# Patient Record
Sex: FEMALE | Race: WHITE | Employment: FULL TIME | URBAN - METROPOLITAN AREA
[De-identification: names, ages, dates, MRNs, and addresses within clinical notes are randomized per-mention and may not be internally consistent; named-entity substitution may affect disease eponyms.]

---

## 2017-01-04 ENCOUNTER — OFFICE VISIT (OUTPATIENT)
Dept: FAMILY MEDICINE CLINIC | Facility: CLINIC | Age: 42
End: 2017-01-04

## 2017-01-04 VITALS
SYSTOLIC BLOOD PRESSURE: 108 MMHG | DIASTOLIC BLOOD PRESSURE: 72 MMHG | TEMPERATURE: 99 F | OXYGEN SATURATION: 98 % | BODY MASS INDEX: 27.49 KG/M2 | WEIGHT: 161 LBS | HEIGHT: 64 IN | RESPIRATION RATE: 18 BRPM | HEART RATE: 96 BPM

## 2017-01-04 DIAGNOSIS — J40 BRONCHITIS: ICD-10-CM

## 2017-01-04 DIAGNOSIS — J06.9 UPPER RESPIRATORY TRACT INFECTION, UNSPECIFIED TYPE: Primary | ICD-10-CM

## 2017-01-04 PROCEDURE — 99213 OFFICE O/P EST LOW 20 MIN: CPT | Performed by: PHYSICIAN ASSISTANT

## 2017-01-04 RX ORDER — PREDNISONE 10 MG/1
TABLET ORAL
Qty: 24 TABLET | Refills: 0 | Status: SHIPPED | OUTPATIENT
Start: 2017-01-04 | End: 2017-03-02 | Stop reason: ALTCHOICE

## 2017-01-04 RX ORDER — DOXYCYCLINE HYCLATE 100 MG
100 TABLET ORAL 2 TIMES DAILY
Qty: 20 TABLET | Refills: 0 | Status: SHIPPED | OUTPATIENT
Start: 2017-01-04 | End: 2017-01-14

## 2017-01-05 NOTE — PROGRESS NOTES
CHIEF COMPLAINT:   Patient presents with:  Sinus Problem: x 4 days      HPI:   Alie Pepper is a 42year old female who presents for URI sxs for  4 days. Pt reports she was seen at the beginning of December.   Was given bactrim for sinus infection, which see Albuterol Sulfate HFA (VENTOLIN) 108 (90 BASE) MCG/ACT Inhalation Aero Soln Inhale 2 puffs into the lungs every 4 (four) hours as needed for Wheezing.  Disp: 6 Inhaler Rfl: 1   Fluticasone Furoate-Vilanterol (BREO ELLIPTA) 200-25 MCG/INH Inhalation Aerosol EARS: TM's not erythematous, no bulging, no retraction, minimal fluid, bony landmarks intact. EACs WNL BL. NOSE: Nostrils patent, clear nasal discharge, nasal mucosa inflamed. Nasal septum perforation.    THROAT: oral mucosa pink, moist. Posterior phar You have a viral upper respiratory illness (URI), which is another term for the common cold. When the infection causes a lot of irritation, the air passages can go into spasm. This causes wheezing and shortness of breath.     This illness is contagious duri Follow up with your healthcare provider, or as advised.   When to seek medical advice  Call your healthcare provider right away if any of these occur:  · Cough with lots of colored sputum (mucus)  · Severe headache; face, neck, or ear pain  · Difficulty swa

## 2017-01-05 NOTE — PATIENT INSTRUCTIONS
-Cool mist humidifier at night  -Warm tea with honey  -Sudafed  -Rescue inhaler as needed. Viral Upper Respiratory Illness with Wheezing (Adult)  You have a viral upper respiratory illness (URI), which is another term for the common cold.  When the inf · Over-the-counter cold medicines will not shorten the length of time you’re sick, but they may be helpful for the following symptoms: cough, sore throat, and nasal and sinus congestion.  (Note: Do not use decongestants if you have high blood pressure.)  Fo

## 2017-02-08 ENCOUNTER — TELEPHONE (OUTPATIENT)
Dept: FAMILY MEDICINE CLINIC | Facility: CLINIC | Age: 42
End: 2017-02-08

## 2017-02-08 NOTE — TELEPHONE ENCOUNTER
Called to pt to discuss [facility] paperwork. Per Dr Mason Galdamez pt will need appt. To go over and complete paperwork. Call reached unidentified vm. Left vmm for pt to call back and schedule. Paperwork at [employer].

## 2017-02-09 ENCOUNTER — TELEPHONE (OUTPATIENT)
Dept: FAMILY MEDICINE CLINIC | Facility: CLINIC | Age: 42
End: 2017-02-09

## 2017-02-09 DIAGNOSIS — J45.30 MILD PERSISTENT ASTHMA WITHOUT COMPLICATION: Primary | ICD-10-CM

## 2017-02-09 DIAGNOSIS — J32.0 CHRONIC MAXILLARY SINUSITIS: Primary | ICD-10-CM

## 2017-02-09 NOTE — TELEPHONE ENCOUNTER
Message received from [address] [place] [address] today: Please see message below and advise if okay to refer for 6-month follow-up visits. This is an external referral that has been authorized to this provider in the past (2/4/16 thru 1/31/17).  Provider

## 2017-02-09 NOTE — TELEPHONE ENCOUNTER
Message received from [facility] today: Please see message below and advise if okay to refer.  This is an internal referral.    Dx Code: J45.30 - Mild persistent asthma without complication     Reason for the order/referral:Yearly Follow U

## 2017-03-02 ENCOUNTER — OFFICE VISIT (OUTPATIENT)
Dept: FAMILY MEDICINE CLINIC | Facility: CLINIC | Age: 42
End: 2017-03-02

## 2017-03-02 VITALS
BODY MASS INDEX: 28.17 KG/M2 | SYSTOLIC BLOOD PRESSURE: 102 MMHG | RESPIRATION RATE: 16 BRPM | HEIGHT: 64 IN | DIASTOLIC BLOOD PRESSURE: 78 MMHG | WEIGHT: 165 LBS | TEMPERATURE: 98 F | HEART RATE: 80 BPM

## 2017-03-02 DIAGNOSIS — J01.11 ACUTE RECURRENT FRONTAL SINUSITIS: ICD-10-CM

## 2017-03-02 DIAGNOSIS — J45.40 MODERATE PERSISTENT EXTRINSIC ASTHMA WITHOUT COMPLICATION: Primary | ICD-10-CM

## 2017-03-02 PROCEDURE — 99214 OFFICE O/P EST MOD 30 MIN: CPT | Performed by: FAMILY MEDICINE

## 2017-03-02 RX ORDER — DOXYCYCLINE HYCLATE 100 MG
100 TABLET ORAL 2 TIMES DAILY
Qty: 14 TABLET | Refills: 0 | Status: SHIPPED | OUTPATIENT
Start: 2017-03-02 | End: 2017-03-09

## 2017-03-02 NOTE — PROGRESS NOTES
HPI:   Soni Whitaker is a 42year old female who presents for upper respiratory symptoms for 2 weeks.  Patient reports sore throat, congestion, yellow colored nasal discharge, sinus pain, OTC cold meds have not been helping, prior history of sinusitis, denies needed for Wheezing. Disp: 6 Inhaler Rfl: 1   Albuterol Sulfate (VENTOLIN) (2.5 MG/3ML) 0.083% Inhalation Nebu Soln Take  by nebulization every 6 (six) hours as needed for Wheezing. Disp:  Rfl:    Levonorgestrel (MIRENA IU) by Intrauterine route.  Disp:  Rf 100mg BID x 7 days. Saline Rinse. Tylenol or motrin as needed. Antihistamine prn. Fluids. Probiotics advised. Take abx with food. Side effects discussed. The patient indicates understanding of these issues and agrees to the plan.   The patient is as

## 2017-03-06 ENCOUNTER — TELEPHONE (OUTPATIENT)
Dept: FAMILY MEDICINE CLINIC | Facility: CLINIC | Age: 42
End: 2017-03-06

## 2017-03-06 RX ORDER — CLARITHROMYCIN 500 MG/1
500 TABLET, COATED ORAL 2 TIMES DAILY
Qty: 20 TABLET | Refills: 0 | Status: SHIPPED | OUTPATIENT
Start: 2017-03-06 | End: 2017-03-16

## 2017-03-06 NOTE — TELEPHONE ENCOUNTER
Pt called. She has been on Doxycycline 100 mg bid since Thursday. On Saturday morning she woke up with feeling nauseated, a headache and white spots on her gums. She stopped the medication Sunday morning thinking it was all a reaction from the doxycycline.

## 2017-03-06 NOTE — TELEPHONE ENCOUNTER
Lm for pt to CB. Please check your pharmacy if you are unavailable to resah us before we close. There is a new prescription waiting there for you.  ( see note below.)

## 2017-03-07 ENCOUNTER — TELEPHONE (OUTPATIENT)
Dept: INTERNAL MEDICINE CLINIC | Facility: CLINIC | Age: 42
End: 2017-03-07

## 2017-03-15 RX ORDER — PHENTERMINE HYDROCHLORIDE 37.5 MG/1
TABLET ORAL
Qty: 30 TABLET | Refills: 0 | Status: SHIPPED | OUTPATIENT
Start: 2017-03-15 | End: 2017-04-27 | Stop reason: DRUGHIGH

## 2017-03-15 RX ORDER — LEVOTHYROXINE SODIUM 137 UG/1
TABLET ORAL
Qty: 30 TABLET | Refills: 0 | Status: SHIPPED | OUTPATIENT
Start: 2017-03-15 | End: 2017-06-01

## 2017-04-21 RX ORDER — BUPROPION HYDROCHLORIDE 100 MG/1
TABLET ORAL
Qty: 120 TABLET | Refills: 0 | OUTPATIENT
Start: 2017-04-21

## 2017-04-21 RX ORDER — LEVOTHYROXINE SODIUM 137 UG/1
TABLET ORAL
Qty: 30 TABLET | Refills: 0 | Status: SHIPPED | OUTPATIENT
Start: 2017-04-21 | End: 2017-08-10 | Stop reason: DRUGHIGH

## 2017-04-21 RX ORDER — PHENTERMINE HYDROCHLORIDE 37.5 MG/1
TABLET ORAL
Qty: 30 TABLET | Refills: 0 | OUTPATIENT
Start: 2017-04-21

## 2017-04-21 NOTE — TELEPHONE ENCOUNTER
Requesting Phentermine and Bupropion  LOV: 12/13/16  RTC: 3 months  Last Labs: n/a  Filled: 12/13/16 #30 with 2 refills  Phentermine  Filled: 12/13/16 #120 with 5 refills Bupropion    No future appointments.     Bupropion was refilled until June and receipt

## 2017-04-27 ENCOUNTER — OFFICE VISIT (OUTPATIENT)
Dept: FAMILY MEDICINE CLINIC | Facility: CLINIC | Age: 42
End: 2017-04-27

## 2017-04-27 VITALS
SYSTOLIC BLOOD PRESSURE: 110 MMHG | RESPIRATION RATE: 16 BRPM | DIASTOLIC BLOOD PRESSURE: 78 MMHG | WEIGHT: 158 LBS | TEMPERATURE: 98 F | BODY MASS INDEX: 27 KG/M2 | OXYGEN SATURATION: 98 % | HEART RATE: 88 BPM

## 2017-04-27 DIAGNOSIS — J02.9 SORE THROAT: ICD-10-CM

## 2017-04-27 DIAGNOSIS — J01.00 ACUTE MAXILLARY SINUSITIS, RECURRENCE NOT SPECIFIED: Primary | ICD-10-CM

## 2017-04-27 PROCEDURE — 87880 STREP A ASSAY W/OPTIC: CPT | Performed by: PHYSICIAN ASSISTANT

## 2017-04-27 PROCEDURE — 99213 OFFICE O/P EST LOW 20 MIN: CPT | Performed by: PHYSICIAN ASSISTANT

## 2017-04-27 RX ORDER — SULFAMETHOXAZOLE AND TRIMETHOPRIM 800; 160 MG/1; MG/1
1 TABLET ORAL 2 TIMES DAILY
Qty: 20 TABLET | Refills: 0 | Status: SHIPPED | OUTPATIENT
Start: 2017-04-27 | End: 2017-05-07

## 2017-04-27 NOTE — PROGRESS NOTES
CHIEF COMPLAINT:   Patient presents with:  Nasal Congestion      HPI:   Vasile Matthews is a 42year old female who presents for URI sxs for  1.5 weeks.   Patient reports chills, nasal congestion with yellow drainage, sore throat, left ear pain, HAs, wheezing ye Albuterol Sulfate HFA (VENTOLIN) 108 (90 BASE) MCG/ACT Inhalation Aero Soln Inhale 2 puffs into the lungs every 4 (four) hours as needed for Wheezing.  Disp: 6 Inhaler Rfl: 1   Albuterol Sulfate (VENTOLIN) (2.5 MG/3ML) 0.083% Inhalation Nebu Soln Take  by n LUNGS: clear to auscultation bilaterally, no wheezes or rhonchi. Breathing is non labored. CARDIO: RRR without murmur  GI: active BS's x4,no masses, hepatosplenomegaly, or tenderness on direct palpation.     EXTREMITIES: no cyanosis, clubbing or edema  LYM ABRS most often follows an upper respiratory infection caused by a virus. Bacteria then infect the lining of your nasal cavity and sinuses.  But you can also get ABRS if you have:  · Nasal allergies  · Long-term nasal swelling and congestion not caused by a These problems may need to be treated in a hospital with intravenous (IV) antibiotic medicine or surgery.   When to call the health care provider  Call your health care provider if you have any of the following:  · Symptoms that don’t get better, or get wor

## 2017-04-27 NOTE — PATIENT INSTRUCTIONS
-Cool mist humidifier at night  -Warm tea with honey  -Motrin for pain or fever  -Follow up with PCP with any worsening symptoms            Acute Bacterial Rhinosinusitis (ABRS)  Acute bacterial rhinosinusitis (ABRS) is an infection of your nasal cavity an · Nasal corticosteroid medicine. Drops or spray used in the nose can lessen swelling and congestion. · Over-the-counter pain medicine. This is to lessen sinus pain and pressure. · Nasal decongestant medicine. Spray or drops may help to lessen congestion.

## 2017-06-01 ENCOUNTER — OFFICE VISIT (OUTPATIENT)
Dept: INTERNAL MEDICINE CLINIC | Facility: CLINIC | Age: 42
End: 2017-06-01

## 2017-06-01 VITALS
BODY MASS INDEX: 28 KG/M2 | WEIGHT: 164 LBS | SYSTOLIC BLOOD PRESSURE: 122 MMHG | HEIGHT: 64 IN | HEART RATE: 74 BPM | RESPIRATION RATE: 16 BRPM | DIASTOLIC BLOOD PRESSURE: 78 MMHG

## 2017-06-01 DIAGNOSIS — E53.8 LOW VITAMIN B12 LEVEL: ICD-10-CM

## 2017-06-01 DIAGNOSIS — Z51.81 ENCOUNTER FOR THERAPEUTIC DRUG MONITORING: Primary | ICD-10-CM

## 2017-06-01 DIAGNOSIS — E66.3 OVERWEIGHT (BMI 25.0-29.9): ICD-10-CM

## 2017-06-01 PROCEDURE — 96372 THER/PROPH/DIAG INJ SC/IM: CPT | Performed by: INTERNAL MEDICINE

## 2017-06-01 PROCEDURE — 99213 OFFICE O/P EST LOW 20 MIN: CPT | Performed by: INTERNAL MEDICINE

## 2017-06-01 RX ORDER — CYANOCOBALAMIN 1000 UG/ML
1000 INJECTION INTRAMUSCULAR; SUBCUTANEOUS ONCE
Status: COMPLETED | OUTPATIENT
Start: 2017-06-01 | End: 2017-06-01

## 2017-06-01 RX ORDER — BUPROPION HYDROCHLORIDE 100 MG/1
200 TABLET ORAL 2 TIMES DAILY
Qty: 120 TABLET | Refills: 5 | Status: SHIPPED | OUTPATIENT
Start: 2017-06-01 | End: 2017-06-30

## 2017-06-01 RX ORDER — PHENTERMINE HYDROCHLORIDE 37.5 MG/1
37.5 TABLET ORAL
Qty: 30 TABLET | Refills: 0 | Status: SHIPPED | OUTPATIENT
Start: 2017-06-01 | End: 2017-06-30

## 2017-06-01 RX ORDER — TOPIRAMATE 50 MG/1
TABLET, FILM COATED ORAL
Qty: 60 TABLET | Refills: 5 | Status: SHIPPED | OUTPATIENT
Start: 2017-06-01 | End: 2017-06-30

## 2017-06-01 RX ADMIN — CYANOCOBALAMIN 1000 MCG: 1000 INJECTION INTRAMUSCULAR; SUBCUTANEOUS at 14:16:00

## 2017-06-01 NOTE — PROGRESS NOTES
CC: Patient presents with:  Weight Check: up 3 lb       HPI:   Overweight, run out of meds, was on phentermine, topamax and wellbutrin, worsening hunger. No chest pain.        Current Outpatient Prescriptions:  topiramate 50 MG Oral Tab TAKE 1 TABLET( Nontoxic uninodular goiter     Allergic rhinitis, cause unspecified     Herpes simplex without mention of complication     Abnormal weight gain     Unspecified hypothyroidism     Unspecified asthma(493.90)     Sinusitis     Overweight     Acquired hypothyr issues and agrees to the plan. Return in about 4 weeks (around 6/29/2017).

## 2017-06-02 RX ORDER — LEVOTHYROXINE SODIUM 137 UG/1
TABLET ORAL
Qty: 30 TABLET | Refills: 0 | Status: SHIPPED | OUTPATIENT
Start: 2017-06-02 | End: 2017-06-22

## 2017-06-22 ENCOUNTER — OFFICE VISIT (OUTPATIENT)
Dept: FAMILY MEDICINE CLINIC | Facility: CLINIC | Age: 42
End: 2017-06-22

## 2017-06-22 VITALS
HEIGHT: 64 IN | TEMPERATURE: 99 F | RESPIRATION RATE: 16 BRPM | DIASTOLIC BLOOD PRESSURE: 64 MMHG | BODY MASS INDEX: 26.29 KG/M2 | SYSTOLIC BLOOD PRESSURE: 92 MMHG | WEIGHT: 154 LBS | HEART RATE: 94 BPM | OXYGEN SATURATION: 98 %

## 2017-06-22 DIAGNOSIS — S01.511A TEAR OF FRENULUM OF UPPER LIP, INITIAL ENCOUNTER: ICD-10-CM

## 2017-06-22 DIAGNOSIS — Z23 NEED FOR VACCINATION: ICD-10-CM

## 2017-06-22 DIAGNOSIS — K05.00 ACUTE GINGIVAL INFLAMMATION: Primary | ICD-10-CM

## 2017-06-22 DIAGNOSIS — K05.00 GINGIVITIS, ACUTE: ICD-10-CM

## 2017-06-22 PROCEDURE — 90471 IMMUNIZATION ADMIN: CPT | Performed by: FAMILY MEDICINE

## 2017-06-22 PROCEDURE — 99214 OFFICE O/P EST MOD 30 MIN: CPT | Performed by: FAMILY MEDICINE

## 2017-06-22 PROCEDURE — 90715 TDAP VACCINE 7 YRS/> IM: CPT | Performed by: FAMILY MEDICINE

## 2017-06-22 RX ORDER — PREDNISONE 20 MG/1
TABLET ORAL
Refills: 2 | COMMUNITY
Start: 2017-05-31 | End: 2017-06-30

## 2017-06-22 RX ORDER — DOXYCYCLINE 100 MG/1
100 CAPSULE ORAL EVERY 12 HOURS
Qty: 14 CAPSULE | Refills: 0 | Status: SHIPPED | OUTPATIENT
Start: 2017-06-22 | End: 2017-06-29

## 2017-06-22 RX ORDER — CYANOCOBALAMIN 1000 UG/ML
INJECTION INTRAMUSCULAR; SUBCUTANEOUS
COMMUNITY
End: 2018-04-10

## 2017-06-22 NOTE — PATIENT INSTRUCTIONS
Stages of Periodontal Disease  Periodontal disease is an infection of the gums and tissues supporting the teeth. If not treated, it often gets worse. Bone damage and tooth loss can occur.  Regular self-care and dental visits can help prevent or control pe

## 2017-06-22 NOTE — PROGRESS NOTES
[facility] Family Medicine Office Note  Chief Complaint:   Patient presents with:  Dental Problem (dental): infection gums upper left side, x 4 days, painful, swelling       HPI:   This is a 42year old female coming in for infection on upper tyler Injection Solution Inject into the muscle every 30 (thirty) days. Disp:  Rfl:    Doxycycline Monohydrate 100 MG Oral Cap Take 1 capsule (100 mg total) by mouth every 12 (twelve) hours.  Disp: 14 capsule Rfl: 0   topiramate 50 MG Oral Tab TAKE 1 TABLET(50 MG dyspnea on exertion or at rest.  RESPIRATORY:  Denies shortness of breath, wheezing, cough or sputum. GASTROINTESTINAL:  Denies abdominal pain, nausea, vomiting, constipation, diarrhea, or blood in stool.   MUSCULOSKELETAL:  Denies weakness, muscle aches, sounds normal in all 4 quadrants, no masses, no hepatosplenomegaly. BACK: No tenderness, no spasm. EXTREMITIES:  No edema, no cyanosis, no clubbing, FROM, 2+ dorsalis pedis pulses bilaterally.   NEURO:  No deficit, normal gait, strength and tone, sensory

## 2017-06-30 ENCOUNTER — OFFICE VISIT (OUTPATIENT)
Dept: INTERNAL MEDICINE CLINIC | Facility: CLINIC | Age: 42
End: 2017-06-30

## 2017-06-30 VITALS
HEART RATE: 74 BPM | BODY MASS INDEX: 27.49 KG/M2 | DIASTOLIC BLOOD PRESSURE: 78 MMHG | HEIGHT: 64 IN | SYSTOLIC BLOOD PRESSURE: 122 MMHG | RESPIRATION RATE: 16 BRPM | WEIGHT: 161 LBS

## 2017-06-30 DIAGNOSIS — E53.8 LOW VITAMIN B12 LEVEL: ICD-10-CM

## 2017-06-30 DIAGNOSIS — Z51.81 ENCOUNTER FOR THERAPEUTIC DRUG MONITORING: Primary | ICD-10-CM

## 2017-06-30 DIAGNOSIS — E66.3 OVERWEIGHT (BMI 25.0-29.9): ICD-10-CM

## 2017-06-30 PROCEDURE — 96372 THER/PROPH/DIAG INJ SC/IM: CPT | Performed by: INTERNAL MEDICINE

## 2017-06-30 PROCEDURE — 99213 OFFICE O/P EST LOW 20 MIN: CPT | Performed by: INTERNAL MEDICINE

## 2017-06-30 RX ORDER — CYANOCOBALAMIN 1000 UG/ML
1000 INJECTION INTRAMUSCULAR; SUBCUTANEOUS ONCE
Status: COMPLETED | OUTPATIENT
Start: 2017-06-30 | End: 2017-06-30

## 2017-06-30 RX ORDER — BUPROPION HYDROCHLORIDE 100 MG/1
200 TABLET ORAL 2 TIMES DAILY
Qty: 120 TABLET | Refills: 5 | Status: SHIPPED | OUTPATIENT
Start: 2017-06-30 | End: 2018-04-10

## 2017-06-30 RX ORDER — TOPIRAMATE 50 MG/1
TABLET, FILM COATED ORAL
Qty: 60 TABLET | Refills: 5 | Status: SHIPPED | OUTPATIENT
Start: 2017-06-30 | End: 2018-04-10

## 2017-06-30 RX ORDER — PHENTERMINE HYDROCHLORIDE 37.5 MG/1
37.5 TABLET ORAL
Qty: 30 TABLET | Refills: 0 | Status: SHIPPED | OUTPATIENT
Start: 2017-06-30 | End: 2017-08-03

## 2017-06-30 RX ORDER — METFORMIN HYDROCHLORIDE 750 MG/1
750 TABLET, EXTENDED RELEASE ORAL DAILY
Qty: 30 TABLET | Refills: 0 | Status: SHIPPED | OUTPATIENT
Start: 2017-06-30 | End: 2017-08-03

## 2017-06-30 RX ADMIN — CYANOCOBALAMIN 1000 MCG: 1000 INJECTION INTRAMUSCULAR; SUBCUTANEOUS at 16:22:00

## 2017-06-30 NOTE — PROGRESS NOTES
CC: Patient presents with:  Weight Check: down 3 lb       HPI:   Overweight, doing well on phentermine, topamax and wellbutrin, worsening hunger, mostly at beginning of day.        Current Outpatient Prescriptions:  BuPROPion HCl (WELLBUTRIN) 100 MG O Nontoxic uninodular goiter     Allergic rhinitis, cause unspecified     Herpes simplex without mention of complication     Abnormal weight gain     Unspecified hypothyroidism     Unspecified asthma(493.90)     Sinusitis     Overweight     Acquired hypoth cont monthly B12 shots     The patient indicates understanding of these issues and agrees to the plan. Return in about 4 weeks (around 7/28/2017).

## 2017-07-10 RX ORDER — LEVOTHYROXINE SODIUM 137 UG/1
137 TABLET ORAL
Qty: 30 TABLET | Refills: 0 | Status: SHIPPED | OUTPATIENT
Start: 2017-07-10 | End: 2017-08-03

## 2017-07-10 NOTE — TELEPHONE ENCOUNTER
Thyroid Supplements Protocol Failed    LEVOTHYROXINE 0.137MG (137MCG) TAB    Her labs TSH was done on 10/26/16 with a level of 0.060     Rx is pending for your approval.    Please sign,    Thank you

## 2017-07-14 ENCOUNTER — TELEPHONE (OUTPATIENT)
Dept: INTERNAL MEDICINE CLINIC | Facility: CLINIC | Age: 42
End: 2017-07-14

## 2017-08-01 ENCOUNTER — PATIENT MESSAGE (OUTPATIENT)
Dept: FAMILY MEDICINE CLINIC | Facility: CLINIC | Age: 42
End: 2017-08-01

## 2017-08-02 NOTE — TELEPHONE ENCOUNTER
YOHAN  I was unable to reach patient by phone. Information given to [facility].    Please advise, thank you

## 2017-08-03 ENCOUNTER — OFFICE VISIT (OUTPATIENT)
Dept: INTERNAL MEDICINE CLINIC | Facility: CLINIC | Age: 42
End: 2017-08-03

## 2017-08-03 VITALS
WEIGHT: 160 LBS | RESPIRATION RATE: 16 BRPM | DIASTOLIC BLOOD PRESSURE: 78 MMHG | BODY MASS INDEX: 27.31 KG/M2 | HEART RATE: 78 BPM | SYSTOLIC BLOOD PRESSURE: 112 MMHG | HEIGHT: 64 IN

## 2017-08-03 DIAGNOSIS — Z51.81 ENCOUNTER FOR THERAPEUTIC DRUG MONITORING: Primary | ICD-10-CM

## 2017-08-03 DIAGNOSIS — E66.3 OVERWEIGHT (BMI 25.0-29.9): ICD-10-CM

## 2017-08-03 DIAGNOSIS — E53.8 LOW VITAMIN B12 LEVEL: ICD-10-CM

## 2017-08-03 PROCEDURE — 96372 THER/PROPH/DIAG INJ SC/IM: CPT | Performed by: INTERNAL MEDICINE

## 2017-08-03 PROCEDURE — 99213 OFFICE O/P EST LOW 20 MIN: CPT | Performed by: INTERNAL MEDICINE

## 2017-08-03 RX ORDER — PHENTERMINE HYDROCHLORIDE 37.5 MG/1
37.5 TABLET ORAL
Qty: 30 TABLET | Refills: 0 | Status: SHIPPED | OUTPATIENT
Start: 2017-08-03 | End: 2017-09-15 | Stop reason: ALTCHOICE

## 2017-08-03 RX ORDER — METFORMIN HYDROCHLORIDE 750 MG/1
1500 TABLET, EXTENDED RELEASE ORAL DAILY
Qty: 60 TABLET | Refills: 5 | Status: SHIPPED | OUTPATIENT
Start: 2017-08-03 | End: 2018-04-10

## 2017-08-03 RX ORDER — CYANOCOBALAMIN 1000 UG/ML
1000 INJECTION INTRAMUSCULAR; SUBCUTANEOUS ONCE
Status: COMPLETED | OUTPATIENT
Start: 2017-08-03 | End: 2017-08-03

## 2017-08-03 RX ADMIN — CYANOCOBALAMIN 1000 MCG: 1000 INJECTION INTRAMUSCULAR; SUBCUTANEOUS at 12:24:00

## 2017-08-03 NOTE — PROGRESS NOTES
CC: Patient presents with:  Weight Check: down 1 lb       HPI:   Overweight, doing well on phentermine, topamax, metformin and wellbutrin, worsening hunger still.         Current Outpatient Prescriptions:  Phentermine HCl 37.5 MG Oral Tab Take 1 tablet Nontoxic uninodular goiter     Allergic rhinitis, cause unspecified     Herpes simplex without mention of complication     Abnormal weight gain     Unspecified hypothyroidism     Unspecified asthma(493.90)     Sinusitis     Overweight     Acquired hypothyr

## 2017-08-09 ENCOUNTER — TELEPHONE (OUTPATIENT)
Dept: FAMILY MEDICINE CLINIC | Facility: CLINIC | Age: 42
End: 2017-08-09

## 2017-08-09 ENCOUNTER — APPOINTMENT (OUTPATIENT)
Dept: LAB | Age: 42
End: 2017-08-09
Attending: FAMILY MEDICINE
Payer: COMMERCIAL

## 2017-08-09 DIAGNOSIS — E03.9 ACQUIRED HYPOTHYROIDISM: ICD-10-CM

## 2017-08-09 DIAGNOSIS — E03.9 ACQUIRED HYPOTHYROIDISM: Primary | ICD-10-CM

## 2017-08-09 LAB
FREE T4: 1.1 NG/DL (ref 0.9–1.8)
TSI SER-ACNC: 0.33 MIU/ML (ref 0.35–5.5)

## 2017-08-09 PROCEDURE — 36415 COLL VENOUS BLD VENIPUNCTURE: CPT

## 2017-08-09 PROCEDURE — 84439 ASSAY OF FREE THYROXINE: CPT

## 2017-08-09 PROCEDURE — 84443 ASSAY THYROID STIM HORMONE: CPT

## 2017-08-09 NOTE — TELEPHONE ENCOUNTER
Pt transferred to nurse. States she is at the lab for labs for what she thought were supposed to be for thyroid and there are not any labs in. She states she called her before going and was told she has labs in place.  I don't see any notes regarding this

## 2017-08-10 ENCOUNTER — TELEPHONE (OUTPATIENT)
Dept: FAMILY MEDICINE CLINIC | Facility: CLINIC | Age: 42
End: 2017-08-10

## 2017-08-10 DIAGNOSIS — E03.9 HYPOTHYROIDISM, UNSPECIFIED TYPE: Primary | ICD-10-CM

## 2017-08-10 RX ORDER — LEVOTHYROXINE SODIUM 0.12 MG/1
125 TABLET ORAL
Qty: 30 TABLET | Refills: 1 | Status: SHIPPED | OUTPATIENT
Start: 2017-08-10 | End: 2017-12-12

## 2017-08-10 RX ORDER — MONTELUKAST SODIUM 10 MG/1
TABLET ORAL
Qty: 90 TABLET | Refills: 0 | Status: SHIPPED | OUTPATIENT
Start: 2017-08-10 | End: 2018-01-05

## 2017-09-05 ENCOUNTER — OFFICE VISIT (OUTPATIENT)
Dept: INTERNAL MEDICINE CLINIC | Facility: CLINIC | Age: 42
End: 2017-09-05

## 2017-09-05 VITALS
SYSTOLIC BLOOD PRESSURE: 118 MMHG | HEIGHT: 64 IN | RESPIRATION RATE: 16 BRPM | DIASTOLIC BLOOD PRESSURE: 76 MMHG | BODY MASS INDEX: 28.34 KG/M2 | HEART RATE: 82 BPM | WEIGHT: 166 LBS

## 2017-09-05 DIAGNOSIS — Z51.81 ENCOUNTER FOR THERAPEUTIC DRUG MONITORING: Primary | ICD-10-CM

## 2017-09-05 DIAGNOSIS — E66.3 OVERWEIGHT (BMI 25.0-29.9): ICD-10-CM

## 2017-09-05 DIAGNOSIS — E53.8 LOW VITAMIN B12 LEVEL: ICD-10-CM

## 2017-09-05 PROCEDURE — 96372 THER/PROPH/DIAG INJ SC/IM: CPT | Performed by: INTERNAL MEDICINE

## 2017-09-05 PROCEDURE — 99213 OFFICE O/P EST LOW 20 MIN: CPT | Performed by: INTERNAL MEDICINE

## 2017-09-05 RX ORDER — PHENTERMINE HYDROCHLORIDE 37.5 MG/1
37.5 TABLET ORAL
Qty: 30 TABLET | Refills: 0 | Status: CANCELLED | OUTPATIENT
Start: 2017-09-05

## 2017-09-05 RX ORDER — PHENDIMETRAZINE TARTRATE 105 MG/1
1 CAPSULE, EXTENDED RELEASE ORAL DAILY
Qty: 30 CAPSULE | Refills: 0 | Status: SHIPPED | OUTPATIENT
Start: 2017-09-05 | End: 2017-10-05

## 2017-09-05 RX ORDER — CYANOCOBALAMIN 1000 UG/ML
1000 INJECTION INTRAMUSCULAR; SUBCUTANEOUS ONCE
Status: COMPLETED | OUTPATIENT
Start: 2017-09-05 | End: 2017-09-05

## 2017-09-05 RX ADMIN — CYANOCOBALAMIN 1000 MCG: 1000 INJECTION INTRAMUSCULAR; SUBCUTANEOUS at 16:03:00

## 2017-09-05 NOTE — PROGRESS NOTES
CC: Patient presents with:  Weight Check: up 6 lbs       HPI:   Overweight, doing well on phentermine, topamax, metformin and wellbutrin, worsening hunger still. She is not sure if phentermine helping much.        Current Outpatient Prescriptions:  Phe • Chronic rhinitis    • Extrinsic asthma, unspecified    • Hypothyroid    • Hypothyroidism    • Nasal polyps       Patient Active Problem List:     Nontoxic multinodular goiter     Nontoxic uninodular goiter     Allergic rhinitis, cause unspecified     H

## 2017-09-07 RX ORDER — ERGOCALCIFEROL 1.25 MG/1
CAPSULE ORAL
Qty: 4 CAPSULE | Refills: 0 | OUTPATIENT
Start: 2017-09-07

## 2017-09-15 ENCOUNTER — OFFICE VISIT (OUTPATIENT)
Dept: FAMILY MEDICINE CLINIC | Facility: CLINIC | Age: 42
End: 2017-09-15

## 2017-09-15 VITALS
SYSTOLIC BLOOD PRESSURE: 110 MMHG | OXYGEN SATURATION: 98 % | BODY MASS INDEX: 28 KG/M2 | TEMPERATURE: 98 F | DIASTOLIC BLOOD PRESSURE: 76 MMHG | HEART RATE: 90 BPM | RESPIRATION RATE: 16 BRPM | WEIGHT: 163 LBS

## 2017-09-15 DIAGNOSIS — G96.01 CSF LEAK FROM NOSE: ICD-10-CM

## 2017-09-15 DIAGNOSIS — J01.11 ACUTE RECURRENT FRONTAL SINUSITIS: Primary | ICD-10-CM

## 2017-09-15 PROCEDURE — 99214 OFFICE O/P EST MOD 30 MIN: CPT | Performed by: FAMILY MEDICINE

## 2017-09-15 RX ORDER — DOXYCYCLINE HYCLATE 100 MG
100 TABLET ORAL 2 TIMES DAILY
Qty: 14 TABLET | Refills: 0 | Status: SHIPPED | OUTPATIENT
Start: 2017-09-15 | End: 2017-09-22

## 2017-09-18 ENCOUNTER — APPOINTMENT (OUTPATIENT)
Dept: LAB | Age: 42
End: 2017-09-18
Attending: FAMILY MEDICINE
Payer: COMMERCIAL

## 2017-09-18 DIAGNOSIS — G96.01 CSF LEAK FROM NOSE: ICD-10-CM

## 2017-09-18 PROCEDURE — 86335 IMMUNFIX E-PHORSIS/URINE/CSF: CPT

## 2017-09-20 LAB — BETA-2 TRANSFERRIN: NOT DETECTED

## 2017-10-27 ENCOUNTER — PATIENT MESSAGE (OUTPATIENT)
Dept: FAMILY MEDICINE CLINIC | Facility: CLINIC | Age: 42
End: 2017-10-27

## 2017-10-27 NOTE — TELEPHONE ENCOUNTER
Contact made with patient, information and recommendations given, understanding verbalized states she will go to immediate care today.

## 2017-10-27 NOTE — TELEPHONE ENCOUNTER
I do not typically give antibiotics site unseen. She either needs to come in tomorrow for an acute visit with the provider in the office or she can go to immediate care.

## 2017-10-27 NOTE — TELEPHONE ENCOUNTER
From: Tsering Agustin  To: Nelida Lewis DO  Sent: 10/27/2017 8:05 AM CDT  Subject: Prescription Question    Hi,    I have a question regarding a refill on an antibiotic.  I have frequent sinus infections and once again I have one that I have been holding

## 2017-10-30 ENCOUNTER — OFFICE VISIT (OUTPATIENT)
Dept: FAMILY MEDICINE CLINIC | Facility: CLINIC | Age: 42
End: 2017-10-30

## 2017-10-30 VITALS
WEIGHT: 169 LBS | TEMPERATURE: 98 F | SYSTOLIC BLOOD PRESSURE: 110 MMHG | HEART RATE: 96 BPM | OXYGEN SATURATION: 98 % | RESPIRATION RATE: 16 BRPM | DIASTOLIC BLOOD PRESSURE: 74 MMHG | HEIGHT: 64 IN | BODY MASS INDEX: 28.85 KG/M2

## 2017-10-30 DIAGNOSIS — J01.11 ACUTE RECURRENT FRONTAL SINUSITIS: Primary | ICD-10-CM

## 2017-10-30 DIAGNOSIS — J02.9 SORE THROAT: ICD-10-CM

## 2017-10-30 PROCEDURE — 99214 OFFICE O/P EST MOD 30 MIN: CPT | Performed by: FAMILY MEDICINE

## 2017-10-30 PROCEDURE — 87880 STREP A ASSAY W/OPTIC: CPT | Performed by: FAMILY MEDICINE

## 2017-10-30 RX ORDER — METHYLPREDNISOLONE 4 MG/1
TABLET ORAL
Qty: 1 KIT | Refills: 0 | Status: SHIPPED | OUTPATIENT
Start: 2017-10-30 | End: 2017-12-11 | Stop reason: ALTCHOICE

## 2017-10-30 RX ORDER — CEFDINIR 300 MG/1
300 CAPSULE ORAL 2 TIMES DAILY
Qty: 20 CAPSULE | Refills: 0 | Status: SHIPPED | OUTPATIENT
Start: 2017-10-30 | End: 2017-11-09

## 2017-10-30 NOTE — PROGRESS NOTES
HPI:   Chai Peguero is a 42year old female who presents for upper respiratory symptoms for  7  days. Patient reports sore throat, congestion, ear pain, sinus pain, OTC cold meds have not been helping, prior history of sinusitis, denies fever.       Current O NASAL POLYP DR Lakhwinder Garrison   Family History   Problem Relation Age of Onset   • Cancer Mother      Brain   • Cancer Maternal Grandmother      stomach cancer?    • Diabetes Maternal Grandmother    • Diabetes Maternal Grandfather    • Heart Disorder Maternal [place]

## 2017-12-11 ENCOUNTER — OFFICE VISIT (OUTPATIENT)
Dept: INTERNAL MEDICINE CLINIC | Facility: CLINIC | Age: 42
End: 2017-12-11

## 2017-12-11 ENCOUNTER — OFFICE VISIT (OUTPATIENT)
Dept: FAMILY MEDICINE CLINIC | Facility: CLINIC | Age: 42
End: 2017-12-11

## 2017-12-11 VITALS
DIASTOLIC BLOOD PRESSURE: 72 MMHG | RESPIRATION RATE: 16 BRPM | TEMPERATURE: 98 F | SYSTOLIC BLOOD PRESSURE: 104 MMHG | BODY MASS INDEX: 28.51 KG/M2 | HEIGHT: 64 IN | HEART RATE: 86 BPM | OXYGEN SATURATION: 98 % | WEIGHT: 167 LBS

## 2017-12-11 VITALS
BODY MASS INDEX: 30.05 KG/M2 | RESPIRATION RATE: 16 BRPM | HEART RATE: 80 BPM | HEIGHT: 64 IN | DIASTOLIC BLOOD PRESSURE: 76 MMHG | WEIGHT: 176 LBS | SYSTOLIC BLOOD PRESSURE: 122 MMHG

## 2017-12-11 DIAGNOSIS — E66.3 OVERWEIGHT (BMI 25.0-29.9): Primary | ICD-10-CM

## 2017-12-11 DIAGNOSIS — E53.8 LOW VITAMIN B12 LEVEL: ICD-10-CM

## 2017-12-11 DIAGNOSIS — J01.11 ACUTE RECURRENT FRONTAL SINUSITIS: ICD-10-CM

## 2017-12-11 DIAGNOSIS — Z51.81 ENCOUNTER FOR THERAPEUTIC DRUG MONITORING: ICD-10-CM

## 2017-12-11 DIAGNOSIS — M25.522 LEFT ELBOW PAIN: Primary | ICD-10-CM

## 2017-12-11 PROCEDURE — 99213 OFFICE O/P EST LOW 20 MIN: CPT | Performed by: NURSE PRACTITIONER

## 2017-12-11 PROCEDURE — 96372 THER/PROPH/DIAG INJ SC/IM: CPT | Performed by: INTERNAL MEDICINE

## 2017-12-11 PROCEDURE — 99214 OFFICE O/P EST MOD 30 MIN: CPT | Performed by: INTERNAL MEDICINE

## 2017-12-11 RX ORDER — SULFAMETHOXAZOLE AND TRIMETHOPRIM 800; 160 MG/1; MG/1
1 TABLET ORAL 2 TIMES DAILY
Qty: 20 TABLET | Refills: 0 | Status: SHIPPED | OUTPATIENT
Start: 2017-12-11 | End: 2017-12-11

## 2017-12-11 RX ORDER — CYANOCOBALAMIN 1000 UG/ML
1000 INJECTION INTRAMUSCULAR; SUBCUTANEOUS ONCE
Status: COMPLETED | OUTPATIENT
Start: 2017-12-11 | End: 2017-12-11

## 2017-12-11 RX ORDER — PHENTERMINE HYDROCHLORIDE 37.5 MG/1
37.5 TABLET ORAL
Qty: 30 TABLET | Refills: 0 | Status: SHIPPED | OUTPATIENT
Start: 2017-12-11 | End: 2017-12-11 | Stop reason: CLARIF

## 2017-12-11 RX ORDER — PHENTERMINE HYDROCHLORIDE 15 MG/1
15 CAPSULE ORAL EVERY MORNING
Qty: 30 CAPSULE | Refills: 0 | Status: SHIPPED | OUTPATIENT
Start: 2017-12-11 | End: 2018-02-22 | Stop reason: ALTCHOICE

## 2017-12-11 RX ADMIN — CYANOCOBALAMIN 1000 MCG: 1000 INJECTION INTRAMUSCULAR; SUBCUTANEOUS at 16:30:00

## 2017-12-11 NOTE — PROGRESS NOTES
Fidel Garcia is a 42year old female. HPI:   Patient presents today reporting left elbow pain x1 week. She reports that last week she fell while in [place] and landed on her elbow. She reports that she has taken OTC Tylenol for discomfort.  She reports she Inject into the muscle every 30 (thirty) days.  Disp:  Rfl:    IPRATROPIUM BROMIDE 0.02 % Inhalation Solution USE 2.5 ML VIA NEBULIZER TWICE DAILY Disp: 62.5 mL Rfl: 0   tobramycin-dexamethasone 0.3-0.1 % Ophthalmic Suspension  Disp:  Rfl: 7   Albuterol [facility] no rashes,no suspicious lesions  HEENT: TM clear bilaterally. Nasal turbinates are erythematous and edematous-clear nasal congestion noted. Throat is mildly erythematous-PND evident. No exudate. No mass.  No frontal or maxillary sinus tenderness with palpat needed for discomfort. Drink plenty of fluids-stay hydrated. Pt instructed to follow up with her ENT as this is 3rd sinus infection 3.5 months. The patient indicates understanding of these issues and agrees to the plan.  The patient is asked to retur

## 2017-12-11 NOTE — PROGRESS NOTES
Joel Grullon is a 42year old female.     Chief complaint:weight loss follow up     HPI:   RHONA here for follow up on weight loss     Wt Readings from Last 6 Encounters:  12/11/17 : 176 lb  12/11/17 : 167 lb  10/30/17 : 169 lb  09/15/17 : 163 lb  09/05/17 : 1 puffs into the lungs every 4 (four) hours as needed for Wheezing. Disp: 6 Inhaler Rfl: 1   Albuterol Sulfate (VENTOLIN) (2.5 MG/3ML) 0.083% Inhalation Nebu Soln Take  by nebulization every 6 (six) hours as needed for Wheezing.  Disp:  Rfl:    Levonorgestrel apparent distress  SKIN: no rashes,no suspicious lesions  HEENT: atraumatic, normocephalic,ears and throat are clear  NECK: supple,no adenopathy  LUNGS: clear to auscultation  CARDIO: RRR without murmur  GI: good BS's,no masses, HSM or tenderness  EXTREMIT off-label    Please return to the clinic if you are having persistent or worsening symptoms   Stanton Srinivasan MD,   Diplomate of the American Board of Internal Medicine  Diplomate of the American Board of Obesity Medicine

## 2017-12-12 ENCOUNTER — HOSPITAL ENCOUNTER (OUTPATIENT)
Dept: GENERAL RADIOLOGY | Age: 42
Discharge: HOME | End: 2017-12-12
Attending: NURSE PRACTITIONER
Payer: COMMERCIAL

## 2017-12-12 DIAGNOSIS — M25.522 LEFT ELBOW PAIN: ICD-10-CM

## 2017-12-12 DIAGNOSIS — E03.9 HYPOTHYROIDISM, UNSPECIFIED TYPE: ICD-10-CM

## 2017-12-12 PROCEDURE — 73080 X-RAY EXAM OF ELBOW: CPT | Performed by: NURSE PRACTITIONER

## 2017-12-13 RX ORDER — LEVOTHYROXINE SODIUM 0.12 MG/1
TABLET ORAL
Qty: 90 TABLET | Refills: 0 | Status: SHIPPED | OUTPATIENT
Start: 2017-12-13 | End: 2018-01-30 | Stop reason: DRUGHIGH

## 2017-12-13 NOTE — TELEPHONE ENCOUNTER
Please call pt.  recommended pt to recheck thyroid 6 weeks after 8/10/17. Lab order already pending. Thanks.

## 2018-01-08 RX ORDER — MONTELUKAST SODIUM 10 MG/1
TABLET ORAL
Qty: 90 TABLET | Refills: 0 | Status: SHIPPED | OUTPATIENT
Start: 2018-01-08 | End: 2018-03-15

## 2018-01-15 ENCOUNTER — OFFICE VISIT (OUTPATIENT)
Dept: INTERNAL MEDICINE CLINIC | Facility: CLINIC | Age: 43
End: 2018-01-15

## 2018-01-15 VITALS
HEART RATE: 76 BPM | RESPIRATION RATE: 16 BRPM | BODY MASS INDEX: 30.05 KG/M2 | SYSTOLIC BLOOD PRESSURE: 120 MMHG | DIASTOLIC BLOOD PRESSURE: 76 MMHG | HEIGHT: 64 IN | WEIGHT: 176 LBS

## 2018-01-15 DIAGNOSIS — E66.9 OBESITY, UNSPECIFIED CLASSIFICATION, UNSPECIFIED OBESITY TYPE, UNSPECIFIED WHETHER SERIOUS COMORBIDITY PRESENT: ICD-10-CM

## 2018-01-15 DIAGNOSIS — Z51.81 THERAPEUTIC DRUG MONITORING: Primary | ICD-10-CM

## 2018-01-15 PROCEDURE — 99213 OFFICE O/P EST LOW 20 MIN: CPT | Performed by: INTERNAL MEDICINE

## 2018-01-15 RX ORDER — PHENTERMINE HYDROCHLORIDE 37.5 MG/1
37.5 TABLET ORAL
Qty: 30 TABLET | Refills: 0 | Status: SHIPPED | OUTPATIENT
Start: 2018-01-15 | End: 2018-02-12

## 2018-01-15 NOTE — PROGRESS NOTES
Vasile Matthews is a 36year old female.     Chief complaint:weight loss follow up     HPI:   RHONA here for follow up on weight loss   Wt Readings from Last 6 Encounters:  01/15/18 : 176 lb  12/11/17 : 176 lb  12/11/17 : 167 lb  10/30/17 : 169 lb  09/15/17 : 163 Inhale 1 puff into the lungs daily.  Disp: 3 each Rfl: 3   tobramycin-dexamethasone 0.3-0.1 % Ophthalmic Suspension  Disp:  Rfl: 7   Albuterol Sulfate HFA (VENTOLIN) 108 (90 BASE) MCG/ACT Inhalation Aero Soln Inhale 2 puffs into the lungs every 4 (four) preet negatives noted in the the HPI          PHYSICAL EXAM:   /76   Pulse 76   Resp 16   Ht 64\"   Wt 176 lb   BMI 30.21 kg/m²   GENERAL: well developed, well nourished,in no apparent distress  SKIN: no rashes,no suspicious lesions  HEENT: atraumatic, nor

## 2018-01-25 ENCOUNTER — OFFICE VISIT (OUTPATIENT)
Dept: FAMILY MEDICINE CLINIC | Facility: CLINIC | Age: 43
End: 2018-01-25

## 2018-01-25 VITALS
TEMPERATURE: 98 F | HEART RATE: 88 BPM | RESPIRATION RATE: 16 BRPM | SYSTOLIC BLOOD PRESSURE: 120 MMHG | DIASTOLIC BLOOD PRESSURE: 70 MMHG | WEIGHT: 158 LBS | BODY MASS INDEX: 26.98 KG/M2 | HEIGHT: 64 IN

## 2018-01-25 DIAGNOSIS — J45.41 MODERATE PERSISTENT ASTHMA WITH ACUTE EXACERBATION: Primary | ICD-10-CM

## 2018-01-25 PROCEDURE — 99213 OFFICE O/P EST LOW 20 MIN: CPT | Performed by: FAMILY MEDICINE

## 2018-01-25 NOTE — PROGRESS NOTES
[facility] Family Medicine Office Note  Chief Complaint:   Patient presents with: Worker's Comp: fill out [employer] paper work       HPI:   This is a 36year old female coming in for recheck of asthma sx's.  Lately the patient's asthma has been  under BREAKFAST Disp: 90 tablet Rfl: 0   Phentermine HCl 15 MG Oral Cap Take 1 capsule (15 mg total) by mouth every morning. Disp: 30 capsule Rfl: 0   MetFORMIN HCl  MG Oral Tablet 24 Hr Take 2 tablets (1,500 mg total) by mouth daily.  Disp: 60 tablet Rfl: dizziness, syncope  MUSCULOSKELETAL:  Denies muscle, back pain, joint pain or stiffness.   ALLERGIES:  + asthma and rhinitis    EXAM:   /70   Pulse 88   Temp 97.9 °F (36.6 °C) (Oral)   Resp 16   Ht 64\"   Wt 158 lb   LMP 01/13/2018 (Approximate)   BMI Patient is notified to call with any questions, complications, allergies, or worsening or changing symptoms. Patient is to call with any side effects or complications from the treatments as a result of today.      Problem List:  Patient Active Problem List

## 2018-01-27 ENCOUNTER — LAB ENCOUNTER (OUTPATIENT)
Dept: LAB | Facility: HOSPITAL | Age: 43
End: 2018-01-27
Attending: INTERNAL MEDICINE
Payer: COMMERCIAL

## 2018-01-27 DIAGNOSIS — E66.3 OVERWEIGHT (BMI 25.0-29.9): ICD-10-CM

## 2018-01-27 DIAGNOSIS — E03.9 HYPOTHYROIDISM, UNSPECIFIED TYPE: ICD-10-CM

## 2018-01-27 LAB
25-HYDROXYVITAMIN D (TOTAL): 26.9 NG/ML (ref 30–100)
ALBUMIN SERPL-MCNC: 3.9 G/DL (ref 3.5–4.8)
ALP LIVER SERPL-CCNC: 69 U/L (ref 37–98)
ALT SERPL-CCNC: 15 U/L (ref 14–54)
AST SERPL-CCNC: 7 U/L (ref 15–41)
BASOPHILS # BLD AUTO: 0.08 X10(3) UL (ref 0–0.1)
BASOPHILS NFR BLD AUTO: 1.3 %
BILIRUB SERPL-MCNC: 0.6 MG/DL (ref 0.1–2)
BUN BLD-MCNC: 9 MG/DL (ref 8–20)
CALCIUM BLD-MCNC: 8.3 MG/DL (ref 8.3–10.3)
CHLORIDE: 109 MMOL/L (ref 101–111)
CHOLEST SMN-MCNC: 145 MG/DL (ref ?–200)
CO2: 26 MMOL/L (ref 22–32)
CREAT BLD-MCNC: 0.95 MG/DL (ref 0.55–1.02)
EOSINOPHIL # BLD AUTO: 0.65 X10(3) UL (ref 0–0.3)
EOSINOPHIL NFR BLD AUTO: 10.2 %
ERYTHROCYTE [DISTWIDTH] IN BLOOD BY AUTOMATED COUNT: 12.9 % (ref 11.5–16)
EST. AVERAGE GLUCOSE BLD GHB EST-MCNC: 85 MG/DL (ref 68–126)
FREE T4: 1 NG/DL (ref 0.9–1.8)
GLUCOSE BLD-MCNC: 92 MG/DL (ref 70–99)
HAV AB SERPL IA-ACNC: 472 PG/ML (ref 193–986)
HBA1C MFR BLD HPLC: 4.6 % (ref ?–5.7)
HCT VFR BLD AUTO: 42 % (ref 34–50)
HDLC SERPL-MCNC: 53 MG/DL (ref 45–?)
HDLC SERPL: 2.74 {RATIO} (ref ?–4.44)
HGB BLD-MCNC: 14.2 G/DL (ref 12–16)
IMMATURE GRANULOCYTE COUNT: 0.03 X10(3) UL (ref 0–1)
IMMATURE GRANULOCYTE RATIO %: 0.5 %
LDLC SERPL CALC-MCNC: 82 MG/DL (ref ?–130)
LYMPHOCYTES # BLD AUTO: 1.73 X10(3) UL (ref 0.9–4)
LYMPHOCYTES NFR BLD AUTO: 27.2 %
M PROTEIN MFR SERPL ELPH: 7.6 G/DL (ref 6.1–8.3)
MCH RBC QN AUTO: 30.5 PG (ref 27–33.2)
MCHC RBC AUTO-ENTMCNC: 33.8 G/DL (ref 31–37)
MCV RBC AUTO: 90.1 FL (ref 81–100)
MONOCYTES # BLD AUTO: 0.51 X10(3) UL (ref 0.1–0.6)
MONOCYTES NFR BLD AUTO: 8 %
NEUTROPHIL ABS PRELIM: 3.37 X10 (3) UL (ref 1.3–6.7)
NEUTROPHILS # BLD AUTO: 3.37 X10(3) UL (ref 1.3–6.7)
NEUTROPHILS NFR BLD AUTO: 52.8 %
NONHDLC SERPL-MCNC: 92 MG/DL (ref ?–130)
PLATELET # BLD AUTO: 214 10(3)UL (ref 150–450)
POTASSIUM SERPL-SCNC: 3.7 MMOL/L (ref 3.6–5.1)
RBC # BLD AUTO: 4.66 X10(6)UL (ref 3.8–5.1)
RED CELL DISTRIBUTION WIDTH-SD: 42.5 FL (ref 35.1–46.3)
SODIUM SERPL-SCNC: 142 MMOL/L (ref 136–144)
TRIGL SERPL-MCNC: 50 MG/DL (ref ?–150)
TSI SER-ACNC: 9.24 MIU/ML (ref 0.35–5.5)
VLDLC SERPL CALC-MCNC: 10 MG/DL (ref 5–40)
WBC # BLD AUTO: 6.4 X10(3) UL (ref 4–13)

## 2018-01-27 PROCEDURE — 82306 VITAMIN D 25 HYDROXY: CPT

## 2018-01-27 PROCEDURE — 80053 COMPREHEN METABOLIC PANEL: CPT

## 2018-01-27 PROCEDURE — 84443 ASSAY THYROID STIM HORMONE: CPT

## 2018-01-27 PROCEDURE — 80061 LIPID PANEL: CPT

## 2018-01-27 PROCEDURE — 36415 COLL VENOUS BLD VENIPUNCTURE: CPT

## 2018-01-27 PROCEDURE — 83036 HEMOGLOBIN GLYCOSYLATED A1C: CPT

## 2018-01-27 PROCEDURE — 84439 ASSAY OF FREE THYROXINE: CPT

## 2018-01-27 PROCEDURE — 82607 VITAMIN B-12: CPT

## 2018-01-27 PROCEDURE — 85025 COMPLETE CBC W/AUTO DIFF WBC: CPT

## 2018-01-29 ENCOUNTER — TELEPHONE (OUTPATIENT)
Dept: INTERNAL MEDICINE CLINIC | Facility: CLINIC | Age: 43
End: 2018-01-29

## 2018-01-29 RX ORDER — ERGOCALCIFEROL 1.25 MG/1
50000 CAPSULE ORAL WEEKLY
Qty: 12 CAPSULE | Refills: 0 | Status: SHIPPED | OUTPATIENT
Start: 2018-01-29 | End: 2018-02-10

## 2018-01-30 ENCOUNTER — TELEPHONE (OUTPATIENT)
Dept: FAMILY MEDICINE CLINIC | Facility: CLINIC | Age: 43
End: 2018-01-30

## 2018-01-30 DIAGNOSIS — E03.9 HYPOTHYROIDISM, UNSPECIFIED TYPE: Primary | ICD-10-CM

## 2018-01-30 RX ORDER — LEVOTHYROXINE SODIUM 137 UG/1
137 TABLET ORAL
Qty: 30 TABLET | Refills: 1 | Status: SHIPPED | OUTPATIENT
Start: 2018-01-30 | End: 2018-03-16

## 2018-02-01 ENCOUNTER — TELEPHONE (OUTPATIENT)
Dept: FAMILY MEDICINE CLINIC | Facility: CLINIC | Age: 43
End: 2018-02-01

## 2018-02-01 NOTE — TELEPHONE ENCOUNTER
Pt called and left a [facility] w/Medical Records requesting for her [employer] paperwork be re-faxed. Per pt, nothing was received yet to the company. Per pt, fax number for where [employer] paperwork needs to go to is [phone]. Pt @ [phone]. Thank you.

## 2018-02-12 ENCOUNTER — OFFICE VISIT (OUTPATIENT)
Dept: INTERNAL MEDICINE CLINIC | Facility: CLINIC | Age: 43
End: 2018-02-12

## 2018-02-12 VITALS
HEART RATE: 80 BPM | SYSTOLIC BLOOD PRESSURE: 100 MMHG | WEIGHT: 176 LBS | RESPIRATION RATE: 16 BRPM | HEIGHT: 64 IN | BODY MASS INDEX: 30.05 KG/M2 | DIASTOLIC BLOOD PRESSURE: 60 MMHG

## 2018-02-12 DIAGNOSIS — E03.9 HYPOTHYROIDISM, UNSPECIFIED TYPE: Primary | ICD-10-CM

## 2018-02-12 DIAGNOSIS — F43.9 STRESS: ICD-10-CM

## 2018-02-12 DIAGNOSIS — E66.9 CLASS 1 OBESITY WITHOUT SERIOUS COMORBIDITY WITH BODY MASS INDEX (BMI) OF 30.0 TO 30.9 IN ADULT, UNSPECIFIED OBESITY TYPE: ICD-10-CM

## 2018-02-12 DIAGNOSIS — Z51.81 THERAPEUTIC DRUG MONITORING: ICD-10-CM

## 2018-02-12 PROCEDURE — 99213 OFFICE O/P EST LOW 20 MIN: CPT | Performed by: INTERNAL MEDICINE

## 2018-02-12 RX ORDER — LEVOMEFOLATE CALCIUM 15 MG
15 TABLET ORAL DAILY
Qty: 30 TABLET | Refills: 0 | Status: SHIPPED | OUTPATIENT
Start: 2018-02-12 | End: 2018-02-21

## 2018-02-12 RX ORDER — BUPROPION HYDROCHLORIDE 150 MG/1
150 TABLET, EXTENDED RELEASE ORAL 2 TIMES DAILY
Qty: 60 TABLET | Refills: 0 | Status: SHIPPED | OUTPATIENT
Start: 2018-02-12 | End: 2018-04-10

## 2018-02-12 RX ORDER — PHENTERMINE HYDROCHLORIDE 37.5 MG/1
37.5 TABLET ORAL
Qty: 30 TABLET | Refills: 0 | Status: SHIPPED | OUTPATIENT
Start: 2018-02-12 | End: 2018-03-14

## 2018-02-12 NOTE — PROGRESS NOTES
Edgar Elise is a 36year old female.     Chief complaint:weight loss follow up     HPI:   RHONA here for follow up on weight loss   Wt Readings from Last 6 Encounters:  02/12/18 : 176 lb  01/25/18 : 158 lb  01/15/18 : 176 lb  12/11/17 : 176 lb  12/11/17 : 1 Albuterol Sulfate HFA (VENTOLIN) 108 (90 BASE) MCG/ACT Inhalation Aero Soln Inhale 2 puffs into the lungs every 4 (four) hours as needed for Wheezing.  Disp: 6 Inhaler Rfl: 1   Albuterol Sulfate (VENTOLIN) (2.5 MG/3ML) 0.083% Inhalation Nebu Soln Take  by SYSTEMS:   A comprehensive 10 point review of systems was completed.   Pertinent positives and negatives noted in the the HPI          PHYSICAL EXAM:   /60   Pulse 80   Resp 16   Ht 64\"   Wt 176 lb   LMP 01/13/2018 (Approximate)   BMI 30.21 kg/m²   G time  6. Treatment plan   7.  Reviewed that use of phentermine/diethylpropion/phendimetrazine for more than 90 days is considered off-label    Please return to the clinic if you are having persistent or worsening symptoms   Pilar Pena MD,   Diplomate of

## 2018-02-12 NOTE — PATIENT INSTRUCTIONS
Why We Gain Weight When Franco Spare Stressed—And How Not To  The psychology and biology of stress-related overeating and weight gain   Emotional Eating under Stress   Have you ever found yourself mindlessly eating a tub of ice cream while you brood about your la glucose. Today’s human, who sits on the couch worrying about how to pay the bill or works long hours at the computer to make the deadline, does not work off much energy at all dealing with the stressor!  Unfortunately, we are stuck with a neuroendocrine sys reported dealing with stress in this way, while 42% reported watching television for more than 2 hours a day to deal with stress.  Being a couch potato also increases the temptation to overeat and is inactive, which means that those extra calories aren’t ge survey, more than 40% of us lie awake at night as a result of stress. Research shows that worry is a major cause of insomnia. Our minds are overactive and won’t switch off.  We may also lose sleep because of pulling overnights to cram for exams or writing u going to a yoga class, getting a massage, patting your dog, or making time for friends and family can help to relieve stress without adding on the pounds.  Although you may feel that you don’t have time for leisure activities with looming deadlines, taking

## 2018-02-21 ENCOUNTER — TELEPHONE (OUTPATIENT)
Dept: FAMILY MEDICINE CLINIC | Facility: CLINIC | Age: 43
End: 2018-02-21

## 2018-02-21 NOTE — TELEPHONE ENCOUNTER
Call to pt's cell reaches identified voice mail. Left vmm req call back to triage nurse today re appt scheduled for tomorrow.   (Obtain additional symptom info and determine if appt tomorrow is clinically appropriate)

## 2018-02-21 NOTE — TELEPHONE ENCOUNTER
My chart msg received:    2/22/2018    3:30 PM  15 mins. Sue Aguirre, DO   [facility] [address]      Patient Comments:   [facility] New Problem Visit   [place] like passing out last week an symptoms started    again today

## 2018-02-22 ENCOUNTER — OFFICE VISIT (OUTPATIENT)
Dept: FAMILY MEDICINE CLINIC | Facility: CLINIC | Age: 43
End: 2018-02-22

## 2018-02-22 VITALS
RESPIRATION RATE: 18 BRPM | HEART RATE: 60 BPM | SYSTOLIC BLOOD PRESSURE: 102 MMHG | HEIGHT: 64 IN | WEIGHT: 174 LBS | DIASTOLIC BLOOD PRESSURE: 72 MMHG | TEMPERATURE: 99 F | BODY MASS INDEX: 29.71 KG/M2

## 2018-02-22 DIAGNOSIS — R42 LIGHTHEADEDNESS: Primary | ICD-10-CM

## 2018-02-22 DIAGNOSIS — R11.0 NAUSEA: ICD-10-CM

## 2018-02-22 PROCEDURE — 99213 OFFICE O/P EST LOW 20 MIN: CPT | Performed by: FAMILY MEDICINE

## 2018-02-23 NOTE — PROGRESS NOTES
[facility] Family Medicine Office Note  Chief Complaint:   Patient presents with:  Dizziness: x 1 week ago, again x 2 days ago, vertigo, nausea   Bump: x 2 days bumps back of tongue, no pain, sinus pressure, nasal congetstion       HPI:   This is Grandfather    • Heart Disorder Maternal Grandfather    • Cancer Maternal Grandfather      unknown cancer     Allergies:    Aspirin                 Tightness in Throat  Levaquin [Levofloxa*    Rash  Nsaids                    Penicillins                 Com every 6 (six) hours as needed for Wheezing. Disp:  Rfl:    Levonorgestrel (MIRENA IU) by Intrauterine route.  Disp:  Rfl:       Counseling given: Not Answered       REVIEW OF SYSTEMS:   ROS:  CONSTITUTIONAL:  Denies any unusual weight gain/loss, fever, chil 1-2 weeks  -  Patient is to follow up if symptoms return    2.  Nausea  -  Resolved  -  Could be viral vs. Too high synthroid  -  See above      Meds & Refills for this Visit:  No prescriptions requested or ordered in this encounter    Health Maintenance:

## 2018-03-15 ENCOUNTER — APPOINTMENT (OUTPATIENT)
Dept: LAB | Age: 43
End: 2018-03-15
Attending: FAMILY MEDICINE
Payer: COMMERCIAL

## 2018-03-15 ENCOUNTER — NURSE ONLY (OUTPATIENT)
Dept: FAMILY MEDICINE CLINIC | Facility: CLINIC | Age: 43
End: 2018-03-15

## 2018-03-15 VITALS
DIASTOLIC BLOOD PRESSURE: 72 MMHG | WEIGHT: 175 LBS | BODY MASS INDEX: 30 KG/M2 | OXYGEN SATURATION: 98 % | RESPIRATION RATE: 18 BRPM | HEART RATE: 101 BPM | TEMPERATURE: 99 F | SYSTOLIC BLOOD PRESSURE: 110 MMHG

## 2018-03-15 DIAGNOSIS — E03.9 HYPOTHYROIDISM, UNSPECIFIED TYPE: ICD-10-CM

## 2018-03-15 DIAGNOSIS — J01.11 ACUTE RECURRENT FRONTAL SINUSITIS: ICD-10-CM

## 2018-03-15 DIAGNOSIS — J01.01 ACUTE RECURRENT MAXILLARY SINUSITIS: Primary | ICD-10-CM

## 2018-03-15 PROCEDURE — 84439 ASSAY OF FREE THYROXINE: CPT

## 2018-03-15 PROCEDURE — 84443 ASSAY THYROID STIM HORMONE: CPT

## 2018-03-15 PROCEDURE — 36415 COLL VENOUS BLD VENIPUNCTURE: CPT

## 2018-03-15 PROCEDURE — 99213 OFFICE O/P EST LOW 20 MIN: CPT | Performed by: NURSE PRACTITIONER

## 2018-03-15 RX ORDER — CEFDINIR 300 MG/1
300 CAPSULE ORAL 2 TIMES DAILY
Qty: 20 CAPSULE | Refills: 0 | Status: SHIPPED | OUTPATIENT
Start: 2018-03-15 | End: 2018-03-15 | Stop reason: ALTCHOICE

## 2018-03-15 RX ORDER — MONTELUKAST SODIUM 10 MG/1
TABLET ORAL
Qty: 90 TABLET | Refills: 0 | Status: SHIPPED | OUTPATIENT
Start: 2018-03-15 | End: 2020-06-10

## 2018-03-15 RX ORDER — DOXYCYCLINE HYCLATE 100 MG/1
100 CAPSULE ORAL 2 TIMES DAILY
Qty: 20 CAPSULE | Refills: 0 | Status: SHIPPED | OUTPATIENT
Start: 2018-03-15 | End: 2018-03-25

## 2018-03-15 NOTE — PATIENT INSTRUCTIONS
·  PLAN: Cefdinir take as directed. Finish all the medication even if you feel better. · Probiotics or yogurt daily during antibiotic use will help decrease stomach upset and restore good bacteria to the gut. · Salt water gargles (1 tsp.  Salt in 6 oz damaris

## 2018-03-15 NOTE — PROGRESS NOTES
HPI:   Auther Phalen is a 36year old female who presents with ill symptoms for  2  days. Patient reports congestion, ear pain, sinus pain, OTC cold meds have not been helping, prior history of sinusitis, denies fever.  Has tried sinus medications and comfor (MIRENA IU) by Intrauterine route. Disp:  Rfl:      No current facility-administered medications for this visit.     Past Medical History:   Diagnosis Date   • Chronic rhinitis    • Extrinsic asthma, unspecified    • Hypothyroid    • Hypothyroidism    • Noel mouth 2 (two) times daily. Acute recurrent frontal sinusitis  -     cefdinir 300 MG Oral Cap; Take 1 capsule (300 mg total) by mouth 2 (two) times daily. Start prednisone at home as directed per ENT directions for sinus infection.  Self care discuss

## 2018-03-16 DIAGNOSIS — E03.9 HYPOTHYROIDISM, UNSPECIFIED TYPE: Primary | ICD-10-CM

## 2018-03-16 DIAGNOSIS — E03.9 HYPOTHYROIDISM, UNSPECIFIED TYPE: ICD-10-CM

## 2018-03-16 DIAGNOSIS — E04.1 NONTOXIC UNINODULAR GOITER: ICD-10-CM

## 2018-03-16 LAB
FREE T4: 1 NG/DL (ref 0.9–1.8)
TSI SER-ACNC: 1.74 MIU/ML (ref 0.35–5.5)

## 2018-03-16 RX ORDER — LEVOTHYROXINE SODIUM 137 UG/1
137 TABLET ORAL
Qty: 90 TABLET | Refills: 0 | Status: SHIPPED | OUTPATIENT
Start: 2018-03-16 | End: 2018-06-22

## 2018-04-10 ENCOUNTER — OFFICE VISIT (OUTPATIENT)
Dept: INTERNAL MEDICINE CLINIC | Facility: CLINIC | Age: 43
End: 2018-04-10

## 2018-04-10 VITALS
BODY MASS INDEX: 31.07 KG/M2 | SYSTOLIC BLOOD PRESSURE: 100 MMHG | WEIGHT: 182 LBS | DIASTOLIC BLOOD PRESSURE: 70 MMHG | RESPIRATION RATE: 16 BRPM | HEIGHT: 64 IN | HEART RATE: 80 BPM

## 2018-04-10 DIAGNOSIS — Z51.81 ENCOUNTER FOR THERAPEUTIC DRUG MONITORING: Primary | ICD-10-CM

## 2018-04-10 DIAGNOSIS — E66.3 OVERWEIGHT (BMI 25.0-29.9): ICD-10-CM

## 2018-04-10 PROCEDURE — 99213 OFFICE O/P EST LOW 20 MIN: CPT | Performed by: INTERNAL MEDICINE

## 2018-04-10 RX ORDER — PHENTERMINE HYDROCHLORIDE 37.5 MG/1
37.5 TABLET ORAL
COMMUNITY
End: 2018-04-10

## 2018-04-10 RX ORDER — BUPROPION HYDROCHLORIDE 150 MG/1
150 TABLET, EXTENDED RELEASE ORAL 2 TIMES DAILY
Qty: 60 TABLET | Refills: 0 | Status: SHIPPED | OUTPATIENT
Start: 2018-04-10 | End: 2018-06-04

## 2018-04-10 RX ORDER — PHENTERMINE HYDROCHLORIDE 37.5 MG/1
37.5 TABLET ORAL
Qty: 30 TABLET | Refills: 0 | Status: CANCELLED | OUTPATIENT
Start: 2018-04-10 | End: 2018-05-10

## 2018-04-10 RX ORDER — TOPIRAMATE 50 MG/1
TABLET, FILM COATED ORAL
Qty: 60 TABLET | Refills: 5 | Status: SHIPPED | OUTPATIENT
Start: 2018-04-10 | End: 2018-09-10

## 2018-04-10 RX ORDER — ERGOCALCIFEROL 1.25 MG/1
1 CAPSULE ORAL WEEKLY
Refills: 0 | COMMUNITY
Start: 2018-01-30 | End: 2019-02-25 | Stop reason: ALTCHOICE

## 2018-04-10 RX ORDER — DIETHYLPROPION HYDROCHLORIDE 75 MG/1
1 TABLET ORAL DAILY
Qty: 30 TABLET | Refills: 0 | Status: SHIPPED | OUTPATIENT
Start: 2018-04-10 | End: 2018-05-07

## 2018-04-10 RX ORDER — METFORMIN HYDROCHLORIDE 750 MG/1
1500 TABLET, EXTENDED RELEASE ORAL DAILY
Qty: 60 TABLET | Refills: 5 | Status: SHIPPED | OUTPATIENT
Start: 2018-04-10 | End: 2018-10-22

## 2018-04-11 NOTE — PROGRESS NOTES
HISTORY OF PRESENT ILLNESS  Patient presents with:  Weight Check: up 1894 Mario Alberto Morales is a 36year old female here for follow up in medical weight loss program.   Missed last month and gained 6 lbs  Feels that she just gave up on her weight loss  Feels Results  Component Value Date   EAG 85 01/27/2018   A1C 4.6 01/27/2018       Lab Results  Component Value Date   CHOLEST 145 01/27/2018   TRIG 50 01/27/2018   HDL 53 01/27/2018   LDL 82 01/27/2018   VLDL 10 01/27/2018   TCHDLRATIO 2.74 01/27/2018   Maria Teresa Hr; Take 1 tablet (150 mg total) by mouth 2 (two) times daily.  -     MetFORMIN HCl  MG Oral Tablet 24 Hr; Take 2 tablets (1,500 mg total) by mouth daily.   -     topiramate 50 MG Oral Tab; TAKE 1 TABLET(50 MG) BY MOUTH TWICE DAILY  -     Cancel: Phen

## 2018-05-07 ENCOUNTER — OFFICE VISIT (OUTPATIENT)
Dept: INTERNAL MEDICINE CLINIC | Facility: CLINIC | Age: 43
End: 2018-05-07

## 2018-05-07 VITALS
BODY MASS INDEX: 30.05 KG/M2 | RESPIRATION RATE: 16 BRPM | SYSTOLIC BLOOD PRESSURE: 122 MMHG | DIASTOLIC BLOOD PRESSURE: 78 MMHG | HEIGHT: 64 IN | HEART RATE: 78 BPM | WEIGHT: 176 LBS

## 2018-05-07 DIAGNOSIS — Z51.81 ENCOUNTER FOR THERAPEUTIC DRUG MONITORING: Primary | ICD-10-CM

## 2018-05-07 DIAGNOSIS — E66.9 OBESITY (BMI 30.0-34.9): ICD-10-CM

## 2018-05-07 PROCEDURE — 99213 OFFICE O/P EST LOW 20 MIN: CPT | Performed by: INTERNAL MEDICINE

## 2018-05-07 RX ORDER — DIETHYLPROPION HYDROCHLORIDE 75 MG/1
1 TABLET ORAL DAILY
Qty: 30 TABLET | Refills: 0 | Status: SHIPPED | OUTPATIENT
Start: 2018-05-07 | End: 2018-06-04

## 2018-05-07 RX ORDER — LEVOMEFOLATE CALCIUM 15 MG
15 TABLET ORAL DAILY
Qty: 90 TABLET | Refills: 0 | Status: SHIPPED | OUTPATIENT
Start: 2018-05-07 | End: 2018-06-04

## 2018-05-07 NOTE — PROGRESS NOTES
HISTORY OF PRESENT ILLNESS  Patient presents with:  Weight Check: down 6 lb      Graeme Kofi is a 36year old female here for follow up in medical weight loss program.   Missed last month and gained 6 lbs  Feels that she just gave up on her weight loss  F Results  Component Value Date   EAG 85 01/27/2018   A1C 4.6 01/27/2018       Lab Results  Component Value Date   CHOLEST 145 01/27/2018   TRIG 50 01/27/2018   HDL 53 01/27/2018   LDL 82 01/27/2018   VLDL 10 01/27/2018   TCHDLRATIO 2.74 01/27/2018   Maria Teresa for Wheezing. Disp:  Rfl:    Levonorgestrel (MIRENA IU) by Intrauterine route. Disp:  Rfl:      No current facility-administered medications on file prior to visit.      ASSESSMENT  Analyzed weight data:       Diagnoses and all orders for this visit:    Kaylene Mercado

## 2018-06-04 ENCOUNTER — OFFICE VISIT (OUTPATIENT)
Dept: INTERNAL MEDICINE CLINIC | Facility: CLINIC | Age: 43
End: 2018-06-04

## 2018-06-04 ENCOUNTER — OFFICE VISIT (OUTPATIENT)
Dept: FAMILY MEDICINE CLINIC | Facility: CLINIC | Age: 43
End: 2018-06-04

## 2018-06-04 VITALS
TEMPERATURE: 99 F | RESPIRATION RATE: 16 BRPM | WEIGHT: 173 LBS | DIASTOLIC BLOOD PRESSURE: 78 MMHG | SYSTOLIC BLOOD PRESSURE: 110 MMHG | OXYGEN SATURATION: 98 % | HEIGHT: 64 IN | BODY MASS INDEX: 29.53 KG/M2 | HEART RATE: 76 BPM

## 2018-06-04 VITALS
RESPIRATION RATE: 16 BRPM | WEIGHT: 175 LBS | HEIGHT: 64 IN | DIASTOLIC BLOOD PRESSURE: 60 MMHG | HEART RATE: 70 BPM | SYSTOLIC BLOOD PRESSURE: 122 MMHG | BODY MASS INDEX: 29.88 KG/M2

## 2018-06-04 DIAGNOSIS — Z51.81 ENCOUNTER FOR THERAPEUTIC DRUG MONITORING: ICD-10-CM

## 2018-06-04 DIAGNOSIS — J01.01 ACUTE RECURRENT MAXILLARY SINUSITIS: Primary | ICD-10-CM

## 2018-06-04 DIAGNOSIS — E66.9 OBESITY (BMI 30.0-34.9): ICD-10-CM

## 2018-06-04 PROCEDURE — 99213 OFFICE O/P EST LOW 20 MIN: CPT | Performed by: INTERNAL MEDICINE

## 2018-06-04 PROCEDURE — 99214 OFFICE O/P EST MOD 30 MIN: CPT | Performed by: FAMILY MEDICINE

## 2018-06-04 RX ORDER — CEFDINIR 300 MG/1
300 CAPSULE ORAL 2 TIMES DAILY
Qty: 20 CAPSULE | Refills: 0 | Status: SHIPPED | OUTPATIENT
Start: 2018-06-04 | End: 2018-06-14

## 2018-06-04 RX ORDER — BUPROPION HYDROCHLORIDE 150 MG/1
150 TABLET, EXTENDED RELEASE ORAL 2 TIMES DAILY
Qty: 60 TABLET | Refills: 2 | Status: SHIPPED | OUTPATIENT
Start: 2018-06-04 | End: 2018-10-22

## 2018-06-04 RX ORDER — ALBUTEROL SULFATE 90 UG/1
2 AEROSOL, METERED RESPIRATORY (INHALATION) EVERY 4 HOURS PRN
Qty: 3 INHALER | Refills: 1 | Status: SHIPPED | OUTPATIENT
Start: 2018-06-04 | End: 2020-08-06

## 2018-06-04 RX ORDER — DIETHYLPROPION HYDROCHLORIDE 75 MG/1
1 TABLET ORAL DAILY
Qty: 30 TABLET | Refills: 0 | Status: SHIPPED | OUTPATIENT
Start: 2018-06-04 | End: 2018-08-06

## 2018-06-04 RX ORDER — PREDNISONE 20 MG/1
TABLET ORAL
Qty: 20 TABLET | Refills: 0 | Status: SHIPPED | OUTPATIENT
Start: 2018-06-04 | End: 2018-08-06

## 2018-06-04 NOTE — PROGRESS NOTES
HPI:   Candice Breen is a 36year old female who presents for upper respiratory symptoms for  7  days.  Patient reports congestion, cough with yellow colored sputum, cough is keeping pt up at night, sinus pain, OTC cold meds have not been helping, denies fev Inhalation Aerosol Powder, Breath Activated Inhale 1 puff into the lungs daily.  Disp: 3 each Rfl: 3   tobramycin-dexamethasone 0.3-0.1 % Ophthalmic Suspension  Disp:  Rfl: 7   Albuterol Sulfate (VENTOLIN) (2.5 MG/3ML) 0.083% Inhalation Nebu Soln Take  by n without murmur  GI: good BS's,no masses, HSM or tenderness    ASSESSMENT AND PLAN:   Avril Lee is a 36year old female who presents with Sinusitis. PLAN: Albuterol inhaler two puffs q4hrs prn wheezing and cefdinir 300mg BID x 10 days, prednisone taper.

## 2018-06-04 NOTE — PROGRESS NOTES
Patient teaching on [facility] performed on a demo pen. Patient demonstrated back, all questions were answered and patient verbalized understanding. If covered pt will call us for a nurse visit to do the 1st injection in the office.    PRANEETH

## 2018-06-05 NOTE — PROGRESS NOTES
HISTORY OF PRESENT ILLNESS  Patient presents with:  Weight Check: down 1 lb      Simon Multani is a 36year old female here for follow up in medical weight loss program.     Down 1 lb form previous   Does feel that the diethylpropion is helping curb her willie Results  Component Value Date   EAG 85 01/27/2018   A1C 4.6 01/27/2018       Lab Results  Component Value Date   CHOLEST 145 01/27/2018   TRIG 50 01/27/2018   HDL 53 01/27/2018   LDL 82 01/27/2018   VLDL 10 01/27/2018   TCHDLRATIO 2.74 01/27/2018   Maria Teresa Diethylpropion HCl ER 75 MG Oral Tablet 24 Hr; Take 1 tablet (75 mg total) by mouth daily. Obesity (BMI 30.0-34.9)  -     Diethylpropion HCl ER 75 MG Oral Tablet 24 Hr; Take 1 tablet (75 mg total) by mouth daily.     Other orders  -     BuPROPion HCl ER,

## 2018-06-22 DIAGNOSIS — E03.9 HYPOTHYROIDISM, UNSPECIFIED TYPE: ICD-10-CM

## 2018-06-25 RX ORDER — LEVOTHYROXINE SODIUM 137 UG/1
TABLET ORAL
Qty: 90 TABLET | Refills: 0 | Status: SHIPPED | OUTPATIENT
Start: 2018-06-25 | End: 2018-07-05

## 2018-06-27 ENCOUNTER — TELEPHONE (OUTPATIENT)
Dept: INTERNAL MEDICINE CLINIC | Facility: CLINIC | Age: 43
End: 2018-06-27

## 2018-07-05 DIAGNOSIS — E03.9 HYPOTHYROIDISM, UNSPECIFIED TYPE: ICD-10-CM

## 2018-07-06 RX ORDER — LEVOTHYROXINE SODIUM 137 UG/1
TABLET ORAL
Qty: 30 TABLET | Refills: 0 | Status: SHIPPED | OUTPATIENT
Start: 2018-07-06 | End: 2018-09-10

## 2018-08-06 ENCOUNTER — OFFICE VISIT (OUTPATIENT)
Dept: INTERNAL MEDICINE CLINIC | Facility: CLINIC | Age: 43
End: 2018-08-06

## 2018-08-06 VITALS
WEIGHT: 180 LBS | RESPIRATION RATE: 16 BRPM | SYSTOLIC BLOOD PRESSURE: 118 MMHG | BODY MASS INDEX: 30.73 KG/M2 | DIASTOLIC BLOOD PRESSURE: 70 MMHG | HEIGHT: 64 IN | HEART RATE: 76 BPM

## 2018-08-06 DIAGNOSIS — Z51.81 ENCOUNTER FOR THERAPEUTIC DRUG MONITORING: Primary | ICD-10-CM

## 2018-08-06 DIAGNOSIS — E66.9 OBESITY (BMI 30.0-34.9): ICD-10-CM

## 2018-08-06 DIAGNOSIS — R63.5 WEIGHT GAIN: ICD-10-CM

## 2018-08-06 PROCEDURE — 99214 OFFICE O/P EST MOD 30 MIN: CPT | Performed by: INTERNAL MEDICINE

## 2018-08-06 RX ORDER — DIETHYLPROPION HYDROCHLORIDE 75 MG/1
1 TABLET ORAL DAILY
Qty: 30 TABLET | Refills: 0 | Status: SHIPPED | OUTPATIENT
Start: 2018-08-06 | End: 2018-09-05

## 2018-08-06 RX ORDER — TOPIRAMATE 50 MG/1
50 TABLET, FILM COATED ORAL 2 TIMES DAILY
Qty: 180 TABLET | Refills: 3 | Status: SHIPPED | OUTPATIENT
Start: 2018-08-06 | End: 2018-10-22

## 2018-08-06 RX ORDER — BUPROPION HYDROCHLORIDE 200 MG/1
200 TABLET, EXTENDED RELEASE ORAL 2 TIMES DAILY
Qty: 180 TABLET | Refills: 2 | Status: SHIPPED | OUTPATIENT
Start: 2018-08-06 | End: 2018-09-05

## 2018-08-06 NOTE — PROGRESS NOTES
HISTORY OF PRESENT ILLNESS  Patient presents with:  Weight Check: up 5 lb      Graemedel Kirby is a 36year old female here for follow up in medical weight loss program.     Gained 5 lbs from previous     Does feel that the diethylpropion is helping curb her EAG 85 01/27/2018   A1C 4.6 01/27/2018       Lab Results  Component Value Date   CHOLEST 145 01/27/2018   TRIG 50 01/27/2018   HDL 53 01/27/2018   LDL 82 01/27/2018   VLDL 10 01/27/2018   TCHDLRATIO 2.74 01/27/2018   NONHDLC 92 01/27/2018   CHOLHDLRATIO IU) by Intrauterine route. Disp:  Rfl:      No current facility-administered medications on file prior to visit.      ASSESSMENT  Analyzed weight data:       Diagnoses and all orders for this visit:    Encounter for therapeutic drug monitoring  -     Diethy below for more details. · Discussed strategies to overcome barriers to successful weight loss and weight maintenance  · FITTE: ACSM recommendations (150-300 minutes/ week in active weight loss)    There are no Patient Instructions on file for this visit.

## 2018-09-10 ENCOUNTER — OFFICE VISIT (OUTPATIENT)
Dept: FAMILY MEDICINE CLINIC | Facility: CLINIC | Age: 43
End: 2018-09-10

## 2018-09-10 ENCOUNTER — PATIENT MESSAGE (OUTPATIENT)
Dept: FAMILY MEDICINE CLINIC | Facility: CLINIC | Age: 43
End: 2018-09-10

## 2018-09-10 VITALS
OXYGEN SATURATION: 98 % | RESPIRATION RATE: 14 BRPM | DIASTOLIC BLOOD PRESSURE: 70 MMHG | SYSTOLIC BLOOD PRESSURE: 110 MMHG | HEART RATE: 86 BPM | WEIGHT: 178 LBS | TEMPERATURE: 97 F | BODY MASS INDEX: 30.39 KG/M2 | HEIGHT: 64 IN

## 2018-09-10 DIAGNOSIS — E03.9 ACQUIRED HYPOTHYROIDISM: ICD-10-CM

## 2018-09-10 DIAGNOSIS — J01.01 ACUTE RECURRENT MAXILLARY SINUSITIS: ICD-10-CM

## 2018-09-10 DIAGNOSIS — T75.3XXA MOTION SICKNESS, INITIAL ENCOUNTER: ICD-10-CM

## 2018-09-10 DIAGNOSIS — J45.41 MODERATE PERSISTENT ASTHMA WITH EXACERBATION: Primary | ICD-10-CM

## 2018-09-10 DIAGNOSIS — B35.1 ONYCHOMYCOSIS: ICD-10-CM

## 2018-09-10 PROCEDURE — 94640 AIRWAY INHALATION TREATMENT: CPT | Performed by: FAMILY MEDICINE

## 2018-09-10 PROCEDURE — 99214 OFFICE O/P EST MOD 30 MIN: CPT | Performed by: FAMILY MEDICINE

## 2018-09-10 RX ORDER — CEFDINIR 300 MG/1
300 CAPSULE ORAL 2 TIMES DAILY
Qty: 20 CAPSULE | Refills: 0 | Status: SHIPPED | OUTPATIENT
Start: 2018-09-10 | End: 2018-09-20

## 2018-09-10 RX ORDER — ALBUTEROL SULFATE 2.5 MG/3ML
2.5 SOLUTION RESPIRATORY (INHALATION) EVERY 4 HOURS PRN
Qty: 1 BOX | Refills: 1 | Status: SHIPPED | OUTPATIENT
Start: 2018-09-10 | End: 2019-11-08

## 2018-09-10 RX ORDER — PREDNISONE 20 MG/1
60 TABLET ORAL DAILY
Qty: 21 TABLET | Refills: 0 | Status: SHIPPED | OUTPATIENT
Start: 2018-09-10 | End: 2018-09-17

## 2018-09-10 RX ORDER — ALBUTEROL SULFATE 2.5 MG/3ML
2.5 SOLUTION RESPIRATORY (INHALATION) ONCE
Status: COMPLETED | OUTPATIENT
Start: 2018-09-10 | End: 2018-09-10

## 2018-09-10 RX ORDER — LEVOTHYROXINE SODIUM 137 UG/1
TABLET ORAL
Qty: 90 TABLET | Refills: 0 | Status: SHIPPED | OUTPATIENT
Start: 2018-09-10 | End: 2019-03-26

## 2018-09-10 RX ADMIN — ALBUTEROL SULFATE 2.5 MG: 2.5 SOLUTION RESPIRATORY (INHALATION) at 17:15:00

## 2018-09-10 NOTE — PROGRESS NOTES
HPI:   Candice Breen is a 36year old female who presents for upper respiratory symptoms for 7  days.  Patient reports congestion, yellow colored nasal discharge, cough is keeping pt up at night, sinus pain, wheezing, OTC cold meds have not been helping, nina Rfl: 0   IPRATROPIUM BROMIDE 0.02 % Inhalation Solution USE 2.5 ML VIA NEBULIZER TWICE DAILY Disp: 62.5 mL Rfl: 0   Fluticasone Furoate-Vilanterol (BREO ELLIPTA) 200-25 MCG/INH Inhalation Aerosol Powder, Breath Activated Inhale 1 puff into the lungs daily. Cuff Size: adult)   Pulse 86   Temp 97.4 °F (36.3 °C) (Oral)   Resp 14   Ht 64\"   Wt 178 lb   LMP 08/31/2018 (Approximate)   SpO2 98%   BMI 30.55 kg/m²   GENERAL: well developed, well nourished,in no apparent distress  SKIN: no rashes,no suspicious lesion

## 2018-09-12 RX ORDER — SCOLOPAMINE TRANSDERMAL SYSTEM 1 MG/1
1 PATCH, EXTENDED RELEASE TRANSDERMAL
Qty: 4 PATCH | Refills: 0 | Status: SHIPPED | OUTPATIENT
Start: 2018-09-12 | End: 2018-09-12

## 2018-09-12 RX ORDER — SCOLOPAMINE TRANSDERMAL SYSTEM 1 MG/1
1 PATCH, EXTENDED RELEASE TRANSDERMAL
Qty: 4 PATCH | Refills: 0 | Status: SHIPPED | OUTPATIENT
Start: 2018-09-12 | End: 2018-10-22

## 2018-09-13 ENCOUNTER — OFFICE VISIT (OUTPATIENT)
Dept: FAMILY MEDICINE CLINIC | Facility: CLINIC | Age: 43
End: 2018-09-13

## 2018-09-13 VITALS
OXYGEN SATURATION: 99 % | HEIGHT: 64 IN | DIASTOLIC BLOOD PRESSURE: 82 MMHG | RESPIRATION RATE: 16 BRPM | BODY MASS INDEX: 30.05 KG/M2 | TEMPERATURE: 98 F | HEART RATE: 90 BPM | WEIGHT: 176 LBS | SYSTOLIC BLOOD PRESSURE: 114 MMHG

## 2018-09-13 DIAGNOSIS — J45.41 MODERATE PERSISTENT ASTHMA WITH EXACERBATION: Primary | ICD-10-CM

## 2018-09-13 DIAGNOSIS — J01.01 ACUTE RECURRENT MAXILLARY SINUSITIS: ICD-10-CM

## 2018-09-13 PROCEDURE — 99213 OFFICE O/P EST LOW 20 MIN: CPT | Performed by: FAMILY MEDICINE

## 2018-09-13 NOTE — PROGRESS NOTES
HPI:   Jerilyn Chiu is a 36year old female who presents for upper respiratory symptoms for 7  days. Patient reports that she still has a mild cough but she is no longer wheezing after being on prednisone. Her last albuterol use was yesterday.   She states DAILY Disp: 62.5 mL Rfl: 0   Fluticasone Furoate-Vilanterol (BREO ELLIPTA) 200-25 MCG/INH Inhalation Aerosol Powder, Breath Activated Inhale 1 puff into the lungs daily.  Disp: 3 each Rfl: 3   tobramycin-dexamethasone 0.3-0.1 % Ophthalmic Suspension  Disp: without murmur  GI: good BS's,no masses, HSM or tenderness    ASSESSMENT AND PLAN:   Avril Lee is a 36year old female who presents with:  1. Acute Asthma Exacerbation.  PLAN: Continue albuterol inhaler two puffs q4hrs prn wheezing and finish prednisone

## 2018-10-18 ENCOUNTER — OFFICE VISIT (OUTPATIENT)
Dept: FAMILY MEDICINE CLINIC | Facility: CLINIC | Age: 43
End: 2018-10-18

## 2018-10-18 VITALS
BODY MASS INDEX: 30.05 KG/M2 | HEIGHT: 64 IN | HEART RATE: 80 BPM | SYSTOLIC BLOOD PRESSURE: 116 MMHG | DIASTOLIC BLOOD PRESSURE: 84 MMHG | WEIGHT: 176 LBS | RESPIRATION RATE: 16 BRPM | TEMPERATURE: 98 F | OXYGEN SATURATION: 98 %

## 2018-10-18 DIAGNOSIS — J32.4 CHRONIC PANSINUSITIS: Primary | ICD-10-CM

## 2018-10-18 PROCEDURE — 99213 OFFICE O/P EST LOW 20 MIN: CPT | Performed by: NURSE PRACTITIONER

## 2018-10-18 RX ORDER — PREDNISONE 20 MG/1
40 TABLET ORAL DAILY
Qty: 14 TABLET | Refills: 0 | Status: SHIPPED | OUTPATIENT
Start: 2018-10-18 | End: 2018-10-25

## 2018-10-18 RX ORDER — DOXYCYCLINE HYCLATE 100 MG
100 TABLET ORAL 2 TIMES DAILY
Qty: 20 TABLET | Refills: 0 | Status: SHIPPED | OUTPATIENT
Start: 2018-10-18 | End: 2018-10-28

## 2018-10-18 NOTE — PATIENT INSTRUCTIONS
Chronic Sinusitis  Chronic sinusitis is a long-term swelling or infection of the sinuses. If sinusitis lasts more than 12 weeks, it is called chronic. What is chronic sinusitis? Sinuses are air-filled spaces in the skull behind the face.  They are kep · Taking medicines. Your doctor may prescribe medicines to reduce the amount of mucus and swelling. These help unblock the sinuses and allow them to drain. You will need to take antibiotics if you have a bacterial infection. · Flushing your sinuses.  Your Applying heat to the area surrounding your sinuses may make you feel more comfortable. Use a hot water bottle or a hand towel dipped in hot water. Some people also find ice packs effective for relieving pain.   Medicines  Your doctor may prescribe medicatio

## 2018-10-18 NOTE — PROGRESS NOTES
CHIEF COMPLAINT:   Patient presents with:  Nasal Congestion: sinus congestion, cough    HPI:   Jake Ivan is a 36year old female who presents for sinus congestion for  2  weeks. Symptoms have been worsening since onset.  Sinus congestion/pain is describe Cyanocobalamin (VITAMIN B-12 OR) Take by mouth. Disp:  Rfl:    ergocalciferol 75761 units Oral Cap Take 1 capsule by mouth once a week.  Disp:  Rfl: 0   MONTELUKAST SODIUM 10 MG Oral Tab TAKE 1 TABLET BY MOUTH EVERY NIGHT Disp: 90 tablet Rfl: 0   Fluticason /84   Pulse 80   Temp 98.4 °F (36.9 °C) (Oral)   Resp 16   Ht 64\"   Wt 176 lb   SpO2 98%   BMI 30.21 kg/m²   GENERAL: well developed, well nourished,in no apparent distress  SKIN: no rashes,no suspicious lesions  HEAD: atraumatic, normocephalic,  no Sig: Take 1 tablet (100 mg total) by mouth 2 (two) times daily for 10 days. Risks, benefits, side effects of medication addressed and explained.     Patient Instructions       Chronic Sinusitis  Chronic sinusitis is a long-term swelling or infection Treatment involves reducing irritation and inflammation. Your plan may include:  · Taking medicines. Your doctor may prescribe medicines to reduce the amount of mucus and swelling. These help unblock the sinuses and allow them to drain.  You will need to ta Rinses help keep your sinuses and nose moist. Mix a teaspoon of salt in 8 ounces of fresh, warm water. Use a bulb syringe to gently squirt the water into your nose a few times a day. You can also buy ready-made saline nasal sprays.   Apply hot or cold packs

## 2018-10-22 ENCOUNTER — OFFICE VISIT (OUTPATIENT)
Dept: INTERNAL MEDICINE CLINIC | Facility: CLINIC | Age: 43
End: 2018-10-22

## 2018-10-22 VITALS
SYSTOLIC BLOOD PRESSURE: 108 MMHG | DIASTOLIC BLOOD PRESSURE: 70 MMHG | WEIGHT: 177 LBS | HEART RATE: 78 BPM | HEIGHT: 64 IN | RESPIRATION RATE: 16 BRPM | BODY MASS INDEX: 30.22 KG/M2

## 2018-10-22 DIAGNOSIS — E66.9 OBESITY (BMI 30.0-34.9): ICD-10-CM

## 2018-10-22 DIAGNOSIS — Z51.81 ENCOUNTER FOR THERAPEUTIC DRUG MONITORING: Primary | ICD-10-CM

## 2018-10-22 PROCEDURE — 99213 OFFICE O/P EST LOW 20 MIN: CPT | Performed by: INTERNAL MEDICINE

## 2018-10-22 RX ORDER — PHENTERMINE HYDROCHLORIDE 37.5 MG/1
37.5 TABLET ORAL
Qty: 30 TABLET | Refills: 0 | Status: SHIPPED | OUTPATIENT
Start: 2018-10-22 | End: 2019-05-16 | Stop reason: ALTCHOICE

## 2018-10-22 RX ORDER — BUPROPION HYDROCHLORIDE 150 MG/1
150 TABLET, EXTENDED RELEASE ORAL 2 TIMES DAILY
Qty: 60 TABLET | Refills: 2 | Status: SHIPPED | OUTPATIENT
Start: 2018-10-22 | End: 2019-06-19

## 2018-10-22 RX ORDER — TOPIRAMATE 50 MG/1
50 TABLET, FILM COATED ORAL 2 TIMES DAILY
Qty: 180 TABLET | Refills: 3 | Status: SHIPPED | OUTPATIENT
Start: 2018-10-22 | End: 2019-03-26

## 2018-10-22 RX ORDER — DIETHYLPROPION HYDROCHLORIDE 75 MG/1
1 TABLET ORAL DAILY
Qty: 30 TABLET | Refills: 0 | Status: CANCELLED | OUTPATIENT
Start: 2018-10-22 | End: 2018-11-21

## 2018-10-22 RX ORDER — METFORMIN HYDROCHLORIDE 750 MG/1
1500 TABLET, EXTENDED RELEASE ORAL DAILY
Qty: 60 TABLET | Refills: 5 | Status: SHIPPED | OUTPATIENT
Start: 2018-10-22 | End: 2019-06-19

## 2018-10-23 NOTE — PROGRESS NOTES
HISTORY OF PRESENT ILLNESS  Patient presents with:  Weight Check: down 3 lb      Chaya Escalera is a 36year old female here for follow up in medical weight loss program.     Down 3 lbs from previous visit.   Does not feel the diethylpropion is providing as g 01/17/2015    ALBGLOBRAT 1.7 01/17/2015     01/27/2018    K 3.7 01/27/2018     01/27/2018    CO2 26.0 01/27/2018     Lab Results   Component Value Date    EAG 85 01/27/2018    A1C 4.6 01/27/2018     Lab Results   Component Value Date    CHOLEST tobramycin-dexamethasone 0.3-0.1 % Ophthalmic Suspension  Disp:  Rfl: 7   Levonorgestrel (MIRENA IU) by Intrauterine route. Disp:  Rfl:      No current facility-administered medications on file prior to visit.      ASSESSMENT  Analyzed weight data: successful weight loss and weight maintenance  · FITTE: ACSM recommendations (150-300 minutes/ week in active weight loss)    There are no Patient Instructions on file for this visit. Return in about 4 weeks (around 11/19/2018).     Patient verbalizes un

## 2018-11-28 ENCOUNTER — TELEPHONE (OUTPATIENT)
Dept: FAMILY MEDICINE CLINIC | Facility: CLINIC | Age: 43
End: 2018-11-28

## 2018-11-28 DIAGNOSIS — J32.0 CHRONIC MAXILLARY SINUSITIS: Primary | ICD-10-CM

## 2018-11-28 NOTE — TELEPHONE ENCOUNTER
Referral to EDMUNDO Kirby (EXTERNAL)    Please see message below and advise if okay to refer for follow-up visit. This is an external referral that has been authorized to this provider in the past (2/4/16 thru 1/31/17).  Provider is through

## 2018-12-05 NOTE — TELEPHONE ENCOUNTER
Patient has chronic maxillary sinusitis. This should be well addressed by ENT with Dr. Julianna Campuzano. Referral is already in place.

## 2018-12-05 NOTE — TELEPHONE ENCOUNTER
[facility]; [facility] Referral Pool             Hello,     This referral was reviewed by the Medical Director Troy Hameed. Per Troy Hameed, patient should be directed to in network ENT.  Please discuss this with the PCP

## 2018-12-05 NOTE — TELEPHONE ENCOUNTER
Spoke with pt and advised her external referral has been denied and that she has been referred to Dr. Katty Mclean. Offered pt phone number for Dr. Katty Mclean but pt declined number.

## 2019-02-25 ENCOUNTER — OFFICE VISIT (OUTPATIENT)
Dept: FAMILY MEDICINE CLINIC | Facility: CLINIC | Age: 44
End: 2019-02-25

## 2019-02-25 VITALS
SYSTOLIC BLOOD PRESSURE: 110 MMHG | HEIGHT: 64 IN | TEMPERATURE: 98 F | WEIGHT: 178 LBS | HEART RATE: 99 BPM | RESPIRATION RATE: 16 BRPM | BODY MASS INDEX: 30.39 KG/M2 | DIASTOLIC BLOOD PRESSURE: 70 MMHG

## 2019-02-25 DIAGNOSIS — J45.40 MODERATE PERSISTENT EXTRINSIC ASTHMA WITHOUT COMPLICATION: Primary | ICD-10-CM

## 2019-02-25 PROBLEM — J45.909 EXTRINSIC ASTHMA (HCC): Status: ACTIVE | Noted: 2019-02-25

## 2019-02-25 PROBLEM — J45.909 EXTRINSIC ASTHMA: Status: ACTIVE | Noted: 2019-02-25

## 2019-02-25 PROCEDURE — 99214 OFFICE O/P EST MOD 30 MIN: CPT | Performed by: FAMILY MEDICINE

## 2019-02-25 RX ORDER — ALBUTEROL SULFATE 90 UG/1
2 AEROSOL, METERED RESPIRATORY (INHALATION) EVERY 4 HOURS PRN
Qty: 3 INHALER | Refills: 3 | Status: SHIPPED | OUTPATIENT
Start: 2019-02-25 | End: 2019-10-11

## 2019-02-25 NOTE — PROGRESS NOTES
[facility] Family Medicine Office Note  Chief Complaint:   Patient presents with: Other: fill out FMLA papers      HPI:   This is a 39year old female coming in for recheck of asthma sx's.  Lately the patient's asthma has been  under excellent co MetFORMIN HCl  MG Oral Tablet 24 Hr Take 2 tablets (1,500 mg total) by mouth daily. Disp: 60 tablet Rfl: 5   topiramate (TOPAMAX) 50 MG Oral Tab Take 1 tablet (50 mg total) by mouth 2 (two) times daily. Disp: 180 tablet Rfl: 3   Phentermine HCl 37. vomiting  NEUROLOGICAL:  Denies headache, dizziness, syncope  MUSCULOSKELETAL:  Denies muscle, back pain, joint pain or stiffness.   ALLERGIES:  + asthma and rhinitis    EXAM:   /70 (BP Location: Left arm, Patient Position: Sitting, Cuff Size: adult) learning. Medical education done. Outcome: Patient verbalizes understanding. Patient is notified to call with any questions, complications, allergies, or worsening or changing symptoms.   Patient is to call with any side effects or complications from the

## 2019-03-26 ENCOUNTER — OFFICE VISIT (OUTPATIENT)
Dept: FAMILY MEDICINE CLINIC | Facility: CLINIC | Age: 44
End: 2019-03-26

## 2019-03-26 ENCOUNTER — APPOINTMENT (OUTPATIENT)
Dept: LAB | Age: 44
End: 2019-03-26
Attending: FAMILY MEDICINE
Payer: COMMERCIAL

## 2019-03-26 VITALS
DIASTOLIC BLOOD PRESSURE: 78 MMHG | SYSTOLIC BLOOD PRESSURE: 124 MMHG | BODY MASS INDEX: 31.41 KG/M2 | HEART RATE: 90 BPM | HEIGHT: 64 IN | TEMPERATURE: 97 F | RESPIRATION RATE: 16 BRPM | WEIGHT: 184 LBS

## 2019-03-26 DIAGNOSIS — F32.1 CURRENT MODERATE EPISODE OF MAJOR DEPRESSIVE DISORDER WITHOUT PRIOR EPISODE (HCC): ICD-10-CM

## 2019-03-26 DIAGNOSIS — E03.9 ACQUIRED HYPOTHYROIDISM: ICD-10-CM

## 2019-03-26 DIAGNOSIS — B35.1 ONYCHOMYCOSIS: ICD-10-CM

## 2019-03-26 DIAGNOSIS — J01.01 ACUTE RECURRENT MAXILLARY SINUSITIS: Primary | ICD-10-CM

## 2019-03-26 LAB
ALBUMIN SERPL-MCNC: 3.9 G/DL (ref 3.4–5)
ALBUMIN/GLOB SERPL: 1.1 {RATIO} (ref 1–2)
ALP LIVER SERPL-CCNC: 97 U/L (ref 37–98)
ALT SERPL-CCNC: 29 U/L (ref 13–56)
ANION GAP SERPL CALC-SCNC: 6 MMOL/L (ref 0–18)
AST SERPL-CCNC: 13 U/L (ref 15–37)
BILIRUB SERPL-MCNC: 0.2 MG/DL (ref 0.1–2)
BUN BLD-MCNC: 9 MG/DL (ref 7–18)
BUN/CREAT SERPL: 12 (ref 10–20)
CALCIUM BLD-MCNC: 8.9 MG/DL (ref 8.5–10.1)
CHLORIDE SERPL-SCNC: 107 MMOL/L (ref 98–107)
CO2 SERPL-SCNC: 26 MMOL/L (ref 21–32)
CREAT BLD-MCNC: 0.75 MG/DL (ref 0.55–1.02)
GLOBULIN PLAS-MCNC: 3.7 G/DL (ref 2.8–4.4)
GLUCOSE BLD-MCNC: 83 MG/DL (ref 70–99)
M PROTEIN MFR SERPL ELPH: 7.6 G/DL (ref 6.4–8.2)
OSMOLALITY SERPL CALC.SUM OF ELEC: 286 MOSM/KG (ref 275–295)
POTASSIUM SERPL-SCNC: 4 MMOL/L (ref 3.5–5.1)
SODIUM SERPL-SCNC: 139 MMOL/L (ref 136–145)
T4 FREE SERPL-MCNC: 0.6 NG/DL (ref 0.8–1.7)
TSI SER-ACNC: >100 MIU/ML (ref 0.36–3.74)

## 2019-03-26 PROCEDURE — 36415 COLL VENOUS BLD VENIPUNCTURE: CPT

## 2019-03-26 PROCEDURE — 84439 ASSAY OF FREE THYROXINE: CPT

## 2019-03-26 PROCEDURE — 84443 ASSAY THYROID STIM HORMONE: CPT

## 2019-03-26 PROCEDURE — 80053 COMPREHEN METABOLIC PANEL: CPT

## 2019-03-26 PROCEDURE — 99214 OFFICE O/P EST MOD 30 MIN: CPT | Performed by: FAMILY MEDICINE

## 2019-03-26 RX ORDER — TOPIRAMATE 50 MG/1
50 TABLET, FILM COATED ORAL 2 TIMES DAILY
Qty: 180 TABLET | Refills: 1 | Status: SHIPPED | OUTPATIENT
Start: 2019-03-26 | End: 2019-06-19

## 2019-03-26 RX ORDER — VENLAFAXINE HYDROCHLORIDE 75 MG/1
CAPSULE, EXTENDED RELEASE ORAL
Qty: 45 CAPSULE | Refills: 0 | Status: SHIPPED | OUTPATIENT
Start: 2019-03-26 | End: 2019-08-15

## 2019-03-26 RX ORDER — LEVOTHYROXINE SODIUM 137 UG/1
TABLET ORAL
Qty: 90 TABLET | Refills: 1 | Status: SHIPPED | OUTPATIENT
Start: 2019-03-26 | End: 2019-05-16

## 2019-03-26 RX ORDER — PREDNISONE 20 MG/1
TABLET ORAL
Qty: 20 TABLET | Refills: 0 | Status: SHIPPED | OUTPATIENT
Start: 2019-03-26 | End: 2019-05-16 | Stop reason: ALTCHOICE

## 2019-03-26 RX ORDER — DOXYCYCLINE HYCLATE 100 MG
100 TABLET ORAL 2 TIMES DAILY
Qty: 20 TABLET | Refills: 0 | Status: SHIPPED | OUTPATIENT
Start: 2019-03-26 | End: 2019-05-16 | Stop reason: ALTCHOICE

## 2019-03-27 ENCOUNTER — TELEPHONE (OUTPATIENT)
Dept: FAMILY MEDICINE CLINIC | Facility: CLINIC | Age: 44
End: 2019-03-27

## 2019-03-27 DIAGNOSIS — E03.9 HYPOTHYROIDISM, UNSPECIFIED TYPE: Primary | ICD-10-CM

## 2019-03-27 RX ORDER — LEVOTHYROXINE SODIUM 0.15 MG/1
150 TABLET ORAL
Qty: 90 TABLET | Refills: 0 | Status: SHIPPED | OUTPATIENT
Start: 2019-03-27 | End: 2019-07-18

## 2019-03-27 NOTE — PROGRESS NOTES
HPI:   Graeme Kirby is a 39year old female who presents for upper respiratory symptoms for  3  weeks.  Patient reports congestion, dry cough, cough is keeping pt up at night, sinus pain, wheezing, OTC cold meds have not been helping, prior history of bronc Nebu Soln Take 3 mL (2.5 mg total) by nebulization every 4 (four) hours as needed for Wheezing. Disp: 1 Box Rfl: 1   Albuterol Sulfate  (90 Base) MCG/ACT Inhalation Aero Soln Inhale 2 puffs into the lungs every 4 (four) hours as needed for Wheezing. 16   Ht 64\"   Wt 184 lb   BMI 31.58 kg/m²   GENERAL: well developed, well nourished,in no apparent distress  SKIN: no rashes,no suspicious lesions  EYES:PERRL, EOMI,conjunctiva are clear  HEENT: atraumatic, normocephalic,ears and throat are clear; no maxi

## 2019-04-03 PROCEDURE — 87624 HPV HI-RISK TYP POOLED RSLT: CPT | Performed by: NURSE PRACTITIONER

## 2019-04-03 PROCEDURE — 88175 CYTOPATH C/V AUTO FLUID REDO: CPT | Performed by: NURSE PRACTITIONER

## 2019-04-03 PROCEDURE — 87625 HPV TYPES 16 & 18 ONLY: CPT | Performed by: NURSE PRACTITIONER

## 2019-04-03 PROCEDURE — 87086 URINE CULTURE/COLONY COUNT: CPT | Performed by: NURSE PRACTITIONER

## 2019-04-18 PROBLEM — R87.610 ASCUS WITH POSITIVE HIGH RISK HPV CERVICAL: Status: ACTIVE | Noted: 2019-04-18

## 2019-04-18 PROBLEM — R87.810 ASCUS WITH POSITIVE HIGH RISK HPV CERVICAL: Status: ACTIVE | Noted: 2019-04-18

## 2019-04-18 PROCEDURE — 88305 TISSUE EXAM BY PATHOLOGIST: CPT | Performed by: OBSTETRICS & GYNECOLOGY

## 2019-04-27 ENCOUNTER — APPOINTMENT (OUTPATIENT)
Dept: CT IMAGING | Facility: HOSPITAL | Age: 44
End: 2019-04-27
Attending: EMERGENCY MEDICINE
Payer: COMMERCIAL

## 2019-04-27 ENCOUNTER — HOSPITAL ENCOUNTER (EMERGENCY)
Facility: HOSPITAL | Age: 44
Discharge: HOME | End: 2019-04-27
Attending: EMERGENCY MEDICINE
Payer: COMMERCIAL

## 2019-04-27 VITALS
HEIGHT: 64 IN | HEART RATE: 79 BPM | SYSTOLIC BLOOD PRESSURE: 108 MMHG | RESPIRATION RATE: 18 BRPM | OXYGEN SATURATION: 98 % | TEMPERATURE: 99 F | BODY MASS INDEX: 29.53 KG/M2 | DIASTOLIC BLOOD PRESSURE: 76 MMHG | WEIGHT: 173 LBS

## 2019-04-27 DIAGNOSIS — N20.0 KIDNEY STONE: Primary | ICD-10-CM

## 2019-04-27 PROCEDURE — 96375 TX/PRO/DX INJ NEW DRUG ADDON: CPT

## 2019-04-27 PROCEDURE — 96374 THER/PROPH/DIAG INJ IV PUSH: CPT

## 2019-04-27 PROCEDURE — 85025 COMPLETE CBC W/AUTO DIFF WBC: CPT | Performed by: EMERGENCY MEDICINE

## 2019-04-27 PROCEDURE — 87086 URINE CULTURE/COLONY COUNT: CPT | Performed by: EMERGENCY MEDICINE

## 2019-04-27 PROCEDURE — 99285 EMERGENCY DEPT VISIT HI MDM: CPT

## 2019-04-27 PROCEDURE — 96361 HYDRATE IV INFUSION ADD-ON: CPT

## 2019-04-27 PROCEDURE — 81025 URINE PREGNANCY TEST: CPT

## 2019-04-27 PROCEDURE — 83690 ASSAY OF LIPASE: CPT | Performed by: EMERGENCY MEDICINE

## 2019-04-27 PROCEDURE — 74176 CT ABD & PELVIS W/O CONTRAST: CPT | Performed by: EMERGENCY MEDICINE

## 2019-04-27 PROCEDURE — 96376 TX/PRO/DX INJ SAME DRUG ADON: CPT

## 2019-04-27 PROCEDURE — 80053 COMPREHEN METABOLIC PANEL: CPT | Performed by: EMERGENCY MEDICINE

## 2019-04-27 PROCEDURE — 81001 URINALYSIS AUTO W/SCOPE: CPT | Performed by: EMERGENCY MEDICINE

## 2019-04-27 RX ORDER — ONDANSETRON 4 MG/1
4 TABLET, ORALLY DISINTEGRATING ORAL EVERY 4 HOURS PRN
Qty: 10 TABLET | Refills: 0 | Status: SHIPPED | OUTPATIENT
Start: 2019-04-27 | End: 2019-05-04

## 2019-04-27 RX ORDER — ONDANSETRON 2 MG/ML
4 INJECTION INTRAMUSCULAR; INTRAVENOUS ONCE
Status: COMPLETED | OUTPATIENT
Start: 2019-04-27 | End: 2019-04-27

## 2019-04-27 RX ORDER — SODIUM CHLORIDE 9 MG/ML
1000 INJECTION, SOLUTION INTRAVENOUS ONCE
Status: COMPLETED | OUTPATIENT
Start: 2019-04-27 | End: 2019-04-27

## 2019-04-27 RX ORDER — HYDROCODONE BITARTRATE AND ACETAMINOPHEN 5; 325 MG/1; MG/1
1-2 TABLET ORAL EVERY 6 HOURS PRN
Qty: 15 TABLET | Refills: 0 | Status: SHIPPED | OUTPATIENT
Start: 2019-04-27 | End: 2019-05-16 | Stop reason: ALTCHOICE

## 2019-04-27 RX ORDER — TAMSULOSIN HYDROCHLORIDE 0.4 MG/1
0.4 CAPSULE ORAL DAILY
Qty: 7 CAPSULE | Refills: 0 | Status: SHIPPED | OUTPATIENT
Start: 2019-04-27 | End: 2019-05-04

## 2019-04-27 RX ORDER — MORPHINE SULFATE 4 MG/ML
2 INJECTION, SOLUTION INTRAMUSCULAR; INTRAVENOUS ONCE
Status: COMPLETED | OUTPATIENT
Start: 2019-04-27 | End: 2019-04-27

## 2019-04-27 NOTE — ED PROVIDER NOTES
Patient Seen in: [facility] Emergency Department    History   Patient presents with:  Abdomen/Flank Pain (GI/)    Stated Complaint: abd pain    HPI    Patient is a 26-year-old female who presents emergency room with a history of an acute onset of (Temporal)   Resp 18   Ht 162.6 cm (5' 4\")   Wt 78.5 kg   SpO2 98%   BMI 29.70 kg/m²         Physical Exam  GENERAL: Well-developed, well-nourished femalesitting up breathing easily in no apparent distress. Patient is nontoxic in appearance.   HEENT: Head AST 9 (*)     All other components within normal limits   LIPASE - Normal   CBC WITH DIFFERENTIAL WITH PLATELET    Narrative: The following orders were created for panel order CBC WITH DIFFERENTIAL WITH PLATELET.   Procedure liver function test and lipase are negative. Patient's urinalysis showed evidence of blood but no evidence of any infection. The patient is not pregnant.   Patient was observed for several hours in the emergency room and did have a CT scan done which show tablet, R-0    tamsulosin HCl (FLOMAX) 0.4 MG Oral Cap  Take 1 capsule (0.4 mg total) by mouth daily for 7 days. , Print Script, Disp-7 capsule, R-0    ondansetron 4 MG Oral Tablet Dispersible  Take 1 tablet (4 mg total) by mouth every 4 (four) hours as nee

## 2019-04-27 NOTE — ED INITIAL ASSESSMENT (HPI)
Patient reports sharp LRQ abdominal pain that radiates to the right flank. Started around noon today. Reports nausea, denies any vomiting or diarrhea. Reports urinary urgency.

## 2019-05-03 ENCOUNTER — TELEPHONE (OUTPATIENT)
Dept: FAMILY MEDICINE CLINIC | Facility: CLINIC | Age: 44
End: 2019-05-03

## 2019-05-03 DIAGNOSIS — N20.0 KIDNEY STONES: Primary | ICD-10-CM

## 2019-05-03 NOTE — TELEPHONE ENCOUNTER
Referral to [facility], Urologist (INTERNAL)    Reason for the order/referral: KIDNEY STONES   PCP: Angel Luis Ag   Refer to Provider: Ry Daniels   Specialty: UROLOGY   Patient Insurance: Payor: Omero Bojorquez / Plan: [id number] HMOI / Product Type: HMO Duration/Summary of

## 2019-05-07 PROCEDURE — 87086 URINE CULTURE/COLONY COUNT: CPT | Performed by: PHYSICIAN ASSISTANT

## 2019-05-07 PROCEDURE — 81015 MICROSCOPIC EXAM OF URINE: CPT | Performed by: PHYSICIAN ASSISTANT

## 2019-05-16 ENCOUNTER — OFFICE VISIT (OUTPATIENT)
Dept: FAMILY MEDICINE CLINIC | Facility: CLINIC | Age: 44
End: 2019-05-16

## 2019-05-16 VITALS
RESPIRATION RATE: 16 BRPM | TEMPERATURE: 98 F | OXYGEN SATURATION: 97 % | HEART RATE: 86 BPM | BODY MASS INDEX: 29.53 KG/M2 | DIASTOLIC BLOOD PRESSURE: 74 MMHG | SYSTOLIC BLOOD PRESSURE: 110 MMHG | WEIGHT: 173 LBS | HEIGHT: 64 IN

## 2019-05-16 DIAGNOSIS — J30.2 SEASONAL ALLERGIES: ICD-10-CM

## 2019-05-16 DIAGNOSIS — Z00.00 ROUTINE GENERAL MEDICAL EXAMINATION AT A HEALTH CARE FACILITY: Primary | ICD-10-CM

## 2019-05-16 DIAGNOSIS — J45.40 MODERATE PERSISTENT EXTRINSIC ASTHMA WITHOUT COMPLICATION: ICD-10-CM

## 2019-05-16 DIAGNOSIS — E03.9 ACQUIRED HYPOTHYROIDISM: ICD-10-CM

## 2019-05-16 DIAGNOSIS — L20.9 ATOPIC DERMATITIS, UNSPECIFIED TYPE: ICD-10-CM

## 2019-05-16 DIAGNOSIS — J01.01 ACUTE RECURRENT MAXILLARY SINUSITIS: ICD-10-CM

## 2019-05-16 PROCEDURE — 99396 PREV VISIT EST AGE 40-64: CPT | Performed by: FAMILY MEDICINE

## 2019-05-16 PROCEDURE — 99213 OFFICE O/P EST LOW 20 MIN: CPT | Performed by: FAMILY MEDICINE

## 2019-05-16 RX ORDER — DOXYCYCLINE HYCLATE 100 MG
100 TABLET ORAL 2 TIMES DAILY
Qty: 20 TABLET | Refills: 0 | Status: SHIPPED | OUTPATIENT
Start: 2019-05-16 | End: 2019-05-26

## 2019-05-16 RX ORDER — CETIRIZINE HYDROCHLORIDE 10 MG/1
10 TABLET ORAL DAILY
Qty: 90 TABLET | Refills: 1 | Status: SHIPPED | OUTPATIENT
Start: 2019-05-16 | End: 2019-10-01

## 2019-05-16 RX ORDER — PREDNISONE 20 MG/1
60 TABLET ORAL DAILY
Qty: 21 TABLET | Refills: 0 | Status: SHIPPED | OUTPATIENT
Start: 2019-05-16 | End: 2019-05-23

## 2019-05-16 NOTE — PATIENT INSTRUCTIONS
Prevention Guidelines, Women Ages 36 to 52  Screening tests and vaccines are an important part of managing your health. A screening test is done to find possible disorders or diseases in people who don't have any symptoms.  The goal is to find a disease e Depression All women in this age group At routine exams   Gonorrhea Sexually active women at increased risk for infection At routine exams   Hepatitis C Anyone at increased risk; 1 time for those born between [facility] At routine exams   High cholester Meningococcal Women at increased risk for infection–talk with your healthcare provider 1 or more doses   Pneumococcal conjugate vaccine (PCV13) and pneumococcal polysaccharide vaccine (PPSV23) Women at increased risk for infection–talk with your healthcare

## 2019-05-17 NOTE — PROGRESS NOTES
HPI:   Bee Rosado is a 39year old female who presents for a complete physical exam. Symptoms: denies discharge, itching, burning or dysuria. Patient complains of persistent cough/wheezing, worsening allergies, sinus pressure and a rash.   Patient has a h None Seen    Squamous Epi.  Cells Moderate (A) Small /LPF    Renal Tubular Epithelial Cells None Seen Small /LPF    Transitional Cells None Seen Small /LPF    Mucous Urine 1+ (A) None Seen    Yeast Urine None Seen None Seen    Non-Squamous Epithelial None S puffs into the lungs every 4 (four) hours as needed for Wheezing. Disp: 3 Inhaler Rfl: 1   Cyanocobalamin (VITAMIN B-12 OR) Take by mouth.  Disp:  Rfl:    MONTELUKAST SODIUM 10 MG Oral Tab TAKE 1 TABLET BY MOUTH EVERY NIGHT Disp: 90 tablet Rfl: 0   [facility].   Diet: watches fats closely, watches sugar closely and watches calories closely     REVIEW OF SYSTEMS:   GENERAL: feels well otherwise  SKIN: denies any unusual skin lesions  EYES:denies blurred vision or double vision  HEENT: + nasal congestio Self breast exam explained.    Health maintenance, will check: Orders Placed This Encounter      LIPID PANEL      CBC W/DIFF      COMP METABOLIC PANEL      UA/M With Culture Reflex [E]      TSH and Free T4      Asthma - uncontrolled, change breo to advair Consults:  None    The patient is asked to return in 3 months for med check.

## 2019-06-19 ENCOUNTER — OFFICE VISIT (OUTPATIENT)
Dept: INTERNAL MEDICINE CLINIC | Facility: CLINIC | Age: 44
End: 2019-06-19

## 2019-06-19 VITALS
SYSTOLIC BLOOD PRESSURE: 100 MMHG | RESPIRATION RATE: 16 BRPM | HEIGHT: 64 IN | WEIGHT: 171 LBS | DIASTOLIC BLOOD PRESSURE: 60 MMHG | HEART RATE: 96 BPM | BODY MASS INDEX: 29.19 KG/M2

## 2019-06-19 DIAGNOSIS — E03.9 HYPOTHYROIDISM, UNSPECIFIED TYPE: ICD-10-CM

## 2019-06-19 DIAGNOSIS — F43.9 STRESS: ICD-10-CM

## 2019-06-19 DIAGNOSIS — E66.9 OBESITY (BMI 30.0-34.9): ICD-10-CM

## 2019-06-19 DIAGNOSIS — Z51.81 ENCOUNTER FOR THERAPEUTIC DRUG MONITORING: Primary | ICD-10-CM

## 2019-06-19 PROCEDURE — 99214 OFFICE O/P EST MOD 30 MIN: CPT | Performed by: INTERNAL MEDICINE

## 2019-06-19 RX ORDER — BUPROPION HYDROCHLORIDE 150 MG/1
150 TABLET, EXTENDED RELEASE ORAL 2 TIMES DAILY
Qty: 60 TABLET | Refills: 2 | Status: SHIPPED | OUTPATIENT
Start: 2019-06-19 | End: 2019-08-15

## 2019-06-19 RX ORDER — METFORMIN HYDROCHLORIDE 750 MG/1
1500 TABLET, EXTENDED RELEASE ORAL DAILY
Qty: 60 TABLET | Refills: 5 | Status: SHIPPED | OUTPATIENT
Start: 2019-06-19 | End: 2019-08-05

## 2019-06-19 RX ORDER — TOPIRAMATE 50 MG/1
50 TABLET, FILM COATED ORAL 2 TIMES DAILY
Qty: 180 TABLET | Refills: 1 | Status: SHIPPED | OUTPATIENT
Start: 2019-06-19 | End: 2019-08-05

## 2019-06-19 RX ORDER — PHENTERMINE HYDROCHLORIDE 37.5 MG/1
37.5 TABLET ORAL
Qty: 30 TABLET | Refills: 1 | Status: SHIPPED | OUTPATIENT
Start: 2019-06-19 | End: 2019-08-05

## 2019-06-20 NOTE — PROGRESS NOTES
HISTORY OF PRESENT ILLNESS  Patient presents with:  Weight Check: down 6 lbs      Stephanie Case is a 39year old female here for follow up in medical weight loss program.     Here for follow up   Down 6 lb from previous visit.    Has been under significant s GLOBULT 2.5 01/17/2015    GLOBULIN 3.6 04/27/2019    ALBGLOBRAT 1.7 01/17/2015     04/27/2019    K 3.9 04/27/2019     (H) 04/27/2019    CO2 22.0 04/27/2019     Lab Results   Component Value Date    EAG 85 01/27/2018    A1C 4.6 01/27/2018     La NEBULIZER TWICE DAILY Disp: 62.5 mL Rfl: 0   Fluticasone Furoate-Vilanterol (BREO ELLIPTA) 200-25 MCG/INH Inhalation Aerosol Powder, Breath Activated Inhale 1 puff into the lungs daily.  Disp: 3 each Rfl: 3   Levonorgestrel (MIRENA IU) by Intrauterine route · Follow up with dietitian and psychologist as recommended. · Discussed the role of sleep and stress in weight management.   · Counseled on comprehensive weight loss plan including attention to nutrition, exercise and behavior/stress management for succe

## 2019-07-19 RX ORDER — LEVOTHYROXINE SODIUM 0.15 MG/1
TABLET ORAL
Qty: 90 TABLET | Refills: 0 | Status: SHIPPED | OUTPATIENT
Start: 2019-07-19 | End: 2019-10-01

## 2019-08-05 ENCOUNTER — OFFICE VISIT (OUTPATIENT)
Dept: INTERNAL MEDICINE CLINIC | Facility: CLINIC | Age: 44
End: 2019-08-05

## 2019-08-05 VITALS
WEIGHT: 164 LBS | RESPIRATION RATE: 16 BRPM | HEART RATE: 78 BPM | HEIGHT: 64 IN | SYSTOLIC BLOOD PRESSURE: 118 MMHG | BODY MASS INDEX: 28 KG/M2 | DIASTOLIC BLOOD PRESSURE: 76 MMHG

## 2019-08-05 DIAGNOSIS — E66.9 OBESITY (BMI 30.0-34.9): ICD-10-CM

## 2019-08-05 DIAGNOSIS — E53.8 LOW VITAMIN B12 LEVEL: ICD-10-CM

## 2019-08-05 DIAGNOSIS — E03.9 HYPOTHYROIDISM, UNSPECIFIED TYPE: ICD-10-CM

## 2019-08-05 DIAGNOSIS — Z51.81 ENCOUNTER FOR THERAPEUTIC DRUG MONITORING: Primary | ICD-10-CM

## 2019-08-05 PROCEDURE — 99214 OFFICE O/P EST MOD 30 MIN: CPT | Performed by: INTERNAL MEDICINE

## 2019-08-05 RX ORDER — PHENTERMINE HYDROCHLORIDE 37.5 MG/1
37.5 TABLET ORAL
Qty: 30 TABLET | Refills: 1 | Status: SHIPPED | OUTPATIENT
Start: 2019-08-05 | End: 2019-09-24

## 2019-08-05 RX ORDER — TOPIRAMATE 50 MG/1
50 TABLET, FILM COATED ORAL 2 TIMES DAILY
Qty: 180 TABLET | Refills: 1 | Status: SHIPPED | OUTPATIENT
Start: 2019-08-05 | End: 2019-10-30

## 2019-08-05 NOTE — PROGRESS NOTES
HISTORY OF PRESENT ILLNESS  Patient presents with:  Weight Check: down 7 lb      Riley Celestin is a 39year old female here for follow up in medical weight loss program.     Here for follow up   Down 7 lb   Good appetite suppression and satiety control.    Michael Freitas 4.0 04/27/2019    GLOBULT 2.5 01/17/2015    GLOBULIN 3.6 04/27/2019    ALBGLOBRAT 1.7 01/17/2015     04/27/2019    K 3.9 04/27/2019     (H) 04/27/2019    CO2 22.0 04/27/2019     Lab Results   Component Value Date    EAG 85 01/27/2018    A1C 4.6 Inhale 2 puffs into the lungs every 4 (four) hours as needed for Wheezing. Disp: 3 Inhaler Rfl: 1   Cyanocobalamin (VITAMIN B-12 OR) Take by mouth.  Disp:  Rfl:    MONTELUKAST SODIUM 10 MG Oral Tab TAKE 1 TABLET BY MOUTH EVERY NIGHT Disp: 90 tablet Rfl: 0 eat breakfast daily/ regular protein intake  · Medication use and side effects reviewed with patient. Medication contraindications: n/a   · Follow up with dietitian and psychologist as recommended.   · Discussed the role of sleep and stress in weight manag

## 2019-08-15 ENCOUNTER — OFFICE VISIT (OUTPATIENT)
Dept: FAMILY MEDICINE CLINIC | Facility: CLINIC | Age: 44
End: 2019-08-15

## 2019-08-15 VITALS
DIASTOLIC BLOOD PRESSURE: 70 MMHG | HEIGHT: 64 IN | TEMPERATURE: 98 F | WEIGHT: 164 LBS | SYSTOLIC BLOOD PRESSURE: 114 MMHG | HEART RATE: 100 BPM | BODY MASS INDEX: 28 KG/M2 | OXYGEN SATURATION: 96 %

## 2019-08-15 DIAGNOSIS — J01.01 ACUTE RECURRENT MAXILLARY SINUSITIS: Primary | ICD-10-CM

## 2019-08-15 PROCEDURE — 99214 OFFICE O/P EST MOD 30 MIN: CPT | Performed by: FAMILY MEDICINE

## 2019-08-15 RX ORDER — PREDNISONE 20 MG/1
60 TABLET ORAL DAILY
Qty: 21 TABLET | Refills: 0 | Status: SHIPPED | OUTPATIENT
Start: 2019-08-15 | End: 2019-08-22

## 2019-08-15 RX ORDER — DOXYCYCLINE HYCLATE 100 MG
100 TABLET ORAL 2 TIMES DAILY
Qty: 28 TABLET | Refills: 0 | Status: SHIPPED | OUTPATIENT
Start: 2019-08-15 | End: 2019-08-29

## 2019-08-15 NOTE — TELEPHONE ENCOUNTER
Requesting bupropion  LOV: 8/5/19  RTC: not noted  Last Relevant Labs: n/a  Filled: 6/19/19 #60 with 2 refills    Future Appointments   Date Time Provider Adela Small   9/24/2019  2:20 PM Donovan Miranda MD [facility]     Will run out before see

## 2019-08-15 NOTE — PROGRESS NOTES
HPI:   Riley Celestin is a 39year old female who presents for upper respiratory symptoms for  3  weeks.  Patient reports congestion, dry cough, cough is keeping pt up at night, sinus pain, wheezing, OTC cold meds have not been helping, prior history of bronc (VITAMIN B-12 OR) Take by mouth.  Disp:  Rfl:    MONTELUKAST SODIUM 10 MG Oral Tab TAKE 1 TABLET BY MOUTH EVERY NIGHT Disp: 90 tablet Rfl: 0   IPRATROPIUM BROMIDE 0.02 % Inhalation Solution USE 2.5 ML VIA NEBULIZER TWICE DAILY Disp: 62.5 mL Rfl: 0   Flutica 28.15 kg/m²   GENERAL: well developed, well nourished,in no apparent distress  SKIN: no rashes,no suspicious lesions  EYES:PERRL, EOMI,conjunctiva are clear  HEENT: atraumatic, normocephalic,ears and throat are clear; no maxillary and frontal sinus tendern

## 2019-08-19 RX ORDER — BUPROPION HYDROCHLORIDE 150 MG/1
TABLET, EXTENDED RELEASE ORAL
Qty: 60 TABLET | Refills: 0 | Status: SHIPPED | OUTPATIENT
Start: 2019-08-19 | End: 2019-09-24

## 2019-09-24 ENCOUNTER — OFFICE VISIT (OUTPATIENT)
Dept: INTERNAL MEDICINE CLINIC | Facility: CLINIC | Age: 44
End: 2019-09-24

## 2019-09-24 VITALS
HEART RATE: 77 BPM | OXYGEN SATURATION: 97 % | BODY MASS INDEX: 29.53 KG/M2 | HEIGHT: 64 IN | RESPIRATION RATE: 19 BRPM | DIASTOLIC BLOOD PRESSURE: 68 MMHG | WEIGHT: 173 LBS | SYSTOLIC BLOOD PRESSURE: 106 MMHG

## 2019-09-24 DIAGNOSIS — F43.9 STRESS AT HOME: ICD-10-CM

## 2019-09-24 DIAGNOSIS — E66.9 OBESITY (BMI 30.0-34.9): ICD-10-CM

## 2019-09-24 DIAGNOSIS — R63.5 ABNORMAL WEIGHT GAIN: ICD-10-CM

## 2019-09-24 DIAGNOSIS — Z51.81 ENCOUNTER FOR THERAPEUTIC DRUG MONITORING: Primary | ICD-10-CM

## 2019-09-24 DIAGNOSIS — Z15.89 HETEROZYGOUS MTHFR MUTATION C677T: ICD-10-CM

## 2019-09-24 PROCEDURE — 99214 OFFICE O/P EST MOD 30 MIN: CPT | Performed by: INTERNAL MEDICINE

## 2019-09-24 RX ORDER — PHENDIMETRAZINE TARTRATE 105 MG/1
1 CAPSULE, EXTENDED RELEASE ORAL EVERY MORNING
Qty: 30 CAPSULE | Refills: 1 | Status: SHIPPED | OUTPATIENT
Start: 2019-09-24 | End: 2020-01-22

## 2019-09-24 RX ORDER — PHENTERMINE HYDROCHLORIDE 37.5 MG/1
37.5 TABLET ORAL
Qty: 30 TABLET | Refills: 1 | Status: CANCELLED | OUTPATIENT
Start: 2019-09-24

## 2019-09-24 RX ORDER — BUPROPION HYDROCHLORIDE 150 MG/1
TABLET, EXTENDED RELEASE ORAL
Qty: 60 TABLET | Refills: 2 | Status: SHIPPED | OUTPATIENT
Start: 2019-09-24 | End: 2019-10-30

## 2019-09-24 RX ORDER — TOPIRAMATE 100 MG/1
100 TABLET, FILM COATED ORAL 2 TIMES DAILY
Qty: 60 TABLET | Refills: 2 | Status: SHIPPED | OUTPATIENT
Start: 2019-09-24 | End: 2019-10-24

## 2019-09-24 RX ORDER — TOPIRAMATE 50 MG/1
50 TABLET, FILM COATED ORAL 2 TIMES DAILY
Qty: 180 TABLET | Refills: 1 | Status: CANCELLED | OUTPATIENT
Start: 2019-09-24 | End: 2020-09-18

## 2019-09-24 NOTE — PROGRESS NOTES
HISTORY OF PRESENT ILLNESS  Patient presents with:  Weight Check: Gain 9 lbs      Rosanaelysia Surendrajhonathan is a 39year old female here for follow up in medical weight loss program.     Here for follow up   Gain 9 lb from previus  Does feel she needs to get back her r 04/27/2019    [address] 290 04/27/2019    ALKPHO 84 04/27/2019    AST 9 (L) 04/27/2019    ALT 16 04/27/2019    BILT 0.6 04/27/2019    TP 7.6 04/27/2019    ALB 4.0 04/27/2019    GLOBULT 2.5 01/17/2015    GLOBULIN 3.6 04/27/2019    ALBGLOBRAT 1.7 01/17/2015 Inhalation Aero Soln Inhale 2 puffs into the lungs every 4 (four) hours as needed for Wheezing. Disp: 3 Inhaler Rfl: 1   Cyanocobalamin (VITAMIN B-12 OR) Take by mouth.  Disp:  Rfl:    MONTELUKAST SODIUM 10 MG Oral Tab TAKE 1 TABLET BY MOUTH EVERY NIGHT Dis · We reviewed limitations of medication management without lifestyle adjustment   · Nutrition: low carb diet/ recommended to eat breakfast daily/ regular protein intake  · Medication use and side effects reviewed with patient.   Medication contraindicatio

## 2019-10-01 DIAGNOSIS — J30.2 SEASONAL ALLERGIES: ICD-10-CM

## 2019-10-03 RX ORDER — LEVOTHYROXINE SODIUM 0.15 MG/1
TABLET ORAL
Qty: 90 TABLET | Refills: 0 | Status: SHIPPED | OUTPATIENT
Start: 2019-10-03 | End: 2020-01-22

## 2019-10-03 RX ORDER — CETIRIZINE HYDROCHLORIDE 10 MG/1
TABLET ORAL
Qty: 90 TABLET | Refills: 0 | Status: SHIPPED | OUTPATIENT
Start: 2019-10-03 | End: 2019-12-26

## 2019-10-03 NOTE — TELEPHONE ENCOUNTER
Please let patient know to do her blood work. I still have her work employment form from back in May.

## 2019-10-10 RX ORDER — LEVOTHYROXINE SODIUM 0.15 MG/1
TABLET ORAL
Qty: 90 TABLET | Refills: 0 | OUTPATIENT
Start: 2019-10-10

## 2019-10-11 DIAGNOSIS — J45.40 MODERATE PERSISTENT EXTRINSIC ASTHMA WITHOUT COMPLICATION: ICD-10-CM

## 2019-10-11 RX ORDER — ALBUTEROL SULFATE 90 UG/1
2 AEROSOL, METERED RESPIRATORY (INHALATION) EVERY 4 HOURS PRN
Qty: 3 INHALER | Refills: 3 | Status: SHIPPED | OUTPATIENT
Start: 2019-10-11 | End: 2019-10-11

## 2019-10-11 RX ORDER — ALBUTEROL SULFATE 90 UG/1
2 AEROSOL, METERED RESPIRATORY (INHALATION) EVERY 4 HOURS PRN
Qty: 18 G | Refills: 3 | Status: SHIPPED | OUTPATIENT
Start: 2019-10-11 | End: 2019-12-13

## 2019-10-15 ENCOUNTER — TELEPHONE (OUTPATIENT)
Dept: FAMILY MEDICINE CLINIC | Facility: CLINIC | Age: 44
End: 2019-10-15

## 2019-10-15 NOTE — TELEPHONE ENCOUNTER
Called to pt to remind to do blood work for form in Dr [employer] bin. LM on vm that pt needs to do labs in next few days or the form will be shredded.

## 2019-10-30 ENCOUNTER — PATIENT MESSAGE (OUTPATIENT)
Dept: INTERNAL MEDICINE CLINIC | Facility: CLINIC | Age: 44
End: 2019-10-30

## 2019-10-30 DIAGNOSIS — Z51.81 ENCOUNTER FOR THERAPEUTIC DRUG MONITORING: ICD-10-CM

## 2019-10-30 DIAGNOSIS — E66.9 OBESITY (BMI 30.0-34.9): ICD-10-CM

## 2019-10-30 RX ORDER — BUPROPION HYDROCHLORIDE 150 MG/1
TABLET, EXTENDED RELEASE ORAL
Qty: 180 TABLET | Refills: 0 | Status: SHIPPED | OUTPATIENT
Start: 2019-10-30 | End: 2020-03-31

## 2019-10-30 RX ORDER — TOPIRAMATE 50 MG/1
50 TABLET, FILM COATED ORAL 2 TIMES DAILY
Qty: 180 TABLET | Refills: 0 | Status: SHIPPED | OUTPATIENT
Start: 2019-10-30 | End: 2019-11-08

## 2019-10-30 NOTE — TELEPHONE ENCOUNTER
Requesting Bupropion  LOV: 9/24/19  RTC: 4 weeks  Last Relevant Labs: na  Filled: 9/24/19 #60 with 2 refills    No future appointments. For insurance purposes patient needs a 3 month RX for better coverage.   RX sent

## 2019-10-30 NOTE — TELEPHONE ENCOUNTER
From: Urban Opitz  To: Mary King MD  Sent: 10/30/2019 7:09 AM CDT  Subject: Prescription Question    Hi would you be able to send one more refill for bupropion 150 2 times a day and topiramate 100mg 2 times a day.  I had a total of 3 refills on these and

## 2019-11-07 ENCOUNTER — OFFICE VISIT (OUTPATIENT)
Dept: FAMILY MEDICINE CLINIC | Facility: CLINIC | Age: 44
End: 2019-11-07

## 2019-11-07 VITALS
HEIGHT: 64 IN | HEART RATE: 100 BPM | WEIGHT: 162 LBS | DIASTOLIC BLOOD PRESSURE: 68 MMHG | BODY MASS INDEX: 27.66 KG/M2 | RESPIRATION RATE: 16 BRPM | OXYGEN SATURATION: 95 % | SYSTOLIC BLOOD PRESSURE: 110 MMHG | TEMPERATURE: 99 F

## 2019-11-07 DIAGNOSIS — J01.01 ACUTE RECURRENT MAXILLARY SINUSITIS: ICD-10-CM

## 2019-11-07 DIAGNOSIS — J45.41 MODERATE PERSISTENT ASTHMA WITH ACUTE EXACERBATION: Primary | ICD-10-CM

## 2019-11-07 PROCEDURE — 99214 OFFICE O/P EST MOD 30 MIN: CPT | Performed by: FAMILY MEDICINE

## 2019-11-07 RX ORDER — CEFDINIR 300 MG/1
300 CAPSULE ORAL 2 TIMES DAILY
Qty: 20 CAPSULE | Refills: 0 | Status: SHIPPED | OUTPATIENT
Start: 2019-11-07 | End: 2019-11-17

## 2019-11-07 RX ORDER — PREDNISONE 20 MG/1
60 TABLET ORAL DAILY
Qty: 21 TABLET | Refills: 0 | Status: SHIPPED | OUTPATIENT
Start: 2019-11-07 | End: 2019-11-14

## 2019-11-08 ENCOUNTER — PATIENT MESSAGE (OUTPATIENT)
Dept: INTERNAL MEDICINE CLINIC | Facility: CLINIC | Age: 44
End: 2019-11-08

## 2019-11-08 DIAGNOSIS — Z51.81 ENCOUNTER FOR THERAPEUTIC DRUG MONITORING: ICD-10-CM

## 2019-11-08 DIAGNOSIS — J45.41 MODERATE PERSISTENT ASTHMA WITH EXACERBATION: ICD-10-CM

## 2019-11-08 DIAGNOSIS — E66.9 OBESITY (BMI 30.0-34.9): ICD-10-CM

## 2019-11-08 RX ORDER — ALBUTEROL SULFATE 2.5 MG/3ML
2.5 SOLUTION RESPIRATORY (INHALATION) EVERY 4 HOURS PRN
Qty: 1 BOX | Refills: 1 | Status: SHIPPED | OUTPATIENT
Start: 2019-11-08 | End: 2020-03-20

## 2019-11-08 RX ORDER — TOPIRAMATE 50 MG/1
100 TABLET, FILM COATED ORAL 2 TIMES DAILY
Qty: 360 TABLET | Refills: 0 | OUTPATIENT
Start: 2019-11-08 | End: 2020-01-22

## 2019-11-08 NOTE — TELEPHONE ENCOUNTER
3 month of Bupropion was sent on 10/30/19  Topamax was sent wrong - it is in note to take 100 mg bid    buPROPion HCl ER, SR, 150 MG Oral Tablet 12 Hr 180 tablet 0 10/30/2019     Sig: TAKE 1 TABLET(150 MG) BY MOUTH TWICE DAILY    Sent to pharmacy as: Nelly Hurtado

## 2019-11-08 NOTE — TELEPHONE ENCOUNTER
From: Urban Opitz  To: Mary King MD  Sent: 11/8/2019 11:28 AM CST  Subject: Prescription Question    Hi I know I asked about the 90 day prescription before on the burproin I take and the pharmacy is giving me a hard time filing it because I have a 2 cholo

## 2019-11-08 NOTE — PROGRESS NOTES
HPI:   Deirdre Edgar is a 39year old female who presents for upper respiratory symptoms for 7  days.  Patient reports congestion, dry cough, cough is keeping pt up at night, sinus pain, wheezing, OTC cold meds have not been helping, prior history of sinusit • IPRATROPIUM BROMIDE 0.02 % Inhalation Solution USE 2.5 ML VIA NEBULIZER TWICE DAILY 62.5 mL 0   • Fluticasone Furoate-Vilanterol (BREO ELLIPTA) 200-25 MCG/INH Inhalation Aerosol Powder, Breath Activated Inhale 1 puff into the lungs daily.  3 each 3 suspicious lesions  EYES:PERRL, EOMI,conjunctiva are clear  HEENT: atraumatic, normocephalic,ears and throat are clear; no maxillary and frontal sinus tenderness  NECK: supple,no adenopathy  LUNGS: clear to auscultation, no r/r/w  CARDIO: RRR without murmu

## 2019-11-25 RX ORDER — TOPIRAMATE 100 MG/1
TABLET, FILM COATED ORAL
Qty: 180 TABLET | Refills: 0 | OUTPATIENT
Start: 2019-11-25

## 2019-11-25 NOTE — TELEPHONE ENCOUNTER
Requesting Topiramate  Filled: 11/8/19 #360 with 0 refills called to [facility]    Future Appointments   Date Time Provider Adela Small   1/22/2020  2:40 PM Salvador Davis MD [facility] [address]     Denied refill request as 3 month called to pharmacy and JOY valdez

## 2019-12-03 ENCOUNTER — TELEPHONE (OUTPATIENT)
Dept: FAMILY MEDICINE CLINIC | Facility: CLINIC | Age: 44
End: 2019-12-03

## 2019-12-04 ENCOUNTER — LAB ENCOUNTER (OUTPATIENT)
Dept: LAB | Age: 44
End: 2019-12-04
Attending: FAMILY MEDICINE
Payer: COMMERCIAL

## 2019-12-04 ENCOUNTER — OFFICE VISIT (OUTPATIENT)
Dept: FAMILY MEDICINE CLINIC | Facility: CLINIC | Age: 44
End: 2019-12-04

## 2019-12-04 VITALS
SYSTOLIC BLOOD PRESSURE: 110 MMHG | WEIGHT: 163 LBS | HEIGHT: 64 IN | HEART RATE: 91 BPM | BODY MASS INDEX: 27.83 KG/M2 | TEMPERATURE: 98 F | DIASTOLIC BLOOD PRESSURE: 70 MMHG | RESPIRATION RATE: 16 BRPM | OXYGEN SATURATION: 98 %

## 2019-12-04 DIAGNOSIS — Z00.00 ROUTINE GENERAL MEDICAL EXAMINATION AT A HEALTH CARE FACILITY: ICD-10-CM

## 2019-12-04 DIAGNOSIS — E03.9 ACQUIRED HYPOTHYROIDISM: ICD-10-CM

## 2019-12-04 DIAGNOSIS — J45.41 MODERATE PERSISTENT ASTHMA WITH ACUTE EXACERBATION: Primary | ICD-10-CM

## 2019-12-04 DIAGNOSIS — E03.9 HYPOTHYROIDISM, UNSPECIFIED TYPE: ICD-10-CM

## 2019-12-04 DIAGNOSIS — J01.01 ACUTE RECURRENT MAXILLARY SINUSITIS: ICD-10-CM

## 2019-12-04 PROCEDURE — 84443 ASSAY THYROID STIM HORMONE: CPT

## 2019-12-04 PROCEDURE — 80053 COMPREHEN METABOLIC PANEL: CPT

## 2019-12-04 PROCEDURE — 84439 ASSAY OF FREE THYROXINE: CPT

## 2019-12-04 PROCEDURE — 99214 OFFICE O/P EST MOD 30 MIN: CPT | Performed by: FAMILY MEDICINE

## 2019-12-04 PROCEDURE — 36415 COLL VENOUS BLD VENIPUNCTURE: CPT

## 2019-12-04 PROCEDURE — 85025 COMPLETE CBC W/AUTO DIFF WBC: CPT

## 2019-12-04 PROCEDURE — 80061 LIPID PANEL: CPT

## 2019-12-04 RX ORDER — PREDNISONE 20 MG/1
60 TABLET ORAL DAILY
Qty: 21 TABLET | Refills: 0 | Status: SHIPPED | OUTPATIENT
Start: 2019-12-04 | End: 2019-12-11

## 2019-12-04 RX ORDER — DOXYCYCLINE HYCLATE 100 MG
100 TABLET ORAL 2 TIMES DAILY
Qty: 20 TABLET | Refills: 0 | Status: SHIPPED | OUTPATIENT
Start: 2019-12-04 | End: 2019-12-14

## 2019-12-04 NOTE — PROGRESS NOTES
HPI:   Riley Celestin is a 39year old female who presents for upper respiratory symptoms for 7  days.  Patient reports congestion, dry cough, cough is keeping pt up at night, sinus pain, wheezing, OTC cold meds have not been helping, prior history of sinusit 2. 5 MCG/ACT Inhalation Aero Soln Inhale 2.5 mcg into the lungs daily. 3 Inhaler 0   • triamcinolone acetonide 0.1 % External Cream Apply topically 2 (two) times daily as needed.  60 g 3   • Albuterol Sulfate  (90 Base) MCG/ACT Inhalation Aero Soln In kg)   SpO2 98%   BMI 27.98 kg/m²   GENERAL: well developed, well nourished,in no apparent distress  SKIN: no rashes,no suspicious lesions  EYES:PERRL, EOMI,conjunctiva are clear  HEENT: atraumatic, normocephalic,ears and throat are clear; no maxillary and

## 2019-12-05 RX ORDER — LEVOTHYROXINE SODIUM 137 UG/1
137 TABLET ORAL
Qty: 90 TABLET | Refills: 0 | Status: SHIPPED | OUTPATIENT
Start: 2019-12-05 | End: 2020-05-21

## 2019-12-10 ENCOUNTER — MED REC SCAN ONLY (OUTPATIENT)
Dept: FAMILY MEDICINE CLINIC | Facility: CLINIC | Age: 44
End: 2019-12-10

## 2019-12-13 DIAGNOSIS — J45.40 MODERATE PERSISTENT EXTRINSIC ASTHMA WITHOUT COMPLICATION: ICD-10-CM

## 2019-12-13 RX ORDER — FLUTICASONE PROPIONATE 50 MCG
2 SPRAY, SUSPENSION (ML) NASAL DAILY
Qty: 1 BOTTLE | Refills: 1 | Status: SHIPPED | OUTPATIENT
Start: 2019-12-13 | End: 2020-12-07

## 2019-12-13 RX ORDER — ALBUTEROL SULFATE 90 UG/1
2 AEROSOL, METERED RESPIRATORY (INHALATION) EVERY 4 HOURS PRN
Qty: 18 G | Refills: 3 | Status: SHIPPED | OUTPATIENT
Start: 2019-12-13 | End: 2020-03-20

## 2019-12-17 RX ORDER — FLUTICASONE PROPIONATE 50 MCG
SPRAY, SUSPENSION (ML) NASAL
Qty: 48 G | Refills: 0 | OUTPATIENT
Start: 2019-12-17

## 2019-12-26 DIAGNOSIS — J30.2 SEASONAL ALLERGIES: ICD-10-CM

## 2019-12-26 RX ORDER — CETIRIZINE HYDROCHLORIDE 10 MG/1
TABLET ORAL
Qty: 90 TABLET | Refills: 0 | Status: SHIPPED | OUTPATIENT
Start: 2019-12-26 | End: 2020-03-18

## 2019-12-30 RX ORDER — TOPIRAMATE 100 MG/1
TABLET, FILM COATED ORAL
Qty: 180 TABLET | Refills: 0 | OUTPATIENT
Start: 2019-12-30

## 2019-12-30 NOTE — TELEPHONE ENCOUNTER
Requesting Topiramate  LOV: 8/5/19  RTC: not noted  Last Relevant Labs: na  Filled: 11/8/19 #360 with 0 refills    Future Appointments   Date Time Provider Adela Small   1/22/2020  2:40 PM Victor Hugo Sanderson MD [facility] [address]     This was called to Rut

## 2020-01-22 ENCOUNTER — OFFICE VISIT (OUTPATIENT)
Dept: INTERNAL MEDICINE CLINIC | Facility: CLINIC | Age: 45
End: 2020-01-22

## 2020-01-22 VITALS
WEIGHT: 164 LBS | RESPIRATION RATE: 16 BRPM | HEIGHT: 64 IN | BODY MASS INDEX: 28 KG/M2 | SYSTOLIC BLOOD PRESSURE: 122 MMHG | HEART RATE: 84 BPM | DIASTOLIC BLOOD PRESSURE: 78 MMHG

## 2020-01-22 DIAGNOSIS — E66.9 OBESITY (BMI 30.0-34.9): ICD-10-CM

## 2020-01-22 DIAGNOSIS — Z51.81 THERAPEUTIC DRUG MONITORING: Primary | ICD-10-CM

## 2020-01-22 PROCEDURE — 99214 OFFICE O/P EST MOD 30 MIN: CPT | Performed by: INTERNAL MEDICINE

## 2020-01-22 RX ORDER — PHENTERMINE HYDROCHLORIDE 37.5 MG/1
37.5 TABLET ORAL
Qty: 30 TABLET | Refills: 1 | Status: SHIPPED | OUTPATIENT
Start: 2020-01-22 | End: 2020-03-31

## 2020-01-22 RX ORDER — TOPIRAMATE 100 MG/1
TABLET, FILM COATED ORAL
COMMUNITY
Start: 2019-12-30 | End: 2020-05-27

## 2020-01-22 NOTE — PROGRESS NOTES
HISTORY OF PRESENT ILLNESS  Patient presents with:  Weight Check: down 9 lb      Yamielx Feliz is a 38year old female here for follow up in medical weight loss program.     Here for follow up   Down 9 lb   Did feel she was losing better with the phendimetr 12/04/2019    TP 7.3 12/04/2019    ALB 3.7 12/04/2019    GLOBULT 2.5 01/17/2015    GLOBULIN 3.6 12/04/2019    ALBGLOBRAT 1.7 01/17/2015     12/04/2019    K 3.2 (L) 12/04/2019     12/04/2019    CO2 26.0 12/04/2019     Lab Results   Component Kendal MOUTH TWICE DAILY, Disp: 180 tablet, Rfl: 0  triamcinolone acetonide 0.1 % External Cream, Apply topically 2 (two) times daily as needed. , Disp: 60 g, Rfl: 3  Albuterol Sulfate  (90 Base) MCG/ACT Inhalation Aero Soln, Inhale 2 puffs into the lungs e follow up with Tyrell Andino  · Continue b complex   · We reviewed limitations of medication management without lifestyle adjustment   · Nutrition: low carb diet/ recommended to eat breakfast daily/ regular protein intake  · Medication use and side effects reviewed

## 2020-03-18 DIAGNOSIS — J30.2 SEASONAL ALLERGIES: ICD-10-CM

## 2020-03-18 RX ORDER — CETIRIZINE HYDROCHLORIDE 10 MG/1
TABLET ORAL
Qty: 90 TABLET | Refills: 0 | Status: SHIPPED | OUTPATIENT
Start: 2020-03-18 | End: 2020-05-20

## 2020-03-18 NOTE — TELEPHONE ENCOUNTER
Last OV : 12/4/2019 asthma   Upcoming appt/reason:    Future Appointments   Date Time Provider Adela Small   4/1/2020  4:00 PM Janae Stapleton MD [facility]     CETIRIZINE 10 MG Oral Tab 90 tablet 0 12/26/2019     Last labs: 12/4/2019    When pt

## 2020-03-20 ENCOUNTER — TELEPHONE (OUTPATIENT)
Dept: FAMILY MEDICINE CLINIC | Facility: CLINIC | Age: 45
End: 2020-03-20

## 2020-03-20 DIAGNOSIS — J45.41 MODERATE PERSISTENT ASTHMA WITH EXACERBATION: ICD-10-CM

## 2020-03-20 DIAGNOSIS — J01.01 ACUTE RECURRENT MAXILLARY SINUSITIS: Primary | ICD-10-CM

## 2020-03-20 PROCEDURE — 99441 PHONE E/M BY PHYS 5-10 MIN: CPT | Performed by: FAMILY MEDICINE

## 2020-03-20 RX ORDER — ALBUTEROL SULFATE 2.5 MG/3ML
2.5 SOLUTION RESPIRATORY (INHALATION) EVERY 4 HOURS PRN
Qty: 1 BOX | Refills: 1 | Status: SHIPPED | OUTPATIENT
Start: 2020-03-20 | End: 2020-10-12

## 2020-03-20 RX ORDER — PREDNISONE 20 MG/1
60 TABLET ORAL DAILY
Qty: 21 TABLET | Refills: 0 | Status: SHIPPED | OUTPATIENT
Start: 2020-03-20 | End: 2020-03-27

## 2020-03-20 RX ORDER — DOXYCYCLINE HYCLATE 100 MG
100 TABLET ORAL 2 TIMES DAILY
Qty: 20 TABLET | Refills: 0 | Status: SHIPPED | OUTPATIENT
Start: 2020-03-20 | End: 2020-03-30

## 2020-03-20 RX ORDER — ALBUTEROL SULFATE 90 UG/1
2 AEROSOL, METERED RESPIRATORY (INHALATION) EVERY 4 HOURS PRN
Qty: 3 INHALER | Refills: 0 | Status: SHIPPED | OUTPATIENT
Start: 2020-03-20 | End: 2020-08-06

## 2020-03-20 NOTE — TELEPHONE ENCOUNTER
Virtual/Telephone Check-In    Del  verbally consents to a Virtual/Telephone Check-In service on 03/20/20. Patient understands and accepts financial responsibility for any deductible, co-insurance and/or co-pays associated with this service.     Dur

## 2020-03-27 ENCOUNTER — PATIENT MESSAGE (OUTPATIENT)
Dept: INTERNAL MEDICINE CLINIC | Facility: CLINIC | Age: 45
End: 2020-03-27

## 2020-03-30 NOTE — TELEPHONE ENCOUNTER
From: Reuben Armas  To: Jacky Calero  Sent: 3/27/2020 1:45 PM CDT  Subject: weight loss clinic    The safety and protection of patients, staff, physicians and community is an absolute priority for [facility].  Therefore we are cancelling all in pers

## 2020-03-31 ENCOUNTER — TELEPHONE (OUTPATIENT)
Dept: INTERNAL MEDICINE CLINIC | Facility: CLINIC | Age: 45
End: 2020-03-31

## 2020-03-31 ENCOUNTER — VIRTUAL PHONE E/M (OUTPATIENT)
Dept: INTERNAL MEDICINE CLINIC | Facility: CLINIC | Age: 45
End: 2020-03-31

## 2020-03-31 DIAGNOSIS — Z51.81 THERAPEUTIC DRUG MONITORING: Primary | ICD-10-CM

## 2020-03-31 DIAGNOSIS — Z02.9 ADMINISTRATIVE ENCOUNTER: Primary | ICD-10-CM

## 2020-03-31 DIAGNOSIS — E66.9 OBESITY (BMI 30.0-34.9): ICD-10-CM

## 2020-03-31 PROCEDURE — 99213 OFFICE O/P EST LOW 20 MIN: CPT | Performed by: INTERNAL MEDICINE

## 2020-03-31 PROCEDURE — 99212 OFFICE O/P EST SF 10 MIN: CPT | Performed by: INTERNAL MEDICINE

## 2020-03-31 RX ORDER — PHENTERMINE HYDROCHLORIDE 37.5 MG/1
37.5 TABLET ORAL
Qty: 30 TABLET | Refills: 1 | Status: SHIPPED | OUTPATIENT
Start: 2020-03-31 | End: 2020-08-06 | Stop reason: DRUGHIGH

## 2020-03-31 RX ORDER — BUPROPION HYDROCHLORIDE 150 MG/1
TABLET, EXTENDED RELEASE ORAL
Qty: 180 TABLET | Refills: 0 | Status: SHIPPED | OUTPATIENT
Start: 2020-03-31 | End: 2020-09-24

## 2020-03-31 NOTE — TELEPHONE ENCOUNTER
[facility] check in/follow up encounter  Virtual/Telephone Check-In    Artiemonica Feliz verbally consents to a Virtual/Telephone Check-In service on 03/30/20.   Patient understands and accepts financial responsibility for any deductible,

## 2020-05-20 DIAGNOSIS — J30.2 SEASONAL ALLERGIES: ICD-10-CM

## 2020-05-20 RX ORDER — CETIRIZINE HYDROCHLORIDE 10 MG/1
TABLET ORAL
Qty: 90 TABLET | Refills: 0 | Status: SHIPPED | OUTPATIENT
Start: 2020-05-20 | End: 2020-08-07

## 2020-05-20 NOTE — TELEPHONE ENCOUNTER
Requesting Topiramate  LOV: 3/31/20  RTC: 8 weeks  Last Relevant Labs: na  Filled: 9/24/19 #60 with 2 refills    No future appointments.

## 2020-05-21 RX ORDER — LEVOTHYROXINE SODIUM 137 UG/1
137 TABLET ORAL
Qty: 90 TABLET | Refills: 0 | Status: SHIPPED | OUTPATIENT
Start: 2020-05-21 | End: 2020-09-03

## 2020-05-21 NOTE — TELEPHONE ENCOUNTER
Received request for a refill of pt's Levothyroxine 137mcg tab. Last OV and lab work 12/4/19, last refill 12/5/19 #90.

## 2020-05-27 RX ORDER — TOPIRAMATE 100 MG/1
TABLET, FILM COATED ORAL
Qty: 180 TABLET | Refills: 0 | OUTPATIENT
Start: 2020-05-27

## 2020-05-27 RX ORDER — TOPIRAMATE 100 MG/1
TABLET, FILM COATED ORAL
Qty: 60 TABLET | Refills: 0 | Status: SHIPPED | OUTPATIENT
Start: 2020-05-27 | End: 2020-06-16

## 2020-06-03 NOTE — TELEPHONE ENCOUNTER
I called patient and left message to verify her appointment since she has not read my chart with verification. I gave the day and time and asked her to confirm.     Future Appointments   Date Time Provider Adela Small   6/16/2020  4:40 PM Laura Mendez,

## 2020-06-10 ENCOUNTER — VIRTUAL PHONE E/M (OUTPATIENT)
Dept: FAMILY MEDICINE CLINIC | Facility: CLINIC | Age: 45
End: 2020-06-10

## 2020-06-10 ENCOUNTER — PATIENT MESSAGE (OUTPATIENT)
Dept: FAMILY MEDICINE CLINIC | Facility: CLINIC | Age: 45
End: 2020-06-10

## 2020-06-10 DIAGNOSIS — J01.01 ACUTE RECURRENT MAXILLARY SINUSITIS: ICD-10-CM

## 2020-06-10 DIAGNOSIS — J45.41 MODERATE PERSISTENT ASTHMA WITH EXACERBATION: Primary | ICD-10-CM

## 2020-06-10 PROCEDURE — 99214 OFFICE O/P EST MOD 30 MIN: CPT | Performed by: FAMILY MEDICINE

## 2020-06-10 RX ORDER — MONTELUKAST SODIUM 10 MG/1
10 TABLET ORAL NIGHTLY
Qty: 90 TABLET | Refills: 1 | Status: SHIPPED | OUTPATIENT
Start: 2020-06-10 | End: 2021-07-22

## 2020-06-10 RX ORDER — PREDNISONE 20 MG/1
60 TABLET ORAL DAILY
Qty: 21 TABLET | Refills: 0 | Status: SHIPPED | OUTPATIENT
Start: 2020-06-10 | End: 2020-06-17

## 2020-06-10 RX ORDER — DOXYCYCLINE HYCLATE 100 MG
100 TABLET ORAL 2 TIMES DAILY
Qty: 20 TABLET | Refills: 0 | Status: SHIPPED | OUTPATIENT
Start: 2020-06-10 | End: 2020-06-20

## 2020-06-10 NOTE — PROGRESS NOTES
Virtual/Telephone Check-In    Macho Swift verbally consents to a Virtual/Telephone Check-In service on 06/10/20. Patient understands and accepts financial responsibility for any deductible, co-insurance and/or co-pays associated with this service.  This vi tablet 0   • Albuterol Sulfate HFA (VENTOLIN HFA) 108 (90 Base) MCG/ACT Inhalation Aero Soln Inhale 2 puffs into the lungs every 4 (four) hours as needed for Wheezing.  3 Inhaler 0   • albuterol sulfate (2.5 MG/3ML) 0.083% Inhalation Nebu Soln Take 3 mL (2. breathing      Diagnosis:  1. Moderate persistent asthma with exacerbation  - predniSONE 20 MG Oral Tab; Take 3 tablets (60 mg total) by mouth daily for 7 days. Dispense: 21 tablet;  Refill: 0  -  Recurrent  -  Start prednisone 60mg daily x 7 days  -  Cont

## 2020-06-10 NOTE — TELEPHONE ENCOUNTER
From: Jake Ivan  To: KIRA DURAN DO  Sent: 6/10/2020 9:44 AM CDT  Subject: Prescription Question    I'm sorry I forgot to ask you this morning about refills for my singular.  I can't auto refill from [facility] because it doesn't show up on my lis

## 2020-06-16 ENCOUNTER — VIRTUAL PHONE E/M (OUTPATIENT)
Dept: INTERNAL MEDICINE CLINIC | Facility: CLINIC | Age: 45
End: 2020-06-16

## 2020-06-16 DIAGNOSIS — E66.9 OBESITY (BMI 30.0-34.9): ICD-10-CM

## 2020-06-16 DIAGNOSIS — Z51.81 THERAPEUTIC DRUG MONITORING: Primary | ICD-10-CM

## 2020-06-16 PROCEDURE — 99213 OFFICE O/P EST LOW 20 MIN: CPT | Performed by: PHYSICIAN ASSISTANT

## 2020-06-16 RX ORDER — TOPIRAMATE 100 MG/1
100 TABLET, FILM COATED ORAL 2 TIMES DAILY
Qty: 180 TABLET | Refills: 0 | Status: SHIPPED | OUTPATIENT
Start: 2020-06-16 | End: 2020-09-24

## 2020-06-16 RX ORDER — PHENTERMINE HYDROCHLORIDE 15 MG/1
15 CAPSULE ORAL 2 TIMES DAILY
Qty: 60 CAPSULE | Refills: 0 | Status: SHIPPED | OUTPATIENT
Start: 2020-06-16 | End: 2020-09-24

## 2020-06-16 NOTE — PATIENT INSTRUCTIONS
We are here to support you with weight loss, but please remember that you still need your primary care provider for your routine health maintenance. PLAN:  Follow-up in 1 month     Please try to work on the following dietary changes:  1.  Goals: Aim fo 2. \"7 minute workout\" to help with exercise/activity which takes 7 minutes of your day and that you can do at home! 3. \"Calm\" or \"Headspace\" which helps with mindfulness, meditation, clarity, sleep, and bethany to your daily life.    4. [employer] blog

## 2020-06-16 NOTE — PROGRESS NOTES
Virtual Telephone Check-In    Shamar Garcia verbally consents to a Virtual/Telephone Check-In visit on 6/16/2020. Patient understands and accepts financial responsibility for any deductible, co-insurance and/or co-pays associated with this service.     Du changes  Psych: denies any mood changes     Physical Exam:  Appropriate affect and mood, normal logic and thought process   Patient speaking in full sentences, no increased work of breathing    Assessment/Plan:   Diagnoses and all orders for this visit: Demetrius Shah PA-C

## 2020-07-09 ENCOUNTER — TELEPHONE (OUTPATIENT)
Dept: CASE MANAGEMENT | Age: 45
End: 2020-07-09

## 2020-07-13 NOTE — TELEPHONE ENCOUNTER
Insurance prefers Advair Diskus inhaler over Nagy American. Spoke with patient stating she believes she was on the Advair at one time and currently she is doing well on the [place]. She is NOT interested in changing inhalers at this time.

## 2020-08-06 ENCOUNTER — OFFICE VISIT (OUTPATIENT)
Dept: FAMILY MEDICINE CLINIC | Facility: CLINIC | Age: 45
End: 2020-08-06

## 2020-08-06 VITALS
DIASTOLIC BLOOD PRESSURE: 70 MMHG | TEMPERATURE: 98 F | HEIGHT: 64 IN | SYSTOLIC BLOOD PRESSURE: 114 MMHG | RESPIRATION RATE: 16 BRPM | HEART RATE: 80 BPM | BODY MASS INDEX: 29.71 KG/M2 | WEIGHT: 174 LBS

## 2020-08-06 DIAGNOSIS — J30.2 SEASONAL ALLERGIES: ICD-10-CM

## 2020-08-06 DIAGNOSIS — J01.01 ACUTE RECURRENT MAXILLARY SINUSITIS: ICD-10-CM

## 2020-08-06 DIAGNOSIS — J45.41 MODERATE PERSISTENT ASTHMA WITH EXACERBATION: Primary | ICD-10-CM

## 2020-08-06 PROCEDURE — 3074F SYST BP LT 130 MM HG: CPT | Performed by: FAMILY MEDICINE

## 2020-08-06 PROCEDURE — 3078F DIAST BP <80 MM HG: CPT | Performed by: FAMILY MEDICINE

## 2020-08-06 PROCEDURE — 99214 OFFICE O/P EST MOD 30 MIN: CPT | Performed by: FAMILY MEDICINE

## 2020-08-06 PROCEDURE — 3008F BODY MASS INDEX DOCD: CPT | Performed by: FAMILY MEDICINE

## 2020-08-06 RX ORDER — SULFAMETHOXAZOLE AND TRIMETHOPRIM 800; 160 MG/1; MG/1
1 TABLET ORAL 2 TIMES DAILY
Qty: 20 TABLET | Refills: 0 | Status: SHIPPED | OUTPATIENT
Start: 2020-08-06 | End: 2020-08-16

## 2020-08-06 RX ORDER — ALBUTEROL SULFATE 90 UG/1
2 AEROSOL, METERED RESPIRATORY (INHALATION) EVERY 4 HOURS PRN
Qty: 3 INHALER | Refills: 0 | Status: SHIPPED | OUTPATIENT
Start: 2020-08-06 | End: 2020-09-03

## 2020-08-06 RX ORDER — PREDNISONE 20 MG/1
60 TABLET ORAL DAILY
Qty: 21 TABLET | Refills: 0 | Status: SHIPPED | OUTPATIENT
Start: 2020-08-06 | End: 2020-08-13

## 2020-08-07 RX ORDER — CETIRIZINE HYDROCHLORIDE 10 MG/1
TABLET ORAL
Qty: 90 TABLET | Refills: 1 | Status: SHIPPED | OUTPATIENT
Start: 2020-08-07 | End: 2021-02-08

## 2020-08-07 NOTE — PROGRESS NOTES
[facility] Family Medicine Office Note  Chief Complaint:   Patient presents with:  Asthma      HPI:   This is a 38year old female coming in for follow up of asthma, recurrent sinusitis and allergies.     1.  Asthma - The patient presents for rech Maternal Grandmother    • Diabetes Maternal Grandfather    • Heart Disorder Maternal Grandfather    • Cancer Maternal Grandfather         unknown cancer   • Suicide History Father    • No Known Problems Sister      Allergies:    Aspirin                 Tig Propionate 50 MCG/ACT Nasal Suspension 2 sprays by Each Nare route daily.  1 Bottle 1   • Ipratropium Bromide 0.02 % Inhalation Solution USE 2.5 ML VIA NEBULIZER TWICE DAILY 62.5 mL 0   • triamcinolone acetonide 0.1 % External Cream Apply topically 2 (two) abnormalities noted.   LUNGS: clear to auscultation bilaterally, no rales/rhonchi , + wheezing  HEART:  Regular rate and rhythm, no murmurs, rubs or gallops  ABDOMEN:  Soft, nondistended, nontender, bowel sounds normal in all 4 quadrants, no hepatosplenomeg 800-160 MG Oral Tab per tablet 20 tablet 0     Sig: Take 1 tablet by mouth 2 (two) times daily for 10 days. • Tiotropium Bromide Monohydrate (SPIRIVA RESPIMAT) 2.5 MCG/ACT Inhalation Aero Soln 3 Inhaler 0     Sig: Inhale 2.5 mcg into the lungs daily.    • asthma     ASCUS with positive high risk HPV cervical ( positive 18/45 genotype 04/03/2019 )      KIRA DURAN, DO    Please note that portions of this note may have been completed with a voice recognition program. Efforts were made to edit the dicta

## 2020-08-26 DIAGNOSIS — J30.2 SEASONAL ALLERGIES: ICD-10-CM

## 2020-08-26 DIAGNOSIS — J45.41 MODERATE PERSISTENT ASTHMA WITH EXACERBATION: ICD-10-CM

## 2020-08-26 RX ORDER — CETIRIZINE HYDROCHLORIDE 10 MG/1
TABLET ORAL
Qty: 90 TABLET | Refills: 1 | Status: CANCELLED | OUTPATIENT
Start: 2020-08-26

## 2020-08-26 RX ORDER — ALBUTEROL SULFATE 90 UG/1
2 AEROSOL, METERED RESPIRATORY (INHALATION) EVERY 4 HOURS PRN
Qty: 3 INHALER | Refills: 0 | Status: CANCELLED | OUTPATIENT
Start: 2020-08-26

## 2020-08-26 RX ORDER — MONTELUKAST SODIUM 10 MG/1
10 TABLET ORAL NIGHTLY
Qty: 90 TABLET | Refills: 1 | Status: CANCELLED | OUTPATIENT
Start: 2020-08-26

## 2020-08-26 NOTE — TELEPHONE ENCOUNTER
Requesting Phentermine  LOV: 6/16/20  RTC: one month  Last Relevant Labs: na  Filled: 6/16/20 #60 with 0 refills  Last [place] 7[place]20 [place]    Future Appointments   Date Time Provider Adela Small   1/6/2021  4:00 PM Zakia Rangel MD [facility]

## 2020-08-26 NOTE — TELEPHONE ENCOUNTER
Future Appointments   Date Time Provider Adela Small   9/24/2020  3:00 PM Keisha Wright MD [facility]   1/6/2021  4:00 PM Keisha Wright MD [facility]

## 2020-08-27 RX ORDER — PHENTERMINE HYDROCHLORIDE 15 MG/1
15 CAPSULE ORAL 2 TIMES DAILY
Qty: 60 CAPSULE | Refills: 0 | OUTPATIENT
Start: 2020-08-27

## 2020-08-28 ENCOUNTER — PATIENT MESSAGE (OUTPATIENT)
Dept: FAMILY MEDICINE CLINIC | Facility: CLINIC | Age: 45
End: 2020-08-28

## 2020-08-28 NOTE — TELEPHONE ENCOUNTER
From: Carlton Cortez  To: [facility],   Sent: 8/28/2020 7:49 AM CDT  Subject: Visit Follow-up Question    Hi I sent a message the other day regarding a change on the rx that I had on my last visit for my sinus infection/asthma.  We did the sulfameth

## 2020-08-31 ENCOUNTER — LAB ENCOUNTER (OUTPATIENT)
Dept: LAB | Facility: HOSPITAL | Age: 45
End: 2020-08-31
Attending: FAMILY MEDICINE
Payer: COMMERCIAL

## 2020-08-31 DIAGNOSIS — E87.6 HYPOKALEMIA: ICD-10-CM

## 2020-08-31 DIAGNOSIS — E03.9 HYPOTHYROIDISM, UNSPECIFIED TYPE: ICD-10-CM

## 2020-08-31 DIAGNOSIS — Z51.81 THERAPEUTIC DRUG MONITORING: ICD-10-CM

## 2020-08-31 DIAGNOSIS — E66.9 OBESITY (BMI 30.0-34.9): ICD-10-CM

## 2020-08-31 LAB
ALBUMIN SERPL-MCNC: 3.5 G/DL (ref 3.4–5)
ALBUMIN/GLOB SERPL: 1 {RATIO} (ref 1–2)
ALP LIVER SERPL-CCNC: 81 U/L (ref 37–98)
ALT SERPL-CCNC: 18 U/L (ref 13–56)
ANION GAP SERPL CALC-SCNC: 5 MMOL/L (ref 0–18)
AST SERPL-CCNC: 9 U/L (ref 15–37)
BILIRUB SERPL-MCNC: 0.3 MG/DL (ref 0.1–2)
BUN BLD-MCNC: 9 MG/DL (ref 7–18)
BUN/CREAT SERPL: 12.9 (ref 10–20)
CALCIUM BLD-MCNC: 8.9 MG/DL (ref 8.5–10.1)
CHLORIDE SERPL-SCNC: 108 MMOL/L (ref 98–112)
CO2 SERPL-SCNC: 25 MMOL/L (ref 21–32)
CREAT BLD-MCNC: 0.7 MG/DL (ref 0.55–1.02)
EST. AVERAGE GLUCOSE BLD GHB EST-MCNC: 85 MG/DL (ref 68–126)
GLOBULIN PLAS-MCNC: 3.6 G/DL (ref 2.8–4.4)
GLUCOSE BLD-MCNC: 77 MG/DL (ref 70–99)
HBA1C MFR BLD HPLC: 4.6 % (ref ?–5.7)
M PROTEIN MFR SERPL ELPH: 7.1 G/DL (ref 6.4–8.2)
OSMOLALITY SERPL CALC.SUM OF ELEC: 283 MOSM/KG (ref 275–295)
PATIENT FASTING Y/N/NP: YES
POTASSIUM SERPL-SCNC: 3.6 MMOL/L (ref 3.5–5.1)
SODIUM SERPL-SCNC: 138 MMOL/L (ref 136–145)
T4 FREE SERPL-MCNC: 1 NG/DL (ref 0.8–1.7)
TSI SER-ACNC: 1.1 MIU/ML (ref 0.36–3.74)

## 2020-08-31 PROCEDURE — 36415 COLL VENOUS BLD VENIPUNCTURE: CPT

## 2020-08-31 PROCEDURE — 84443 ASSAY THYROID STIM HORMONE: CPT

## 2020-08-31 PROCEDURE — 80053 COMPREHEN METABOLIC PANEL: CPT

## 2020-08-31 PROCEDURE — 84439 ASSAY OF FREE THYROXINE: CPT

## 2020-08-31 PROCEDURE — 83036 HEMOGLOBIN GLYCOSYLATED A1C: CPT

## 2020-09-02 DIAGNOSIS — J45.41 MODERATE PERSISTENT ASTHMA WITH EXACERBATION: ICD-10-CM

## 2020-09-03 DIAGNOSIS — J45.41 MODERATE PERSISTENT ASTHMA WITH EXACERBATION: ICD-10-CM

## 2020-09-03 RX ORDER — ALBUTEROL SULFATE 90 UG/1
2 AEROSOL, METERED RESPIRATORY (INHALATION) EVERY 4 HOURS PRN
Qty: 3 INHALER | Refills: 0 | Status: SHIPPED | OUTPATIENT
Start: 2020-09-03 | End: 2020-09-04

## 2020-09-03 RX ORDER — LEVOTHYROXINE SODIUM 137 UG/1
137 TABLET ORAL
Qty: 90 TABLET | Refills: 1 | Status: SHIPPED | OUTPATIENT
Start: 2020-09-03 | End: 2021-01-25 | Stop reason: DRUGHIGH

## 2020-09-04 RX ORDER — PHENTERMINE HYDROCHLORIDE 15 MG/1
15 CAPSULE ORAL 2 TIMES DAILY
Qty: 60 CAPSULE | Refills: 0 | Status: CANCELLED | OUTPATIENT
Start: 2020-09-04

## 2020-09-04 RX ORDER — ALBUTEROL SULFATE 90 UG/1
AEROSOL, METERED RESPIRATORY (INHALATION)
Qty: 42.5 G | Refills: 1 | Status: SHIPPED | OUTPATIENT
Start: 2020-09-04 | End: 2020-10-12

## 2020-09-04 NOTE — TELEPHONE ENCOUNTER
Requesting Phentermine 15mg  LOV: 6/16/20  RTC: one month  Last Relevant Labs: na  Filled: 6/16/20 #60 with 0 refills last filled 7/7/20 [place]    Future Appointments   Date Time Provider Adela Small   9/24/2020  3:00 PM Victor Hugo Sanderson

## 2020-09-08 RX ORDER — PHENTERMINE HYDROCHLORIDE 15 MG/1
CAPSULE ORAL
Qty: 60 CAPSULE | Refills: 0 | OUTPATIENT
Start: 2020-09-08

## 2020-09-11 RX ORDER — ALBUTEROL SULFATE 90 UG/1
AEROSOL, METERED RESPIRATORY (INHALATION)
Qty: 54 G | Refills: 0 | OUTPATIENT
Start: 2020-09-11

## 2020-09-16 RX ORDER — METFORMIN HYDROCHLORIDE 750 MG/1
TABLET, EXTENDED RELEASE ORAL
Qty: 60 TABLET | Refills: 5 | OUTPATIENT
Start: 2020-09-16

## 2020-09-16 NOTE — TELEPHONE ENCOUNTER
Requesting Metformin  mg  LOV: 6/16/20  RTC: one month  Last Relevant Labs: 8/31/20  Filled: 6/19/19 #60 with 5 refills    Future Appointments   Date Time Provider Adela Small   9/24/2020  3:00 PM Rhiannon Hirsch MD [facility]   1/6/2021

## 2020-09-24 ENCOUNTER — OFFICE VISIT (OUTPATIENT)
Dept: INTERNAL MEDICINE CLINIC | Facility: CLINIC | Age: 45
End: 2020-09-24

## 2020-09-24 VITALS
RESPIRATION RATE: 16 BRPM | HEART RATE: 78 BPM | SYSTOLIC BLOOD PRESSURE: 118 MMHG | TEMPERATURE: 97 F | HEIGHT: 64 IN | WEIGHT: 175 LBS | DIASTOLIC BLOOD PRESSURE: 78 MMHG | BODY MASS INDEX: 29.88 KG/M2

## 2020-09-24 DIAGNOSIS — E63.9 INADEQUATE DIETARY INTAKE OF PROTEIN: ICD-10-CM

## 2020-09-24 DIAGNOSIS — Z15.89 HETEROZYGOUS MTHFR MUTATION C677T: ICD-10-CM

## 2020-09-24 DIAGNOSIS — E03.9 HYPOTHYROIDISM, UNSPECIFIED TYPE: ICD-10-CM

## 2020-09-24 DIAGNOSIS — Z23 NEED FOR VACCINATION: ICD-10-CM

## 2020-09-24 DIAGNOSIS — Z51.81 ENCOUNTER FOR THERAPEUTIC DRUG MONITORING: Primary | ICD-10-CM

## 2020-09-24 DIAGNOSIS — R63.5 WEIGHT GAIN: ICD-10-CM

## 2020-09-24 DIAGNOSIS — E66.3 OVERWEIGHT (BMI 25.0-29.9): ICD-10-CM

## 2020-09-24 PROCEDURE — 99214 OFFICE O/P EST MOD 30 MIN: CPT | Performed by: INTERNAL MEDICINE

## 2020-09-24 PROCEDURE — 3074F SYST BP LT 130 MM HG: CPT | Performed by: INTERNAL MEDICINE

## 2020-09-24 PROCEDURE — 90471 IMMUNIZATION ADMIN: CPT | Performed by: INTERNAL MEDICINE

## 2020-09-24 PROCEDURE — 3078F DIAST BP <80 MM HG: CPT | Performed by: INTERNAL MEDICINE

## 2020-09-24 PROCEDURE — 3008F BODY MASS INDEX DOCD: CPT | Performed by: INTERNAL MEDICINE

## 2020-09-24 PROCEDURE — 90686 IIV4 VACC NO PRSV 0.5 ML IM: CPT | Performed by: INTERNAL MEDICINE

## 2020-09-24 RX ORDER — PHENTERMINE HYDROCHLORIDE 37.5 MG/1
37.5 TABLET ORAL
Qty: 30 TABLET | Refills: 1 | Status: SHIPPED | OUTPATIENT
Start: 2020-09-24 | End: 2021-02-04

## 2020-09-24 RX ORDER — BUPROPION HYDROCHLORIDE 150 MG/1
TABLET, EXTENDED RELEASE ORAL
Qty: 180 TABLET | Refills: 0 | Status: SHIPPED | OUTPATIENT
Start: 2020-09-24 | End: 2020-11-30

## 2020-09-24 RX ORDER — TOPIRAMATE 100 MG/1
100 TABLET, FILM COATED ORAL 2 TIMES DAILY
Qty: 180 TABLET | Refills: 0 | Status: SHIPPED | OUTPATIENT
Start: 2020-09-24 | End: 2020-11-30

## 2020-09-24 RX ORDER — PHENTERMINE HYDROCHLORIDE 15 MG/1
15 CAPSULE ORAL 2 TIMES DAILY
Qty: 60 CAPSULE | Refills: 0 | Status: CANCELLED | OUTPATIENT
Start: 2020-09-24

## 2020-09-24 NOTE — PROGRESS NOTES
HISTORY OF PRESENT ILLNESS  Patient presents with:  Weight Check: up 11 lb      Riley Celestin is a 38year old female here for follow up in medical weight loss program.     Here for follow up   Up 11 lb from previous   Did not tolerate the phentermine 15 mg 08/31/2020    ALT 18 08/31/2020    BILT 0.3 08/31/2020    TP 7.1 08/31/2020    ALB 3.5 08/31/2020    GLOBULT 2.5 01/17/2015    GLOBULIN 3.6 08/31/2020    ALBGLOBRAT 1.7 01/17/2015     08/31/2020    K 3.6 08/31/2020     08/31/2020    CO2 25.0 08 MG Oral Tablet 12 Hr, TAKE 1 TABLET(150 MG) BY MOUTH TWICE DAILY, Disp: 180 tablet, Rfl: 0    •  albuterol sulfate (2.5 MG/3ML) 0.083% Inhalation Nebu Soln, Take 3 mL (2.5 mg total) by nebulization every 4 (four) hours as needed for Wheezing., Disp: 1 Box, last year and lack of response to medication.  Needs to return to basics of tracking and exercise   · No response  trial of phendimetrazine   · -advised of side effects and adverse effects of this medication  · Continue bupropion, stress is doing much abel

## 2020-09-29 ENCOUNTER — PATIENT MESSAGE (OUTPATIENT)
Dept: INTERNAL MEDICINE CLINIC | Facility: CLINIC | Age: 45
End: 2020-09-29

## 2020-09-29 NOTE — TELEPHONE ENCOUNTER
Phentermine 37;5 mg called to [facility]. I spoke with Betsy Michaud there. I then called [facility] to cancel the phentermine RX sent there on 9/24/20 and spoke with Luis Angeles who will delete order for phentermine.

## 2020-09-29 NOTE — TELEPHONE ENCOUNTER
From: Balbir [facility]  To: Ori Orantes MD  Sent: 9/29/2020 7:34 AM CDT  Subject: Prescription Question    Hi are you able to send phentermine 37.5 to [facility] instead of the [facility]? It's cheaper for me to get it from the [facility] without insurance.

## 2020-10-08 ENCOUNTER — TELEPHONE (OUTPATIENT)
Dept: FAMILY MEDICINE CLINIC | Facility: CLINIC | Age: 45
End: 2020-10-08

## 2020-10-08 NOTE — TELEPHONE ENCOUNTER
Patient made appointment through [facility] for sinus infection. Sending message to triage for further questions.     Appointment For: Neelima Guerrero ([id number])   Visit Type: MYCHART FOLLOW UP ([id number])      10/12/2020   3:45 PM  15 mins. FAM Stout

## 2020-10-12 ENCOUNTER — TELEMEDICINE (OUTPATIENT)
Dept: FAMILY MEDICINE CLINIC | Facility: CLINIC | Age: 45
End: 2020-10-12

## 2020-10-12 DIAGNOSIS — J45.41 MODERATE PERSISTENT ASTHMA WITH EXACERBATION: Primary | ICD-10-CM

## 2020-10-12 DIAGNOSIS — J01.01 ACUTE RECURRENT MAXILLARY SINUSITIS: ICD-10-CM

## 2020-10-12 PROCEDURE — 99214 OFFICE O/P EST MOD 30 MIN: CPT | Performed by: FAMILY MEDICINE

## 2020-10-12 RX ORDER — PREDNISONE 20 MG/1
60 TABLET ORAL DAILY
Qty: 21 TABLET | Refills: 0 | Status: SHIPPED | OUTPATIENT
Start: 2020-10-12 | End: 2020-10-19

## 2020-10-12 RX ORDER — DOXYCYCLINE HYCLATE 100 MG
100 TABLET ORAL 2 TIMES DAILY
Qty: 28 TABLET | Refills: 0 | Status: SHIPPED | OUTPATIENT
Start: 2020-10-12 | End: 2020-10-26

## 2020-10-12 RX ORDER — ALBUTEROL SULFATE 2.5 MG/3ML
2.5 SOLUTION RESPIRATORY (INHALATION) EVERY 4 HOURS PRN
Qty: 1 BOX | Refills: 1 | Status: SHIPPED | OUTPATIENT
Start: 2020-10-12

## 2020-10-12 RX ORDER — ALBUTEROL SULFATE 90 UG/1
2 AEROSOL, METERED RESPIRATORY (INHALATION) EVERY 4 HOURS PRN
Qty: 3 INHALER | Refills: 1 | Status: SHIPPED | OUTPATIENT
Start: 2020-10-12 | End: 2020-12-30

## 2020-10-12 NOTE — TELEPHONE ENCOUNTER
Call to pt-discussed info below. Pt sts she spoke w someone from our ofc on Friday-appt was changed to a video visit. appt info confirms this info. Apologized to pt for the duplicate call.

## 2020-10-12 NOTE — PROGRESS NOTES
Virtual/Telephone Check-In    Edgar Arik verbally consents to a Virtual/Telephone Check-In service on 06/10/20. Patient understands and accepts financial responsibility for any deductible, co-insurance and/or co-pays associated with this service.  This vi TABLET(150 MG) BY MOUTH TWICE DAILY 180 tablet 0   • Phentermine HCl 37.5 MG Oral Tab Take 1 tablet (37.5 mg total) by mouth every morning before breakfast. 30 tablet 1   • Levothyroxine Sodium 137 MCG Oral Tab Take 137 mcg by mouth before breakfast. NESSA (60 mg total) by mouth daily for 7 days. Dispense: 21 tablet; Refill: 0  -  Recurrent  -  Start prednisone 60mg daily x 7 days  -  Continue albuterol every 4 hours PRN  -  Recommend immediate care or ER evaluation if condition not improving    2.  Acute re

## 2020-10-13 ENCOUNTER — TELEPHONE (OUTPATIENT)
Dept: FAMILY MEDICINE CLINIC | Facility: CLINIC | Age: 45
End: 2020-10-13

## 2020-10-13 RX ORDER — PREDNISONE 20 MG/1
TABLET ORAL
Qty: 21 TABLET | Refills: 0 | OUTPATIENT
Start: 2020-10-13 | End: 2020-12-30

## 2020-10-13 RX ORDER — PREDNISONE 20 MG/1
TABLET ORAL
Qty: 20 TABLET | Refills: 0 | OUTPATIENT
Start: 2020-10-13 | End: 2020-10-13

## 2020-10-13 NOTE — TELEPHONE ENCOUNTER
I would assume that the first part of the sig pre-populated. Use this as correct sig:  3 tabs PO daily x 3d, 2 tabs PO daily x 3d, 1 tab PO daily x 3d, 1/2 tab PO daily x 3d. \"

## 2020-10-13 NOTE — TELEPHONE ENCOUNTER
Call from maverick/pharmacist-sts received prednisone orders from dr cortez yesterday but questioning conflicting sig info as follows:  \"Sig:   Take 3 tablets (60 mg total) by mouth daily for 7 days.  3 tabs PO daily x 3d, 2 tabs PO daily x 3d, 1 tab PO daily

## 2020-10-20 RX ORDER — FLUTICASONE PROPIONATE AND SALMETEROL 500; 50 UG/1; UG/1
1 POWDER RESPIRATORY (INHALATION) 2 TIMES DAILY
Qty: 3 EACH | Refills: 1 | Status: SHIPPED | OUTPATIENT
Start: 2020-10-20 | End: 2020-11-19

## 2020-11-16 ENCOUNTER — OFFICE VISIT (OUTPATIENT)
Dept: FAMILY MEDICINE CLINIC | Facility: CLINIC | Age: 45
End: 2020-11-16

## 2020-11-16 VITALS
HEART RATE: 88 BPM | TEMPERATURE: 99 F | RESPIRATION RATE: 18 BRPM | SYSTOLIC BLOOD PRESSURE: 102 MMHG | HEIGHT: 64 IN | DIASTOLIC BLOOD PRESSURE: 68 MMHG | BODY MASS INDEX: 30 KG/M2

## 2020-11-16 DIAGNOSIS — M77.11 RIGHT LATERAL EPICONDYLITIS: Primary | ICD-10-CM

## 2020-11-16 DIAGNOSIS — M25.571 ACUTE RIGHT ANKLE PAIN: ICD-10-CM

## 2020-11-16 PROCEDURE — 3078F DIAST BP <80 MM HG: CPT | Performed by: FAMILY MEDICINE

## 2020-11-16 PROCEDURE — 99214 OFFICE O/P EST MOD 30 MIN: CPT | Performed by: FAMILY MEDICINE

## 2020-11-16 PROCEDURE — 3074F SYST BP LT 130 MM HG: CPT | Performed by: FAMILY MEDICINE

## 2020-11-16 RX ORDER — METHYLPREDNISOLONE 4 MG/1
TABLET ORAL
Qty: 1 KIT | Refills: 0 | Status: SHIPPED | OUTPATIENT
Start: 2020-11-16 | End: 2021-03-31 | Stop reason: ALTCHOICE

## 2020-11-18 NOTE — PROGRESS NOTES
[facility] Family Medicine Office Note  Chief Complaint:   Patient presents with:  Arm Pain: x 1 week fell down stairs (6)  Ankle Pain      HPI:   This is a 38year old female coming in for right elbow and ankle pain.   Pain started about one week Throat  Levaquin [Levofloxa*    RASH  Nsaids                    Penicillins                 Comment:As a Child  Current Meds:  Current Outpatient Medications   Medication Sig Dispense Refill   • methylPREDNISolone (MEDROL) 4 MG Oral Tablet Therapy Pack As B-12 OR) Take by mouth. Counseling given: Not Answered       REVIEW OF SYSTEMS:   ROS:  CONSTITUTIONAL:  Denies any unusual weight gain/loss, fever, chills, weakness or fatigue.   CARDIOVASCULAR:  Denies chest pain, chest pressure or chest discomfort pain  -  Likely sprain  -  Start medrol dose silke  -  Consider xray if no improvement  -  Ice and elevation  -  F/u if symptoms persist  - methylPREDNISolone (MEDROL) 4 MG Oral Tablet Therapy Pack; As directed. Dispense: 1 kit;  Refill: 0      Meds & Refill

## 2020-11-19 ENCOUNTER — MED REC SCAN ONLY (OUTPATIENT)
Dept: FAMILY MEDICINE CLINIC | Facility: CLINIC | Age: 45
End: 2020-11-19

## 2020-11-30 ENCOUNTER — TELEMEDICINE (OUTPATIENT)
Dept: INTERNAL MEDICINE CLINIC | Facility: CLINIC | Age: 45
End: 2020-11-30

## 2020-11-30 DIAGNOSIS — Z51.81 ENCOUNTER FOR THERAPEUTIC DRUG MONITORING: Primary | ICD-10-CM

## 2020-11-30 DIAGNOSIS — E66.3 OVERWEIGHT (BMI 25.0-29.9): ICD-10-CM

## 2020-11-30 PROCEDURE — 99213 OFFICE O/P EST LOW 20 MIN: CPT | Performed by: INTERNAL MEDICINE

## 2020-11-30 RX ORDER — BUPROPION HYDROCHLORIDE 150 MG/1
TABLET, EXTENDED RELEASE ORAL
Qty: 180 TABLET | Refills: 0 | Status: SHIPPED | OUTPATIENT
Start: 2020-11-30 | End: 2021-02-04

## 2020-11-30 RX ORDER — TOPIRAMATE 100 MG/1
100 TABLET, FILM COATED ORAL 2 TIMES DAILY
Qty: 180 TABLET | Refills: 0 | Status: SHIPPED | OUTPATIENT
Start: 2020-11-30 | End: 2021-07-14

## 2020-11-30 RX ORDER — PHENTERMINE HYDROCHLORIDE 37.5 MG/1
37.5 TABLET ORAL
Qty: 30 TABLET | Refills: 1 | Status: SHIPPED | OUTPATIENT
Start: 2020-11-30 | End: 2021-04-01

## 2020-11-30 NOTE — PROGRESS NOTES
HISTORY OF PRESENT ILLNESS  Patient presents with:  Weight Check: video visit       Genoveva Valiente is a 38year old female here for follow up in medical weight loss program.   Did feel she was doing well until vacation and lost 8 lb while she was traveling BILT 0.3 08/31/2020    TP 7.1 08/31/2020    ALB 3.5 08/31/2020    GLOBULT 2.5 01/17/2015    GLOBULIN 3.6 08/31/2020    ALBGLOBRAT 1.7 01/17/2015     08/31/2020    K 3.6 08/31/2020     08/31/2020    CO2 25.0 08/31/2020     Lab Results   [place] DAILY, Disp: 90 tablet, Rfl: 1    •  Tiotropium Bromide Monohydrate (SPIRIVA RESPIMAT) 2.5 MCG/ACT Inhalation Aero Soln, Inhale 2.5 mcg into the lungs daily. , Disp: 3 Inhaler, Rfl: 0    •  Montelukast Sodium 10 MG Oral Tab, Take 1 tablet (10 mg total) by m adjustment   · Nutrition: low carb diet/ recommended to eat breakfast daily/ regular protein intake  · Medication use and side effects reviewed with patient. Medication contraindications: n/a   · Follow up with dietitian and psychologist as recommended.

## 2020-12-30 ENCOUNTER — TELEMEDICINE (OUTPATIENT)
Dept: FAMILY MEDICINE CLINIC | Facility: CLINIC | Age: 45
End: 2020-12-30

## 2020-12-30 DIAGNOSIS — J01.01 ACUTE RECURRENT MAXILLARY SINUSITIS: ICD-10-CM

## 2020-12-30 DIAGNOSIS — J45.41 MODERATE PERSISTENT ASTHMA WITH EXACERBATION: Primary | ICD-10-CM

## 2020-12-30 PROCEDURE — 99214 OFFICE O/P EST MOD 30 MIN: CPT | Performed by: FAMILY MEDICINE

## 2020-12-30 RX ORDER — ALBUTEROL SULFATE 90 UG/1
2 AEROSOL, METERED RESPIRATORY (INHALATION) EVERY 4 HOURS PRN
Qty: 3 INHALER | Refills: 1 | Status: SHIPPED | OUTPATIENT
Start: 2020-12-30 | End: 2021-08-17

## 2020-12-30 RX ORDER — PREDNISONE 20 MG/1
TABLET ORAL
Qty: 21 TABLET | Refills: 0 | Status: SHIPPED | OUTPATIENT
Start: 2020-12-30 | End: 2021-03-31 | Stop reason: ALTCHOICE

## 2020-12-30 RX ORDER — DOXYCYCLINE HYCLATE 100 MG
100 TABLET ORAL 2 TIMES DAILY
Qty: 28 TABLET | Refills: 0 | Status: SHIPPED | OUTPATIENT
Start: 2020-12-30 | End: 2021-01-13

## 2020-12-30 NOTE — PROGRESS NOTES
Virtual/Telephone Check-In    Ricardo Rios verbally consents to a Virtual/Telephone Check-In service on 12/30/20. Patient understands and accepts financial responsibility for any deductible, co-insurance and/or co-pays associated with this service.  This vi Oral Tab Take 1 tablet (100 mg total) by mouth 2 (two) times daily.  180 tablet 0   • buPROPion HCl ER, SR, 150 MG Oral Tablet 12 Hr TAKE 1 TABLET(150 MG) BY MOUTH TWICE DAILY 180 tablet 0   • methylPREDNISolone (MEDROL) 4 MG Oral Tablet Therapy Pack As dir work of breathing      Diagnosis:  1. Moderate persistent asthma with exacerbation  - predniSONE 20 MG Oral Tab; Take 3 tablets (60 mg total) by mouth daily for 7 days. Dispense: 21 tablet;  Refill: 0  -  Recurrent  -  Start prednisone 60mg daily x 7 days

## 2021-01-23 ENCOUNTER — LABORATORY ENCOUNTER (OUTPATIENT)
Dept: LAB | Age: 46
End: 2021-01-23
Attending: OTOLARYNGOLOGY
Payer: COMMERCIAL

## 2021-01-23 DIAGNOSIS — E03.9 HYPOTHYROIDISM, UNSPECIFIED TYPE: ICD-10-CM

## 2021-01-23 DIAGNOSIS — J32.0 CHRONIC MAXILLARY SINUSITIS: ICD-10-CM

## 2021-01-23 LAB
BASOPHILS # BLD AUTO: 0.14 X10(3) UL (ref 0–0.2)
BASOPHILS NFR BLD AUTO: 1.5 %
DEPRECATED RDW RBC AUTO: 44 FL (ref 35.1–46.3)
EOS CT CALC: 1750 MM3 (ref 0–300)
EOSINOPHIL # BLD AUTO: 1.75 X10(3) UL (ref 0–0.7)
EOSINOPHIL NFR BLD AUTO: 18.7 %
ERYTHROCYTE [DISTWIDTH] IN BLOOD BY AUTOMATED COUNT: 13 % (ref 11–15)
HCT VFR BLD AUTO: 46.2 %
HGB BLD-MCNC: 14.7 G/DL
IGA SERPL-MCNC: 235 MG/DL (ref 70–312)
IGM SERPL-MCNC: 169 MG/DL (ref 43–279)
IMM GRANULOCYTES # BLD AUTO: 0.03 X10(3) UL (ref 0–1)
IMM GRANULOCYTES NFR BLD: 0.3 %
IMMUNOGLOBULIN E: 71.5 IU/ML (ref 3.6–114)
IMMUNOGLOBULIN PNL SER-MCNC: 1020 MG/DL (ref 791–1643)
LYMPHOCYTES # BLD AUTO: 2.01 X10(3) UL (ref 1–4)
LYMPHOCYTES NFR BLD AUTO: 21.5 %
MCH RBC QN AUTO: 29.6 PG (ref 26–34)
MCHC RBC AUTO-ENTMCNC: 31.8 G/DL (ref 31–37)
MCV RBC AUTO: 93 FL
MONOCYTES # BLD AUTO: 0.72 X10(3) UL (ref 0.1–1)
MONOCYTES NFR BLD AUTO: 7.7 %
NEUTROPHILS # BLD AUTO: 4.69 X10 (3) UL (ref 1.5–7.7)
NEUTROPHILS # BLD AUTO: 4.69 X10(3) UL (ref 1.5–7.7)
NEUTROPHILS NFR BLD AUTO: 50.3 %
PLATELET # BLD AUTO: 230 10(3)UL (ref 150–450)
RBC # BLD AUTO: 4.97 X10(6)UL
T4 FREE SERPL-MCNC: 1.1 NG/DL (ref 0.8–1.7)
TSI SER-ACNC: 4.16 MIU/ML (ref 0.36–3.74)
WBC # BLD AUTO: 9.3 X10(3) UL (ref 4–11)

## 2021-01-23 PROCEDURE — 82784 ASSAY IGA/IGD/IGG/IGM EACH: CPT

## 2021-01-23 PROCEDURE — 82785 ASSAY OF IGE: CPT

## 2021-01-23 PROCEDURE — 84443 ASSAY THYROID STIM HORMONE: CPT

## 2021-01-23 PROCEDURE — 84439 ASSAY OF FREE THYROXINE: CPT

## 2021-01-23 PROCEDURE — 85025 COMPLETE CBC W/AUTO DIFF WBC: CPT

## 2021-01-23 PROCEDURE — 36415 COLL VENOUS BLD VENIPUNCTURE: CPT

## 2021-01-23 PROCEDURE — 85048 AUTOMATED LEUKOCYTE COUNT: CPT

## 2021-01-25 ENCOUNTER — TELEPHONE (OUTPATIENT)
Dept: FAMILY MEDICINE CLINIC | Facility: CLINIC | Age: 46
End: 2021-01-25

## 2021-01-25 DIAGNOSIS — E03.9 HYPOTHYROIDISM, UNSPECIFIED TYPE: Primary | ICD-10-CM

## 2021-01-25 RX ORDER — LEVOTHYROXINE SODIUM 0.15 MG/1
150 TABLET ORAL
Qty: 90 TABLET | Refills: 0 | Status: SHIPPED | OUTPATIENT
Start: 2021-01-25 | End: 2021-05-04 | Stop reason: DRUGHIGH

## 2021-01-27 ENCOUNTER — TELEPHONE (OUTPATIENT)
Dept: FAMILY MEDICINE CLINIC | Facility: CLINIC | Age: 46
End: 2021-01-27

## 2021-01-27 ENCOUNTER — TELEMEDICINE (OUTPATIENT)
Dept: FAMILY MEDICINE CLINIC | Facility: CLINIC | Age: 46
End: 2021-01-27

## 2021-01-27 DIAGNOSIS — J45.41 MODERATE PERSISTENT ASTHMA WITH EXACERBATION: Primary | ICD-10-CM

## 2021-01-27 DIAGNOSIS — J01.01 ACUTE RECURRENT MAXILLARY SINUSITIS: ICD-10-CM

## 2021-01-27 PROCEDURE — 99214 OFFICE O/P EST MOD 30 MIN: CPT | Performed by: FAMILY MEDICINE

## 2021-01-27 RX ORDER — DOXYCYCLINE HYCLATE 100 MG
100 TABLET ORAL 2 TIMES DAILY
Qty: 28 TABLET | Refills: 0 | Status: SHIPPED | OUTPATIENT
Start: 2021-01-27 | End: 2021-02-10

## 2021-01-27 RX ORDER — PREDNISONE 20 MG/1
TABLET ORAL
Qty: 20 TABLET | Refills: 0 | Status: SHIPPED | OUTPATIENT
Start: 2021-01-27 | End: 2021-03-31 | Stop reason: ALTCHOICE

## 2021-01-27 NOTE — TELEPHONE ENCOUNTER
Patient made appt through [facility]. Sent to triage for further questions.   Sinuses    Appointment For: Ravindra Tejada ([id number])   Visit Type: Debbie  79. UP (0135)      1/27/2021    2:15 PM  15 mins. Vadim Madrigal DO   [facility] [address]

## 2021-01-27 NOTE — TELEPHONE ENCOUNTER
Noted. Call to pt-advised dr cortez confirms may proceed w virtual visit today as planned. Patient voices understanding/agrees with plan/no further questions.

## 2021-01-27 NOTE — PROGRESS NOTES
Virtual/Telephone Check-In    Raghavluiselysia Agostojhonathan verbally consents to a Virtual/Telephone Check-In service on 12/30/20. Patient understands and accepts financial responsibility for any deductible, co-insurance and/or co-pays associated with this service.  This vi Inhalation Aero Soln Inhale 2 puffs into the lungs every 4 (four) hours as needed for Wheezing.  3 Inhaler 1   • Phentermine HCl 37.5 MG Oral Tab Take 1 tablet (37.5 mg total) by mouth every morning before breakfast. 30 tablet 1   • topiramate 100 MG Oral T 18/45 genotype 04/03/2019 )      Physical Exam:  GEN: A&Ox3, NAD  Patient speaking in full sentences  No increased work of breathing      Diagnosis:  1.  Moderate persistent asthma with exacerbation  -  Recurrent  -  Start prednisone taper  -  Continue albu

## 2021-01-27 NOTE — TELEPHONE ENCOUNTER
Per info below, pt has video visit sched li cortez for 215 pm today for \"sinuses/asthma\"  Last appt w dr cortez 12/30/2020 for same dx.  Also saw dr Mikal Molina 1/6/21:seasonal allergic rhinitis, nasal turbinate hypertrophy and chronic maxillary sinusitis-OV n

## 2021-01-28 NOTE — PROGRESS NOTES
Please call and inform eosinphil count is high rest of levels are normal. She is to keep her follow up with DR Lm Gorman to further discuss next step

## 2021-02-03 ENCOUNTER — OFFICE VISIT (OUTPATIENT)
Dept: ALLERGY | Facility: CLINIC | Age: 46
End: 2021-02-03
Payer: COMMERCIAL

## 2021-02-03 ENCOUNTER — TELEPHONE (OUTPATIENT)
Dept: ALLERGY | Facility: CLINIC | Age: 46
End: 2021-02-03

## 2021-02-03 ENCOUNTER — LAB ENCOUNTER (OUTPATIENT)
Dept: LAB | Age: 46
End: 2021-02-03
Attending: ALLERGY & IMMUNOLOGY
Payer: COMMERCIAL

## 2021-02-03 VITALS — OXYGEN SATURATION: 97 % | SYSTOLIC BLOOD PRESSURE: 124 MMHG | HEART RATE: 86 BPM | DIASTOLIC BLOOD PRESSURE: 89 MMHG

## 2021-02-03 DIAGNOSIS — J30.9 ALLERGIC RHINITIS, UNSPECIFIED SEASONALITY, UNSPECIFIED TRIGGER: ICD-10-CM

## 2021-02-03 DIAGNOSIS — J33.9 NASAL POLYP: ICD-10-CM

## 2021-02-03 DIAGNOSIS — Z88.6 ALLERGY TO NONSTEROIDAL ANTI-INFLAMMATORY DRUG (NSAID): ICD-10-CM

## 2021-02-03 DIAGNOSIS — J45.40 MODERATE PERSISTENT EXTRINSIC ASTHMA WITHOUT COMPLICATION: Primary | ICD-10-CM

## 2021-02-03 DIAGNOSIS — J32.2 CHRONIC ETHMOIDAL SINUSITIS: ICD-10-CM

## 2021-02-03 DIAGNOSIS — J45.40 MODERATE PERSISTENT EXTRINSIC ASTHMA WITHOUT COMPLICATION: ICD-10-CM

## 2021-02-03 LAB — ERYTHROCYTE [SEDIMENTATION RATE] IN BLOOD: 9 MM/HR

## 2021-02-03 PROCEDURE — 3074F SYST BP LT 130 MM HG: CPT | Performed by: ALLERGY & IMMUNOLOGY

## 2021-02-03 PROCEDURE — 36415 COLL VENOUS BLD VENIPUNCTURE: CPT

## 2021-02-03 PROCEDURE — 86003 ALLG SPEC IGE CRUDE XTRC EA: CPT | Performed by: ALLERGY & IMMUNOLOGY

## 2021-02-03 PROCEDURE — 86003 ALLG SPEC IGE CRUDE XTRC EA: CPT

## 2021-02-03 PROCEDURE — 86255 FLUORESCENT ANTIBODY SCREEN: CPT

## 2021-02-03 PROCEDURE — 3079F DIAST BP 80-89 MM HG: CPT | Performed by: ALLERGY & IMMUNOLOGY

## 2021-02-03 PROCEDURE — 99244 OFF/OP CNSLTJ NEW/EST MOD 40: CPT | Performed by: ALLERGY & IMMUNOLOGY

## 2021-02-03 PROCEDURE — 83516 IMMUNOASSAY NONANTIBODY: CPT

## 2021-02-03 PROCEDURE — 83876 ASSAY MYELOPEROXIDASE: CPT

## 2021-02-03 PROCEDURE — 85652 RBC SED RATE AUTOMATED: CPT

## 2021-02-03 PROCEDURE — 82785 ASSAY OF IGE: CPT | Performed by: ALLERGY & IMMUNOLOGY

## 2021-02-03 RX ORDER — PREDNISONE 10 MG/1
TABLET ORAL
Qty: 38 TABLET | Refills: 0 | Status: SHIPPED | OUTPATIENT
Start: 2021-02-03 | End: 2021-03-31 | Stop reason: ALTCHOICE

## 2021-02-03 RX ORDER — SULFAMETHOXAZOLE AND TRIMETHOPRIM 800; 160 MG/1; MG/1
1 TABLET ORAL 2 TIMES DAILY
Qty: 20 TABLET | Refills: 0 | Status: SHIPPED | OUTPATIENT
Start: 2021-02-03 | End: 2021-02-13

## 2021-02-03 NOTE — PATIENT INSTRUCTIONS
1. Asthma   Moderate to severe asthma with an eosinophilic disposition  Patient with prior hospitalizations for asthma as well as intubation 3 years ago.   Absolute eosinophil count last month was 1700+    Handouts on asthma triggers and management provided

## 2021-02-03 NOTE — TELEPHONE ENCOUNTER
Enrollment Form and copy of insurance card faxed to [place] [id number]. Succesful fax confirmation received.     Will await Benefits Investigation Summary

## 2021-02-03 NOTE — TELEPHONE ENCOUNTER
Patient in office for Consult. Patient completed Dupixent Enrollment Form. RN completed the office portion. Dr. Victoria Carranza when you get a chance would you mind filling out the diagnosis/dosing information?

## 2021-02-04 ENCOUNTER — TELEPHONE (OUTPATIENT)
Dept: ALLERGY | Facility: CLINIC | Age: 46
End: 2021-02-04

## 2021-02-04 ENCOUNTER — TELEMEDICINE (OUTPATIENT)
Dept: INTERNAL MEDICINE CLINIC | Facility: CLINIC | Age: 46
End: 2021-02-04

## 2021-02-04 DIAGNOSIS — E66.3 OVERWEIGHT (BMI 25.0-29.9): ICD-10-CM

## 2021-02-04 DIAGNOSIS — Z51.81 ENCOUNTER FOR THERAPEUTIC DRUG MONITORING: Primary | ICD-10-CM

## 2021-02-04 LAB
A ALTERNATA IGE QN: <0.1 KUA/L (ref ?–0.1)
A FUMIGATUS IGE QN: <0.1 KUA/L (ref ?–0.1)
AMER SYCAMORE IGE QN: <0.1 KUA/L (ref ?–0.1)
BERMUDA GRASS IGE QN: <0.1 KUA/L (ref ?–0.1)
BOXELDER IGE QN: <0.1 KUA/L (ref ?–0.1)
C HERBARUM IGE QN: <0.1 KUA/L (ref ?–0.1)
CALIF WALNUT IGE QN: <0.1 KUA/L (ref ?–0.1)
CAT DANDER IGE QN: 0.53 KUA/L (ref ?–0.1)
CLAM IGE QN: <0.1 KUA/L (ref ?–0.1)
CMN PIGWEED IGE QN: <0.1 KUA/L (ref ?–0.1)
CODFISH IGE QN: <0.1 KUA/L (ref ?–0.1)
COMMON RAGWEED IGE QN: <0.1 KUA/L (ref ?–0.1)
CORN IGE QN: <0.1 KUA/L (ref ?–0.1)
COTTONWOOD IGE QN: <0.1 KUA/L (ref ?–0.1)
COW MILK IGE QN: <0.1 KUA/L (ref ?–0.1)
D FARINAE IGE QN: <0.1 KUA/L (ref ?–0.1)
D PTERONYSS IGE QN: <0.1 KUA/L (ref ?–0.1)
DOG DANDER IGE QN: 4.02 KUA/L (ref ?–0.1)
EGG WHITE IGE QN: <0.1 KUA/L (ref ?–0.1)
IGE SERPL-ACNC: 69.9 KU/L (ref 2–214)
IGE SERPL-ACNC: 70 KU/L (ref 2–214)
M RACEMOSUS IGE QN: <0.1 KUA/L (ref ?–0.1)
MARSH ELDER IGE QN: <0.1 KUA/L (ref ?–0.1)
MOUSE EPITH IGE QN: 0.34 KUA/L (ref ?–0.1)
MT JUNIPER IGE QN: <0.1 KUA/L (ref ?–0.1)
P NOTATUM IGE QN: <0.1 KUA/L (ref ?–0.1)
PEANUT IGE QN: <0.1 KUA/L (ref ?–0.1)
PECAN/HICK TREE IGE QN: <0.1 KUA/L (ref ?–0.1)
ROACH IGE QN: <0.1 KUA/L (ref ?–0.1)
SALTWORT IGE QN: <0.1 KUA/L (ref ?–0.1)
SCALLOP IGE QN: <0.1 KUA/L (ref ?–0.1)
SESAME SEED IGE QN: <0.1 KUA/L (ref ?–0.1)
SHRIMP IGE QN: <0.1 KUA/L (ref ?–0.1)
SOYBEAN IGE QN: <0.1 KUA/L (ref ?–0.1)
TIMOTHY IGE QN: <0.1 KUA/L (ref ?–0.1)
WALNUT IGE QN: <0.1 KUA/L (ref ?–0.1)
WHEAT IGE QN: <0.1 KUA/L (ref ?–0.1)
WHITE ASH IGE QN: <0.1 KUA/L (ref ?–0.1)
WHITE ELM IGE QN: <0.1 KUA/L (ref ?–0.1)
WHITE MULBERRY IGE QN: <0.1 KUA/L (ref ?–0.1)
WHITE OAK IGE QN: <0.1 KUA/L (ref ?–0.1)

## 2021-02-04 PROCEDURE — 99213 OFFICE O/P EST LOW 20 MIN: CPT | Performed by: INTERNAL MEDICINE

## 2021-02-04 RX ORDER — BUPROPION HYDROCHLORIDE 150 MG/1
TABLET, EXTENDED RELEASE ORAL
Qty: 180 TABLET | Refills: 1 | Status: SHIPPED | OUTPATIENT
Start: 2021-02-04 | End: 2021-07-14

## 2021-02-04 RX ORDER — PHENTERMINE HYDROCHLORIDE 37.5 MG/1
37.5 TABLET ORAL
Qty: 30 TABLET | Refills: 1 | Status: SHIPPED | OUTPATIENT
Start: 2021-02-04 | End: 2021-04-01

## 2021-02-04 NOTE — TELEPHONE ENCOUNTER
Awaiting ANCA panel and allergy region 8 panel. Written by Cookie Denis MD on 2/4/2021 12:58 PM  [place] [facility] your blood testing to common food allergens has returned and was negative/normal.. No common food allergens detected.   Regards, Dr Bhavana Beatty.  Nicho Gould

## 2021-02-04 NOTE — TELEPHONE ENCOUNTER
----- Message from Angela Cox MD sent at 2/3/2021  2:19 PM CST -----  Bay Carlson, your sed rate/ESR has returned and is normal.  This is good news. No signs of systemic inflammation in the body.   Regards, Dr. Pierre Kaur

## 2021-02-04 NOTE — PROGRESS NOTES
HISTORY OF PRESENT ILLNESS  Patient presents with:  Weight Check: Seema Nurse is a 54year old female here for follow up in medical weight loss program.   Restarted new dose of thyroid medication   Was on 2 rounds of prednisone this month as w ALB 3.5 08/31/2020    GLOBULT 2.5 01/17/2015    GLOBULIN 3.6 08/31/2020    ALBGLOBRAT 1.7 01/17/2015     08/31/2020    K 3.6 08/31/2020     08/31/2020    CO2 25.0 08/31/2020     Lab Results   Component Value Date    EAG 85 08/31/2020    A1C 4.6 days, Disp: 21 tablet, Rfl: 0    •  Albuterol Sulfate  (90 Base) MCG/ACT Inhalation Aero Soln, Inhale 2 puffs into the lungs every 4 (four) hours as needed for Wheezing., Disp: 3 Inhaler, Rfl: 1    •  Phentermine HCl 37.5 MG Oral Tab, Take 1 tablet TABLET(150 MG) BY MOUTH TWICE DAILY         PLAN  · Initial consult: 8/2/16 with [facility]   · Weight max 4/3/19 on 179 lb   · Down to 172 lb    continue phetfermine   -advised of side effects and adverse effects of this medication  · Needs to track nutrition, i completed using two-way, real-time interactive audio and video communication.   This has been done in good lilliam to provide continuity of care in the best interest of the provider-patient relationship, due to the ongoing public health crisis/national emerge

## 2021-02-08 DIAGNOSIS — J30.2 SEASONAL ALLERGIES: ICD-10-CM

## 2021-02-08 LAB
MYELOPEROX ANTIBODIES, IGG: 1 AU/ML
SERINE PROTEASE3, IGG: 5 AU/ML

## 2021-02-08 RX ORDER — CETIRIZINE HYDROCHLORIDE 10 MG/1
TABLET ORAL
Qty: 90 TABLET | Refills: 1 | Status: SHIPPED | OUTPATIENT
Start: 2021-02-08 | End: 2021-07-21

## 2021-02-15 NOTE — TELEPHONE ENCOUNTER
Fax from [facility] received requesting to complete PA and send back. Call [facility] to figure out who Specialty Pharmacy and Pharmacy Benefit Manager is as we did not receive Benefits Summary yet.

## 2021-02-15 NOTE — TELEPHONE ENCOUNTER
Josh Weeks from [facility] faxed PA form on 2/12. Calling for confirmation and status.

## 2021-02-15 NOTE — TELEPHONE ENCOUNTER
RN called to notify patient that in order to complete and submit PA we will need her to complete her PFT testing. Patient verbalizes understanding and reports that she will call to get that scheduled.

## 2021-02-17 NOTE — TELEPHONE ENCOUNTER
Spoke with [facility] from [place] (who is the patient's Pharmacy Benefit Manager) to notify her that PA has not been submitted yet because patient needs to complete her PFT testing so we can answer all PA questions.     Benefits Summary never rec

## 2021-02-19 ENCOUNTER — LAB ENCOUNTER (OUTPATIENT)
Dept: LAB | Age: 46
End: 2021-02-19
Attending: ALLERGY & IMMUNOLOGY
Payer: COMMERCIAL

## 2021-02-19 DIAGNOSIS — J45.40 MODERATE PERSISTENT EXTRINSIC ASTHMA WITHOUT COMPLICATION: ICD-10-CM

## 2021-02-20 LAB — SARS-COV-2 RNA RESP QL NAA+PROBE: NOT DETECTED

## 2021-02-22 ENCOUNTER — TELEPHONE (OUTPATIENT)
Dept: ALLERGY | Facility: CLINIC | Age: 46
End: 2021-02-22

## 2021-02-22 ENCOUNTER — RT VISIT (OUTPATIENT)
Dept: RESPIRATORY THERAPY | Facility: HOSPITAL | Age: 46
End: 2021-02-22
Attending: ALLERGY & IMMUNOLOGY
Payer: COMMERCIAL

## 2021-02-22 DIAGNOSIS — J45.40 MODERATE PERSISTENT EXTRINSIC ASTHMA WITHOUT COMPLICATION: ICD-10-CM

## 2021-02-22 PROCEDURE — 94726 PLETHYSMOGRAPHY LUNG VOLUMES: CPT

## 2021-02-22 PROCEDURE — 94729 DIFFUSING CAPACITY: CPT

## 2021-02-22 PROCEDURE — 94060 EVALUATION OF WHEEZING: CPT

## 2021-02-22 NOTE — TELEPHONE ENCOUNTER
Per Dr. Mallorie Magana, may offer patient samples to begin her [address] for better asthma management. Patient completed her PFT Test today--awaiting results in order to complete and submit PA for [employer].     Patient is coming in on 2/24 for follow up and first do

## 2021-02-22 NOTE — PROCEDURES
Findings:  Postbronchodilator FEV1 is 2.69L, 94% predicted. Postbronchodilator FVC is 3.67L, 103% predicted. FEV1/ FVC ratio is 0.73. There is a significant bronchodilator response after   administration of albuterol.    The flow-volume loop suggests an

## 2021-02-23 NOTE — TELEPHONE ENCOUNTER
LM to notify patient PFT results are available. Sent [employer] message with results and commentary by Dr. Harmony Tejeda.   Please let us know if any questions.     ----- Message from Debbi Orantes MD sent at 2/22/2021  4:41 PM CST -----  Please call patient with

## 2021-02-23 NOTE — TELEPHONE ENCOUNTER
Forms signed by Dr. Dusty Dooley and faxed to [facility]. Awaiting successful fax confirmation.

## 2021-02-23 NOTE — TELEPHONE ENCOUNTER
Rodney form completed and documents gathered to send to [address] to begin [address] PA and coordination. Dr. Marsha Dubose to sign highlighted portion and RN to fax all papers to [address]. Placed on Dr. Leonora Antoine desk.

## 2021-02-24 ENCOUNTER — OFFICE VISIT (OUTPATIENT)
Dept: ALLERGY | Facility: CLINIC | Age: 46
End: 2021-02-24
Payer: COMMERCIAL

## 2021-02-24 ENCOUNTER — NURSE ONLY (OUTPATIENT)
Dept: ALLERGY | Facility: CLINIC | Age: 46
End: 2021-02-24
Payer: COMMERCIAL

## 2021-02-24 VITALS
OXYGEN SATURATION: 97 % | HEART RATE: 104 BPM | DIASTOLIC BLOOD PRESSURE: 82 MMHG | SYSTOLIC BLOOD PRESSURE: 122 MMHG | RESPIRATION RATE: 17 BRPM

## 2021-02-24 DIAGNOSIS — J45.40 MODERATE PERSISTENT EXTRINSIC ASTHMA WITHOUT COMPLICATION: Primary | ICD-10-CM

## 2021-02-24 DIAGNOSIS — J30.9 ALLERGIC RHINITIS, UNSPECIFIED SEASONALITY, UNSPECIFIED TRIGGER: ICD-10-CM

## 2021-02-24 DIAGNOSIS — J32.2 CHRONIC ETHMOIDAL SINUSITIS: ICD-10-CM

## 2021-02-24 DIAGNOSIS — J33.9 NASAL POLYP: ICD-10-CM

## 2021-02-24 DIAGNOSIS — J45.40 MODERATE PERSISTENT EXTRINSIC ASTHMA WITHOUT COMPLICATION: ICD-10-CM

## 2021-02-24 PROCEDURE — 99214 OFFICE O/P EST MOD 30 MIN: CPT | Performed by: ALLERGY & IMMUNOLOGY

## 2021-02-24 PROCEDURE — 96372 THER/PROPH/DIAG INJ SC/IM: CPT | Performed by: ALLERGY & IMMUNOLOGY

## 2021-02-24 PROCEDURE — 3074F SYST BP LT 130 MM HG: CPT | Performed by: ALLERGY & IMMUNOLOGY

## 2021-02-24 PROCEDURE — 3079F DIAST BP 80-89 MM HG: CPT | Performed by: ALLERGY & IMMUNOLOGY

## 2021-02-24 RX ORDER — FLUTICASONE FUROATE, UMECLIDINIUM BROMIDE AND VILANTEROL TRIFENATATE 200; 62.5; 25 UG/1; UG/1; UG/1
1 POWDER RESPIRATORY (INHALATION) DAILY
Qty: 3 EACH | Refills: 1 | Status: SHIPPED | OUTPATIENT
Start: 2021-02-24

## 2021-02-24 NOTE — PROGRESS NOTES
Dupixent Progress Note    See \"vitals\" flow sheet for vital sign detail. See MAR for injection detail. Pt received initial loading dose today. Dupixent 300 mg syringe in left and right upper arms at 1415 for a total of 600 mg today. Samples given.

## 2021-02-24 NOTE — PATIENT INSTRUCTIONS
1. Eosinophilic asthma  Start Dupixent.   Samples given in office today 600mg  Subsequent dosing will be 300 mg every 2 weeks once approved by her insurance  Continue with current asthma regimen and medications  Flu vaccine up-to-date  Recommend Pneumovax

## 2021-02-24 NOTE — PROGRESS NOTES
Alexi Gomez is a 54year old female. HPI:   No chief complaint on file. Patient is a 50-year-old female who presents for follow-up.   Patient has a history of persistent asthma with an eosinophilic disposition, chronic sinusitis with nasal polyps and based upon her FEV1 FEC ratio with significant reversibility after bronchodilator suggesting asthma. Application for Dupixent has been submitted.   Awaiting approval.    Patient presents for initiation of Dupixent via sample today    Patient seen by ENT Tab TAKE 1 TABLET(10 MG) BY MOUTH DAILY 90 tablet 1   • Phentermine HCl 37.5 MG Oral Tab Take 1 tablet (37.5 mg total) by mouth every morning before breakfast. 30 tablet 1   • buPROPion HCl ER, SR, 150 MG Oral Tablet 12 Hr TAKE 1 TABLET(150 MG) BY MOUTH TW 1 tablet (10 mg total) by mouth nightly. 90 tablet 1   • Ipratropium Bromide 0.02 % Inhalation Solution USE 2.5 ML VIA NEBULIZER TWICE DAILY 62.5 mL 0   • triamcinolone acetonide 0.1 % External Cream Apply topically 2 (two) times daily as needed.  (Patient polyp  Allergic rhinitis, unspecified seasonality, unspecified trigger    1. Eosinophilic asthma  Start Dupixent.   Samples given in office today 600mg  Subsequent dosing will be 300 mg every 2 weeks once approved by her insurance  Continue with current as the patient by myself. Teaching included  a review of potential adverse side effects as well as potential efficacy. Patient's questions were answered in regards to medication administration and dosing and potential side effects.  Teaching was provided via

## 2021-02-25 NOTE — TELEPHONE ENCOUNTER
Attempted faxing forms multiple times to [facility] if they made it or not. RN reached out to Shiva Green from [facility] [place] forms to specialty fax line [phone]. Awaiting fax confirmation.

## 2021-03-02 NOTE — TELEPHONE ENCOUNTER
Heather Muller contacted at [phone], spoke with pharmacy , Mariana Medel.     Patient's Jomar Blackmon MRN #: [id number]    Rep reports that [address] form for Dupixent has been received with corresponding clinical documentati

## 2021-03-08 ENCOUNTER — TELEPHONE (OUTPATIENT)
Dept: ALLERGY | Facility: CLINIC | Age: 46
End: 2021-03-08

## 2021-03-08 NOTE — TELEPHONE ENCOUNTER
As noted above-PA cancelled. Medication PA Requested:   Trelegy Ellipta 200-62.5-25 MCG/INH                                                      CoverMyMeds Used: yes   Key:   [id number]  Sig: Inhale 1 puff into the lungs daily.   DX Code:    J45.40

## 2021-03-08 NOTE — TELEPHONE ENCOUNTER
Just to be sure, For Willy Galvan do you want to use diagnosis code J33.9 nasal polyp as noted below or J45.40 the mod persistent asthma? Please clarify. Thank you.

## 2021-03-08 NOTE — TELEPHONE ENCOUNTER
Fax received from [employer] denying Dupixent 300 mg/2 mL prefilled syringe coverage. In order to approve coverage of this request, program criteria must be met.      You must meet one of the followin) You must meet one of the following

## 2021-03-08 NOTE — TELEPHONE ENCOUNTER
Medication PA requested:   Janice Camacho 629-84.5-36 MCG/INH     Dx Code: J33.9     Description of Diagnosis: Moderate persistent extrinsic asthma without complication         Key or Insurance Telephone #: [id number]     CPT Code: N/A     Case Number/Pendi

## 2021-03-09 NOTE — TELEPHONE ENCOUNTER
Patient needs Trelegy as she remains symptomatic in spite of previous ICS and LABA's. If this pharmacy cannot track down Trelegy we potentially try another pharmacy to see if they have it in stock as patient needs this medication .

## 2021-03-09 NOTE — TELEPHONE ENCOUNTER
[facility]up[facility]ent [facility] contacted at [phone], spoke with Care Coordinator, Tracey Holloway. Inquired of rep if patient's Dupixent Enrollment Form diagnosis can be changed to Nasal Polyps.      Coordinator states that a totally new [address] Enrollment Form

## 2021-03-09 NOTE — TELEPHONE ENCOUNTER
Dr. Pawel Troncoso, Mireya Aguila is on backorder, not available. Pharmacist is requesting a prescription for another inhaler. Please advise.        Fax received from [address] stating that       Mireya Aguila 939-18.0-58 MCG/INH Aerosol Powder does no

## 2021-03-09 NOTE — TELEPHONE ENCOUNTER
New Enrollment form for [address] under diagnosis of Nasal Polyps completed, Dr. Tracey Daly completed physician portion. Patient completed patient portion.      Patient aware that PA for Dupixent was denied for diagnosis of asthma, hence new enrollment fo

## 2021-03-09 NOTE — TELEPHONE ENCOUNTER
[facility] in [place] contacted. Pharmacist confirms that [facility] has Trelegy Ellipta 200 mcg in stock. Patient contacted via telephone.   Patient informed that [address] does not have Elsy Portal in [facility], but [employer]

## 2021-03-09 NOTE — TELEPHONE ENCOUNTER
Fax sent to Heather Muller at [phone] as below.       Copy of Dupixent PA denial under diagnosis of Asthma and new Dupixent [facility] Enrollment Form under new diagnosis Nasal Polyps with note on cover sheet stating that MAL friasial for Anuradhaen

## 2021-03-11 NOTE — TELEPHONE ENCOUNTER
Fax received from [place] [phone] confirming the receipt of the enrollment form. They will contact our office regarding the outcome of the inquiry, or if any additional information is required.

## 2021-03-11 NOTE — TELEPHONE ENCOUNTER
Dupixent-My-Way Benefit Investigation received for patient's Dupixent under diagnosis of Nasal Polyps. Covered Specialty Pharmacy is [facility].     Fax states that the provider should contact the payer directly at [phone] t

## 2021-03-16 NOTE — TELEPHONE ENCOUNTER
RN called Danielanne Lima at [phone] to confirm that they have received the new Enrollment Form for nasal polyps along with clinical notes and documentation.     Spoke with Tanika Blevins from [facility] and she reports that they have received the documentation on 3/12 and

## 2021-03-17 ENCOUNTER — TELEPHONE (OUTPATIENT)
Dept: ALLERGY | Facility: CLINIC | Age: 46
End: 2021-03-17

## 2021-03-18 NOTE — TELEPHONE ENCOUNTER
Copy of Telephone Encounter faxed to [facility] for completion of [address] [place]. Awaiting fax confirmation.

## 2021-03-19 ENCOUNTER — TELEPHONE (OUTPATIENT)
Dept: ALLERGY | Facility: CLINIC | Age: 46
End: 2021-03-19

## 2021-03-19 NOTE — TELEPHONE ENCOUNTER
Heather Boateng [address] contacted. Spoke with pharmacist Zena, who asked if patient would need a loading dose of Dupixent.      Pharmacist informed that patient will not need a loading dose of Dupixent and confirmed with pharmacist that [address] is cookie

## 2021-03-19 NOTE — TELEPHONE ENCOUNTER
Sarah Bradshaw is requesting to clarify the diagnosis for the medication.     Dupixent 300MG/2ML Syringes

## 2021-03-22 NOTE — TELEPHONE ENCOUNTER
Fax confirmation received reporting successful transmission. Awaiting response from [facility].

## 2021-03-22 NOTE — TELEPHONE ENCOUNTER
Fax from [employer] received reporting PA approval for     Dupixent 300 mg/2 mL prefilled syringe. Dates: 3/16/2021 - 9/16/2021. Case #: Corey Mckeon document faxed to Heather Muller at [phone].      Awaiting fax confirm

## 2021-03-25 NOTE — TELEPHONE ENCOUNTER
Dr. Bob Burton, please advise. Okay for patient to continue Dupixent Injection at home without nurse education appointment as she is continuing [address]? See below. Patient's [address] is scheduled to be delivered to her home on 3/27/2021.

## 2021-03-25 NOTE — TELEPHONE ENCOUNTER
Heather Muller contacted at [phone], spoke with pharmacy , Weston Gomez. Informed by rep that cost of Dupixent through [facility] is $100.00 without copayment assistance.      [facility] Esquivel states that are waiting for patient to

## 2021-03-26 NOTE — TELEPHONE ENCOUNTER
RN contacted Heather Muller to notify them that patient would like to cancel RX as she is going to receive Dupixent through [employer] instead. Left voicemail for patient to notify her that we have updated [facility].   Also notified her that she

## 2021-03-26 NOTE — TELEPHONE ENCOUNTER
Yes ok for pt to continue dupixant at home.  Pt was in office last month and we provided teaching at last visit

## 2021-03-31 ENCOUNTER — OFFICE VISIT (OUTPATIENT)
Dept: INTERNAL MEDICINE CLINIC | Facility: CLINIC | Age: 46
End: 2021-03-31

## 2021-03-31 VITALS
DIASTOLIC BLOOD PRESSURE: 76 MMHG | OXYGEN SATURATION: 98 % | RESPIRATION RATE: 16 BRPM | SYSTOLIC BLOOD PRESSURE: 100 MMHG | WEIGHT: 183 LBS | BODY MASS INDEX: 31.24 KG/M2 | HEART RATE: 101 BPM | HEIGHT: 64 IN

## 2021-03-31 DIAGNOSIS — E66.3 OVERWEIGHT (BMI 25.0-29.9): ICD-10-CM

## 2021-03-31 DIAGNOSIS — Z15.89 HETEROZYGOUS MTHFR MUTATION C677T: ICD-10-CM

## 2021-03-31 DIAGNOSIS — Z51.81 ENCOUNTER FOR THERAPEUTIC DRUG MONITORING: Primary | ICD-10-CM

## 2021-03-31 PROCEDURE — 3008F BODY MASS INDEX DOCD: CPT | Performed by: INTERNAL MEDICINE

## 2021-03-31 PROCEDURE — 3078F DIAST BP <80 MM HG: CPT | Performed by: INTERNAL MEDICINE

## 2021-03-31 PROCEDURE — 99213 OFFICE O/P EST LOW 20 MIN: CPT | Performed by: INTERNAL MEDICINE

## 2021-03-31 PROCEDURE — 3074F SYST BP LT 130 MM HG: CPT | Performed by: INTERNAL MEDICINE

## 2021-03-31 RX ORDER — DUPILUMAB 300 MG/2ML
INJECTION, SOLUTION SUBCUTANEOUS
COMMUNITY
Start: 2021-03-29 | End: 2021-09-02

## 2021-03-31 RX ORDER — LIRAGLUTIDE 6 MG/ML
3 INJECTION, SOLUTION SUBCUTANEOUS DAILY
Qty: 1 PEN | Refills: 0 | COMMUNITY
Start: 2021-03-31 | End: 2021-07-13

## 2021-03-31 NOTE — PROGRESS NOTES
Patient teaching on [address] [place] [address] [facility] performed. Patient aware of the directions of how to titrate the dose on this medication.    Start therapy: Week 1- 0.6mg daily   Week 2- 1.2mg daily   Week 3- 1.8mg daily   Week 4- 2.4mg daily   Week 5- 3mg daily   Patient shelby

## 2021-03-31 NOTE — PROGRESS NOTES
HISTORY OF PRESENT ILLNESS  Patient presents with:  Weight Check: up 8 lbs      Jake Ivan is a 54year old female here for follow up in medical weight loss program.     Here for follow up   Up 8 lb   Struggling with eating  Works at home  Up 8 lb   Star 3.5 08/31/2020    GLOBULT 2.5 01/17/2015    GLOBULIN 3.6 08/31/2020    ALBGLOBRAT 1.7 01/17/2015     08/31/2020    K 3.6 08/31/2020     08/31/2020    CO2 25.0 08/31/2020     Lab Results   Component Value Date    EAG 85 08/31/2020    A1C 4.6 08/ sulfate (2.5 MG/3ML) 0.083% Inhalation Nebu Soln, Take 3 mL (2.5 mg total) by nebulization every 4 (four) hours as needed for Wheezing., Disp: 1 Box, Rfl: 1  Tiotropium Bromide Monohydrate (SPIRIVA RESPIMAT) 2.5 MCG/ACT Inhalation Aero Soln, Inhale 2.5 mcg year and lack of response to medication.  Needs to return to basics of tracking and exercise   · No response  trial of phendimetrazine   · -advised of side effects and adverse effects of this medication  · Continue bupropion, stress is doing much better  ·

## 2021-04-01 RX ORDER — LIRAGLUTIDE 6 MG/ML
3 INJECTION, SOLUTION SUBCUTANEOUS DAILY
Qty: 5 PEN | Refills: 0 | Status: SHIPPED | OUTPATIENT
Start: 2021-04-01 | End: 2021-07-13

## 2021-04-01 RX ORDER — PEN NEEDLE, DIABETIC 30 GX3/16"
1 NEEDLE, DISPOSABLE MISCELLANEOUS DAILY
Qty: 100 EACH | Refills: 1 | Status: SHIPPED | OUTPATIENT
Start: 2021-04-01

## 2021-04-09 ENCOUNTER — TELEPHONE (OUTPATIENT)
Dept: INTERNAL MEDICINE CLINIC | Facility: CLINIC | Age: 46
End: 2021-04-09

## 2021-04-09 NOTE — TELEPHONE ENCOUNTER
MAL Isaac requested by [place] on [address] (Key: [id number])  Entered and awaiting decision

## 2021-04-30 ENCOUNTER — LAB ENCOUNTER (OUTPATIENT)
Dept: LAB | Age: 46
End: 2021-04-30
Attending: FAMILY MEDICINE
Payer: COMMERCIAL

## 2021-04-30 DIAGNOSIS — E03.9 HYPOTHYROIDISM, UNSPECIFIED TYPE: ICD-10-CM

## 2021-04-30 PROCEDURE — 84439 ASSAY OF FREE THYROXINE: CPT

## 2021-04-30 PROCEDURE — 36415 COLL VENOUS BLD VENIPUNCTURE: CPT

## 2021-04-30 PROCEDURE — 84443 ASSAY THYROID STIM HORMONE: CPT

## 2021-05-04 ENCOUNTER — TELEPHONE (OUTPATIENT)
Dept: FAMILY MEDICINE CLINIC | Facility: CLINIC | Age: 46
End: 2021-05-04

## 2021-05-04 DIAGNOSIS — Z51.81 ENCOUNTER FOR MEDICATION MONITORING: ICD-10-CM

## 2021-05-04 DIAGNOSIS — E03.9 HYPOTHYROIDISM, UNSPECIFIED TYPE: Primary | ICD-10-CM

## 2021-05-04 RX ORDER — LEVOTHYROXINE SODIUM 137 UG/1
137 TABLET ORAL
Qty: 90 TABLET | Refills: 0 | Status: SHIPPED | OUTPATIENT
Start: 2021-05-04 | End: 2021-07-22

## 2021-05-11 ENCOUNTER — MED REC SCAN ONLY (OUTPATIENT)
Dept: FAMILY MEDICINE CLINIC | Facility: CLINIC | Age: 46
End: 2021-05-11

## 2021-07-12 ENCOUNTER — TELEMEDICINE (OUTPATIENT)
Dept: ALLERGY | Facility: CLINIC | Age: 46
End: 2021-07-12

## 2021-07-12 ENCOUNTER — TELEPHONE (OUTPATIENT)
Dept: ALLERGY | Facility: CLINIC | Age: 46
End: 2021-07-12

## 2021-07-12 DIAGNOSIS — J32.2 CHRONIC ETHMOIDAL SINUSITIS: Primary | ICD-10-CM

## 2021-07-12 DIAGNOSIS — J33.9 NASAL POLYP: ICD-10-CM

## 2021-07-12 DIAGNOSIS — J45.40 MODERATE PERSISTENT EXTRINSIC ASTHMA WITHOUT COMPLICATION: ICD-10-CM

## 2021-07-12 PROCEDURE — 99214 OFFICE O/P EST MOD 30 MIN: CPT | Performed by: ALLERGY & IMMUNOLOGY

## 2021-07-12 NOTE — TELEPHONE ENCOUNTER
Left message for patient to call back. She has an appointment tonight at 7 pm. Dr Pawel Troncoso wanted to offer her a virtual appointment or telephone visit. She is still welcome to come in person if she would prefer. Will await call back from patient.

## 2021-07-13 ENCOUNTER — OFFICE VISIT (OUTPATIENT)
Dept: INTERNAL MEDICINE CLINIC | Facility: CLINIC | Age: 46
End: 2021-07-13

## 2021-07-13 VITALS
HEART RATE: 90 BPM | DIASTOLIC BLOOD PRESSURE: 78 MMHG | SYSTOLIC BLOOD PRESSURE: 110 MMHG | RESPIRATION RATE: 16 BRPM | HEIGHT: 64 IN | BODY MASS INDEX: 30.22 KG/M2 | WEIGHT: 177 LBS

## 2021-07-13 DIAGNOSIS — E66.3 OVERWEIGHT (BMI 25.0-29.9): ICD-10-CM

## 2021-07-13 DIAGNOSIS — Z51.81 ENCOUNTER FOR THERAPEUTIC DRUG MONITORING: Primary | ICD-10-CM

## 2021-07-13 PROCEDURE — 87591 N.GONORRHOEAE DNA AMP PROB: CPT | Performed by: NURSE PRACTITIONER

## 2021-07-13 PROCEDURE — 88175 CYTOPATH C/V AUTO FLUID REDO: CPT | Performed by: NURSE PRACTITIONER

## 2021-07-13 PROCEDURE — 87491 CHLMYD TRACH DNA AMP PROBE: CPT | Performed by: NURSE PRACTITIONER

## 2021-07-13 PROCEDURE — 3074F SYST BP LT 130 MM HG: CPT | Performed by: INTERNAL MEDICINE

## 2021-07-13 PROCEDURE — 87624 HPV HI-RISK TYP POOLED RSLT: CPT | Performed by: NURSE PRACTITIONER

## 2021-07-13 PROCEDURE — 99214 OFFICE O/P EST MOD 30 MIN: CPT | Performed by: INTERNAL MEDICINE

## 2021-07-13 PROCEDURE — 3078F DIAST BP <80 MM HG: CPT | Performed by: INTERNAL MEDICINE

## 2021-07-13 PROCEDURE — 3008F BODY MASS INDEX DOCD: CPT | Performed by: INTERNAL MEDICINE

## 2021-07-13 NOTE — PROGRESS NOTES
HISTORY OF PRESENT ILLNESS  Patient presents with:  Weight Check: down 6 lb      Maria R Galvan is a 54year old female here for follow up in medical weight loss program.     Here for follow up   Up 8 lb   Struggling with eating  Works at home     Started on d 08/31/2020    GLOBULT 2.5 01/17/2015    GLOBULIN 3.6 08/31/2020    ALBGLOBRAT 1.7 01/17/2015     08/31/2020    K 3.6 08/31/2020     08/31/2020    CO2 25.0 08/31/2020     Lab Results   Component Value Date    EAG 85 08/31/2020    A1C 4.6 08/31/2 lungs daily.  (Patient not taking: Reported on 7/13/2021 ), Disp: 3 Inhaler, Rfl: 0  Montelukast Sodium 10 MG Oral Tab, Take 1 tablet (10 mg total) by mouth nightly., Disp: 90 tablet, Rfl: 1  Ipratropium Bromide 0.02 % Inhalation Solution, USE 2.5 ML VIA NE reviewed limitations of medication management without lifestyle adjustment   · Nutrition: low carb diet/ recommended to eat breakfast daily/ regular protein intake  · Medication use and side effects reviewed with patient.   Medication contraindications: n/a

## 2021-07-13 NOTE — PROGRESS NOTES
Sina Schlatter is a 54year old female. HPI:   No chief complaint on file.     Patient is a 59-year-old female who presents for follow-up via video visit due to COVID-19 pandemic    This visit is conducted using Telemedicine with live, interactive video and try Dupixent before considering additional sinus surgeries  Patient would be on Dupixent also for underlying eosinophilic asthma  Dupixent sample given in office today without issue or incident  Awaiting approval from [address] through her insurance  Contin Comment: 1-2 drinks/wk    Drug use: No       Medications (Active prior to today's visit):  Current Outpatient Medications   Medication Sig Dispense Refill   • Levothyroxine Sodium (SYNTHROID) 137 MCG Oral Tab Take 137 mcg by mouth before breakfast. 90 tab topically 2 (two) times daily as needed. 60 g 3   • Cyanocobalamin (VITAMIN B-12 OR) Take by mouth.          Allergies:    Aspirin                 Tightness in Throat  Levaquin [Levofloxa*    RASH  Nsaids                    Penicillins                 Comme avoidance measures and potential treatment option of immunotherapy. Reviewed prior serum allergy testing showing sensitization to dogs more so than cat mouse.   Patient defers immunotherapy at this time  Continue with Zyrtec Singulair and intranasal steroi

## 2021-07-13 NOTE — PROGRESS NOTES
Patient teaching on [facility] performed. Patient demonstrated back, all questions were answered and patient verbalized understanding.  PRANEETH

## 2021-07-13 NOTE — PATIENT INSTRUCTIONS
1. Asthma  Improved in the interim with Dupixent. Patient currently on Trelegy and Singulair as well  No ED visits or prednisone in the interim.   Denies symptoms more than 2 days/week since starting Dupixent    Continue with above medications including Du

## 2021-07-14 RX ORDER — SEMAGLUTIDE 0.25 MG/.5ML
0.25 INJECTION, SOLUTION SUBCUTANEOUS WEEKLY
Qty: 2 ML | Refills: 0 | Status: SHIPPED | OUTPATIENT
Start: 2021-07-14 | End: 2021-08-05

## 2021-07-14 RX ORDER — SEMAGLUTIDE 0.5 MG/.5ML
0.5 INJECTION, SOLUTION SUBCUTANEOUS WEEKLY
Qty: 2 ML | Refills: 0 | Status: SHIPPED | OUTPATIENT
Start: 2021-07-14 | End: 2021-08-05

## 2021-07-18 DIAGNOSIS — J30.2 SEASONAL ALLERGIES: ICD-10-CM

## 2021-07-21 RX ORDER — CETIRIZINE HYDROCHLORIDE 10 MG/1
TABLET ORAL
Qty: 90 TABLET | Refills: 0 | Status: SHIPPED | OUTPATIENT
Start: 2021-07-21

## 2021-07-22 DIAGNOSIS — E03.9 HYPOTHYROIDISM, UNSPECIFIED TYPE: ICD-10-CM

## 2021-07-22 DIAGNOSIS — Z51.81 ENCOUNTER FOR MEDICATION MONITORING: ICD-10-CM

## 2021-07-22 RX ORDER — LEVOTHYROXINE SODIUM 137 UG/1
TABLET ORAL
Qty: 90 TABLET | Refills: 0 | Status: SHIPPED | OUTPATIENT
Start: 2021-07-22 | End: 2021-09-14

## 2021-07-22 RX ORDER — MONTELUKAST SODIUM 10 MG/1
TABLET ORAL
Qty: 90 TABLET | Refills: 0 | Status: SHIPPED | OUTPATIENT
Start: 2021-07-22 | End: 2021-12-29

## 2021-07-22 NOTE — TELEPHONE ENCOUNTER
Medication(s) to Refill:   Requested Prescriptions     Pending Prescriptions Disp Refills   • LEVOTHYROXINE SODIUM 137 MCG Oral Tab [Pharmacy Med Name: Levothyroxine Sodium 137 MCG Oral Tablet] 90 tablet 0     Sig: TAKE 1 TABLET BY MOUTH ONCE DAILY AT BEDT

## 2021-07-27 PROCEDURE — 88305 TISSUE EXAM BY PATHOLOGIST: CPT | Performed by: NURSE PRACTITIONER

## 2021-08-02 ENCOUNTER — PATIENT MESSAGE (OUTPATIENT)
Dept: ALLERGY | Facility: CLINIC | Age: 46
End: 2021-08-02

## 2021-08-02 RX ORDER — DUPILUMAB 300 MG/2ML
300 INJECTION, SOLUTION SUBCUTANEOUS
Qty: 2 EACH | Refills: 5 | Status: SHIPPED | OUTPATIENT
Start: 2021-08-02 | End: 2022-01-04

## 2021-08-02 NOTE — TELEPHONE ENCOUNTER
Patient last seen in Allergy 7/12/2021 (Telemedicine) for . . .    Chronic ethmoidal sinusitis  (primary encounter diagnosis)  Nasal polyp  Moderate persistent extrinsic asthma without complication       Patient requesting refill for  . . .     DUPIXENT 300

## 2021-08-02 NOTE — TELEPHONE ENCOUNTER
From: Graham Silva  To: Tej Charles MD  Sent: 8/2/2021 10:36 AM CDT  Subject: Prescription Question    Hi just wanted to let you know that I am on my last refill of Dupixent.     Thanks  Graham Silva

## 2021-08-03 PROBLEM — N93.8 DUB (DYSFUNCTIONAL UTERINE BLEEDING): Status: ACTIVE | Noted: 2021-08-03

## 2021-08-09 ENCOUNTER — PATIENT MESSAGE (OUTPATIENT)
Dept: INTERNAL MEDICINE CLINIC | Facility: CLINIC | Age: 46
End: 2021-08-09

## 2021-08-09 NOTE — TELEPHONE ENCOUNTER
Patient was last seen 7/13/21.   Finishing Wegovy 0.5 mg ready for next dose    9/2/2021  3:00 PM Sahil Zamora MD [facility]

## 2021-08-09 NOTE — TELEPHONE ENCOUNTER
From: Heather Friend  To: Alessio Farooq MD  Sent: 8/9/2021 11:51 AM CDT  Subject: Prescription Question    Hi I checked with the pharmacy and they don't have a refill online since I started with the .50 so I need a refill with the next dosage sent to them please.

## 2021-08-10 RX ORDER — SEMAGLUTIDE 1 MG/.5ML
1 INJECTION, SOLUTION SUBCUTANEOUS WEEKLY
Qty: 2 ML | Refills: 0 | Status: SHIPPED | OUTPATIENT
Start: 2021-08-10 | End: 2021-09-01

## 2021-08-16 DIAGNOSIS — J45.41 MODERATE PERSISTENT ASTHMA WITH EXACERBATION: ICD-10-CM

## 2021-08-17 DIAGNOSIS — J45.41 MODERATE PERSISTENT ASTHMA WITH EXACERBATION: ICD-10-CM

## 2021-08-18 RX ORDER — ALBUTEROL SULFATE 90 UG/1
AEROSOL, METERED RESPIRATORY (INHALATION)
Qty: 25.5 G | Refills: 0 | Status: SHIPPED | OUTPATIENT
Start: 2021-08-18 | End: 2021-12-27

## 2021-08-18 RX ORDER — ALBUTEROL SULFATE 90 UG/1
AEROSOL, METERED RESPIRATORY (INHALATION)
Qty: 18 G | Refills: 0 | OUTPATIENT
Start: 2021-08-18

## 2021-08-26 ENCOUNTER — TELEPHONE (OUTPATIENT)
Dept: ALLERGY | Facility: CLINIC | Age: 46
End: 2021-08-26

## 2021-08-26 NOTE — TELEPHONE ENCOUNTER
Medication PA requested:    Dupilumab ([place]) 300 MG/2ML Subcutaneous Solution Prefilled Syringe     Dx Code:     J33.9         Description of Diagnosis: Nasal polyp      Key or Insurance Telephone #:     [phone]    CPT Code:      Case Number/Pen

## 2021-08-27 NOTE — TELEPHONE ENCOUNTER
Medication PA Requested:   Dupilumab (DUPIXENT) 300 MG/2ML Subcutaneous Solution Prefilled Syringe                                                       CoverMyMeds Used:  Key:  Quantity:  Day Supply:  Sig:  Inject 2 mL (300 mg total) into the skin every 1

## 2021-08-30 NOTE — TELEPHONE ENCOUNTER
Pa approved.      Medication PA Requested:   Dupilumab (DUPIXENT) 300 MG/2ML Subcutaneous Solution Prefilled Syringe                                                       CoverMyMeds Used:  Key:  Quantity:  Day Supply:  Sig:  Inject 2 mL (300 mg total) into

## 2021-09-02 ENCOUNTER — OFFICE VISIT (OUTPATIENT)
Dept: FAMILY MEDICINE CLINIC | Facility: CLINIC | Age: 46
End: 2021-09-02

## 2021-09-02 ENCOUNTER — HOSPITAL ENCOUNTER (OUTPATIENT)
Dept: CT IMAGING | Age: 46
Discharge: HOME | End: 2021-09-02
Attending: ALLERGY & IMMUNOLOGY
Payer: COMMERCIAL

## 2021-09-02 ENCOUNTER — OFFICE VISIT (OUTPATIENT)
Dept: INTERNAL MEDICINE CLINIC | Facility: CLINIC | Age: 46
End: 2021-09-02

## 2021-09-02 VITALS
HEIGHT: 64 IN | TEMPERATURE: 99 F | HEART RATE: 94 BPM | WEIGHT: 171 LBS | DIASTOLIC BLOOD PRESSURE: 70 MMHG | SYSTOLIC BLOOD PRESSURE: 100 MMHG | RESPIRATION RATE: 18 BRPM | BODY MASS INDEX: 29.19 KG/M2

## 2021-09-02 VITALS
WEIGHT: 172 LBS | RESPIRATION RATE: 16 BRPM | TEMPERATURE: 97 F | OXYGEN SATURATION: 98 % | BODY MASS INDEX: 29.37 KG/M2 | DIASTOLIC BLOOD PRESSURE: 70 MMHG | HEIGHT: 64 IN | HEART RATE: 80 BPM | SYSTOLIC BLOOD PRESSURE: 122 MMHG

## 2021-09-02 DIAGNOSIS — Z00.00 ROUTINE GENERAL MEDICAL EXAMINATION AT A HEALTH CARE FACILITY: Primary | ICD-10-CM

## 2021-09-02 DIAGNOSIS — J33.9 NASAL POLYP: ICD-10-CM

## 2021-09-02 DIAGNOSIS — E66.3 OVERWEIGHT (BMI 25.0-29.9): ICD-10-CM

## 2021-09-02 DIAGNOSIS — Z12.11 COLON CANCER SCREENING: ICD-10-CM

## 2021-09-02 DIAGNOSIS — J32.2 CHRONIC ETHMOIDAL SINUSITIS: ICD-10-CM

## 2021-09-02 DIAGNOSIS — Z15.89 HETEROZYGOUS MTHFR MUTATION C677T: ICD-10-CM

## 2021-09-02 DIAGNOSIS — E03.9 HYPOTHYROIDISM, UNSPECIFIED TYPE: ICD-10-CM

## 2021-09-02 DIAGNOSIS — Z51.81 ENCOUNTER FOR THERAPEUTIC DRUG MONITORING: Primary | ICD-10-CM

## 2021-09-02 PROCEDURE — 3008F BODY MASS INDEX DOCD: CPT | Performed by: INTERNAL MEDICINE

## 2021-09-02 PROCEDURE — 3074F SYST BP LT 130 MM HG: CPT | Performed by: INTERNAL MEDICINE

## 2021-09-02 PROCEDURE — 3074F SYST BP LT 130 MM HG: CPT | Performed by: FAMILY MEDICINE

## 2021-09-02 PROCEDURE — 3078F DIAST BP <80 MM HG: CPT | Performed by: INTERNAL MEDICINE

## 2021-09-02 PROCEDURE — 99213 OFFICE O/P EST LOW 20 MIN: CPT | Performed by: INTERNAL MEDICINE

## 2021-09-02 PROCEDURE — 3078F DIAST BP <80 MM HG: CPT | Performed by: FAMILY MEDICINE

## 2021-09-02 PROCEDURE — 99396 PREV VISIT EST AGE 40-64: CPT | Performed by: FAMILY MEDICINE

## 2021-09-02 PROCEDURE — 70486 CT MAXILLOFACIAL W/O DYE: CPT | Performed by: ALLERGY & IMMUNOLOGY

## 2021-09-02 PROCEDURE — 3008F BODY MASS INDEX DOCD: CPT | Performed by: FAMILY MEDICINE

## 2021-09-02 RX ORDER — SEMAGLUTIDE 1.34 MG/ML
0.5 INJECTION, SOLUTION SUBCUTANEOUS WEEKLY
Qty: 1 EACH | Refills: 1 | Status: SHIPPED | OUTPATIENT
Start: 2021-09-02 | End: 2021-11-04

## 2021-09-02 RX ORDER — PHENTERMINE HYDROCHLORIDE 37.5 MG/1
1 TABLET ORAL DAILY
COMMUNITY
Start: 2021-05-07 | End: 2021-11-04

## 2021-09-02 NOTE — PROGRESS NOTES
HISTORY OF PRESENT ILLNESS  Patient presents with:  Weight Check: down 5 pounds      Silke Martínez is a 54year old female here for follow up in medical weight loss program.     Here for follow up   Was able down 5 lb  Tried wegovy and tolerated well   [facility] 08/31/2020    ALB 3.5 08/31/2020    GLOBULT 2.5 01/17/2015    GLOBULIN 3.6 08/31/2020    ALBGLOBRAT 1.7 01/17/2015     08/31/2020    K 3.6 08/31/2020     08/31/2020    CO2 25.0 08/31/2020     Lab Results   Component Value Date    EAG 85 08/31/2 0.083% Inhalation Nebu Soln, Take 3 mL (2.5 mg total) by nebulization every 4 (four) hours as needed for Wheezing., Disp: 1 Box, Rfl: 1  Ipratropium Bromide 0.02 % Inhalation Solution, USE 2.5 ML VIA NEBULIZER TWICE DAILY, Disp: 62.5 mL, Rfl: 0  triamcinol success. See patient instruction below for more details.   · Discussed strategies to overcome barriers to successful weight loss and weight maintenance  · FITTE: ACSM recommendations (150-300 minutes/ week in active weight loss)    There are no Patient Inst

## 2021-09-02 NOTE — PROGRESS NOTES
HPI:   Joel Grullon is a 54year old female who presents for a complete physical exam. Symptoms: denies discharge, itching, burning or dysuria. Patient complains of nothing today. Has asthma that is well controlled. Asthma managed by pulmonology.   Denies a mouth daily.      • ALBUTEROL SULFATE  (90 Base) MCG/ACT Inhalation Aero Soln INHALE 2 PUFFS INTO THE LUNGS EVERY 4 HOURS AS NEEDED FOR WHEEZING 25.5 g 0   • Dupilumab (DUPIXENT) 300 MG/2ML Subcutaneous Solution Prefilled Syringe Inject 2 mL (300 mg History   Problem Relation Age of Onset   • Cancer Mother         Brain   • Cancer Maternal Grandmother         stomach cancer?    • Diabetes Maternal Grandmother    • Diabetes Maternal Grandfather    • Heart Disorder Maternal Grandfather    • Cancer Matern normal optic disk,conjunctiva are clear  NECK: supple,no adenopathy  BREAST:DEFERRED TO GYNE  LUNGS: clear to auscultation; no rhonchi, rales, or wheezing  CARDIO: RRR without murmur  GI: good BS's,no masses, HSM or tenderness  :DEFERRED TO GYNE   MUSCUL

## 2021-09-11 ENCOUNTER — LAB ENCOUNTER (OUTPATIENT)
Dept: LAB | Age: 46
End: 2021-09-11
Attending: FAMILY MEDICINE
Payer: COMMERCIAL

## 2021-09-11 DIAGNOSIS — N93.8 DYSFUNCTIONAL UTERINE BLEEDING: ICD-10-CM

## 2021-09-11 DIAGNOSIS — Z00.00 ROUTINE GENERAL MEDICAL EXAMINATION AT A HEALTH CARE FACILITY: ICD-10-CM

## 2021-09-11 DIAGNOSIS — Z51.81 ENCOUNTER FOR MEDICATION MONITORING: ICD-10-CM

## 2021-09-11 DIAGNOSIS — E03.9 HYPOTHYROIDISM, UNSPECIFIED TYPE: ICD-10-CM

## 2021-09-11 LAB
ALBUMIN SERPL-MCNC: 3.8 G/DL (ref 3.4–5)
ALBUMIN/GLOB SERPL: 1.1 {RATIO} (ref 1–2)
ALP LIVER SERPL-CCNC: 61 U/L
ALT SERPL-CCNC: 17 U/L
ANION GAP SERPL CALC-SCNC: 5 MMOL/L (ref 0–18)
AST SERPL-CCNC: 13 U/L (ref 15–37)
BASOPHILS # BLD AUTO: 0.11 X10(3) UL (ref 0–0.2)
BASOPHILS NFR BLD AUTO: 1.4 %
BILIRUB SERPL-MCNC: 0.6 MG/DL (ref 0.1–2)
BILIRUB UR QL STRIP.AUTO: NEGATIVE
BUN BLD-MCNC: 8 MG/DL (ref 7–18)
CALCIUM BLD-MCNC: 8.6 MG/DL (ref 8.5–10.1)
CHLORIDE SERPL-SCNC: 114 MMOL/L (ref 98–112)
CHOLEST SMN-MCNC: 140 MG/DL (ref ?–200)
CLARITY UR REFRACT.AUTO: CLEAR
CO2 SERPL-SCNC: 22 MMOL/L (ref 21–32)
COLOR UR AUTO: YELLOW
CREAT BLD-MCNC: 0.74 MG/DL
EOSINOPHIL # BLD AUTO: 1.54 X10(3) UL (ref 0–0.7)
EOSINOPHIL NFR BLD AUTO: 20.1 %
ERYTHROCYTE [DISTWIDTH] IN BLOOD BY AUTOMATED COUNT: 13 %
ESTRADIOL SERPL-MCNC: 37.6 PG/ML
FSH SERPL-ACNC: 11.7 MIU/ML
GLOBULIN PLAS-MCNC: 3.4 G/DL (ref 2.8–4.4)
GLUCOSE BLD-MCNC: 72 MG/DL (ref 70–99)
GLUCOSE UR STRIP.AUTO-MCNC: NEGATIVE MG/DL
HCT VFR BLD AUTO: 42.2 %
HDLC SERPL-MCNC: 47 MG/DL (ref 40–59)
HGB BLD-MCNC: 13.9 G/DL
IMM GRANULOCYTES # BLD AUTO: 0.02 X10(3) UL (ref 0–1)
IMM GRANULOCYTES NFR BLD: 0.3 %
KETONES UR STRIP.AUTO-MCNC: NEGATIVE MG/DL
LDLC SERPL CALC-MCNC: 77 MG/DL (ref ?–100)
LEUKOCYTE ESTERASE UR QL STRIP.AUTO: NEGATIVE
LH SERPL-ACNC: 3.8 MIU/ML
LYMPHOCYTES # BLD AUTO: 1.71 X10(3) UL (ref 1–4)
LYMPHOCYTES NFR BLD AUTO: 22.4 %
M PROTEIN MFR SERPL ELPH: 7.2 G/DL (ref 6.4–8.2)
MCH RBC QN AUTO: 29.9 PG (ref 26–34)
MCHC RBC AUTO-ENTMCNC: 32.9 G/DL (ref 31–37)
MCV RBC AUTO: 90.8 FL
MONOCYTES # BLD AUTO: 0.48 X10(3) UL (ref 0.1–1)
MONOCYTES NFR BLD AUTO: 6.3 %
NEUTROPHILS # BLD AUTO: 3.79 X10 (3) UL (ref 1.5–7.7)
NEUTROPHILS # BLD AUTO: 3.79 X10(3) UL (ref 1.5–7.7)
NEUTROPHILS NFR BLD AUTO: 49.5 %
NITRITE UR QL STRIP.AUTO: NEGATIVE
NONHDLC SERPL-MCNC: 93 MG/DL (ref ?–130)
OSMOLALITY SERPL CALC.SUM OF ELEC: 289 MOSM/KG (ref 275–295)
PATIENT FASTING Y/N/NP: YES
PATIENT FASTING Y/N/NP: YES
PH UR STRIP.AUTO: 5 [PH] (ref 5–8)
PLATELET # BLD AUTO: 224 10(3)UL (ref 150–450)
POTASSIUM SERPL-SCNC: 3.6 MMOL/L (ref 3.5–5.1)
PROT UR STRIP.AUTO-MCNC: NEGATIVE MG/DL
RBC # BLD AUTO: 4.65 X10(6)UL
SODIUM SERPL-SCNC: 141 MMOL/L (ref 136–145)
SP GR UR STRIP.AUTO: 1.02 (ref 1–1.03)
T4 FREE SERPL-MCNC: 0.8 NG/DL (ref 0.8–1.7)
TRIGL SERPL-MCNC: 80 MG/DL (ref 30–149)
TSI SER-ACNC: 3.63 MIU/ML (ref 0.36–3.74)
UROBILINOGEN UR STRIP.AUTO-MCNC: <2 MG/DL
VLDLC SERPL CALC-MCNC: 12 MG/DL (ref 0–30)
WBC # BLD AUTO: 7.7 X10(3) UL (ref 4–11)

## 2021-09-11 PROCEDURE — 36415 COLL VENOUS BLD VENIPUNCTURE: CPT

## 2021-09-11 PROCEDURE — 83002 ASSAY OF GONADOTROPIN (LH): CPT

## 2021-09-11 PROCEDURE — 80053 COMPREHEN METABOLIC PANEL: CPT

## 2021-09-11 PROCEDURE — 81001 URINALYSIS AUTO W/SCOPE: CPT

## 2021-09-11 PROCEDURE — 85025 COMPLETE CBC W/AUTO DIFF WBC: CPT

## 2021-09-11 PROCEDURE — 84443 ASSAY THYROID STIM HORMONE: CPT

## 2021-09-11 PROCEDURE — 82670 ASSAY OF TOTAL ESTRADIOL: CPT

## 2021-09-11 PROCEDURE — 84439 ASSAY OF FREE THYROXINE: CPT

## 2021-09-11 PROCEDURE — 80061 LIPID PANEL: CPT

## 2021-09-11 PROCEDURE — 83001 ASSAY OF GONADOTROPIN (FSH): CPT

## 2021-09-14 DIAGNOSIS — Z51.81 ENCOUNTER FOR MEDICATION MONITORING: ICD-10-CM

## 2021-09-14 DIAGNOSIS — E03.9 HYPOTHYROIDISM, UNSPECIFIED TYPE: ICD-10-CM

## 2021-09-14 RX ORDER — LEVOTHYROXINE SODIUM 137 UG/1
137 TABLET ORAL NIGHTLY
Qty: 90 TABLET | Refills: 0 | Status: SHIPPED | OUTPATIENT
Start: 2021-09-21 | End: 2022-02-01

## 2021-09-19 DIAGNOSIS — J30.2 SEASONAL ALLERGIES: ICD-10-CM

## 2021-09-21 RX ORDER — CETIRIZINE HYDROCHLORIDE 10 MG/1
TABLET ORAL
Qty: 90 TABLET | Refills: 0 | OUTPATIENT
Start: 2021-09-21

## 2021-09-22 ENCOUNTER — TELEPHONE (OUTPATIENT)
Dept: ALLERGY | Facility: CLINIC | Age: 46
End: 2021-09-22

## 2021-09-22 NOTE — TELEPHONE ENCOUNTER
Patient reports a syringe malfunction with her recent dose of dupxient. She does not think any medication made it in her system. Most of it shot out of the syringe after it was removed from her arm. Patient does have one more syringe from the two pack.

## 2021-09-25 NOTE — TELEPHONE ENCOUNTER
Patient picked up [employer] Sample     Sample information   Dupixent 300 mg/2 ml syringe  Lot: Brittnee Mckee  Expiration 01/2024      Patient given printed copy of [employer] Education and copy of document sent through [address]       Sample dispensed logged in Sample

## 2021-10-04 ENCOUNTER — OFFICE VISIT (OUTPATIENT)
Dept: FAMILY MEDICINE CLINIC | Facility: CLINIC | Age: 46
End: 2021-10-04

## 2021-10-04 VITALS
HEIGHT: 64 IN | DIASTOLIC BLOOD PRESSURE: 80 MMHG | WEIGHT: 174 LBS | SYSTOLIC BLOOD PRESSURE: 100 MMHG | HEART RATE: 80 BPM | TEMPERATURE: 98 F | BODY MASS INDEX: 29.71 KG/M2 | RESPIRATION RATE: 16 BRPM

## 2021-10-04 DIAGNOSIS — D32.9 MENINGIOMA (HCC): Primary | ICD-10-CM

## 2021-10-04 PROCEDURE — 99213 OFFICE O/P EST LOW 20 MIN: CPT | Performed by: FAMILY MEDICINE

## 2021-10-04 PROCEDURE — 3074F SYST BP LT 130 MM HG: CPT | Performed by: FAMILY MEDICINE

## 2021-10-04 PROCEDURE — 3008F BODY MASS INDEX DOCD: CPT | Performed by: FAMILY MEDICINE

## 2021-10-04 PROCEDURE — 3079F DIAST BP 80-89 MM HG: CPT | Performed by: FAMILY MEDICINE

## 2021-10-05 NOTE — PROGRESS NOTES
[facility] Family Medicine Office Note  Chief Complaint:   Patient presents with:  Lab Results: CT scan results      HPI:   This is a 54year old female coming in for possible meningioma.   Patient had a CT scan of the sinuses done recently and an tablet by mouth daily. • OZEMPIC, 0.25 OR 0.5 MG/DOSE, 2 MG/1.5ML Subcutaneous Solution Pen-injector Inject 0.5 mg into the skin once a week.  1 each 1   • ALBUTEROL SULFATE  (90 Base) MCG/ACT Inhalation Aero Soln INHALE 2 PUFFS INTO THE LUNGS EV cough  GASTROINTESTINAL:  Denies any abdominal pain, nausea, vomiting  NEUROLOGICAL:  Denies headache, dizziness, syncope, numbness or tingling in the extremities. MUSCULOSKELETAL:  Denies muscle, back pain, joint pain or stiffness.     EXAM:   /80 ( done.   Outcome: Patient verbalizes understanding. Patient is notified to call with any questions, complications, allergies, or worsening or changing symptoms.   Patient is to call with any side effects or complications from the treatments as a result of to

## 2021-10-19 ENCOUNTER — HOSPITAL ENCOUNTER (OUTPATIENT)
Dept: MRI IMAGING | Age: 46
Discharge: HOME | End: 2021-10-19
Attending: FAMILY MEDICINE
Payer: COMMERCIAL

## 2021-10-19 DIAGNOSIS — D32.9 MENINGIOMA (HCC): ICD-10-CM

## 2021-10-19 PROCEDURE — 70553 MRI BRAIN STEM W/O & W/DYE: CPT | Performed by: FAMILY MEDICINE

## 2021-10-20 DIAGNOSIS — D32.9 MENINGIOMA (HCC): Primary | ICD-10-CM

## 2021-10-28 ENCOUNTER — MED REC SCAN ONLY (OUTPATIENT)
Dept: FAMILY MEDICINE CLINIC | Facility: CLINIC | Age: 46
End: 2021-10-28

## 2021-11-01 ENCOUNTER — OFFICE VISIT (OUTPATIENT)
Dept: FAMILY MEDICINE CLINIC | Facility: CLINIC | Age: 46
End: 2021-11-01

## 2021-11-01 VITALS
HEART RATE: 92 BPM | WEIGHT: 164 LBS | HEIGHT: 63 IN | DIASTOLIC BLOOD PRESSURE: 70 MMHG | RESPIRATION RATE: 16 BRPM | BODY MASS INDEX: 29.06 KG/M2 | SYSTOLIC BLOOD PRESSURE: 108 MMHG | TEMPERATURE: 99 F

## 2021-11-01 DIAGNOSIS — J01.01 ACUTE RECURRENT MAXILLARY SINUSITIS: Primary | ICD-10-CM

## 2021-11-01 PROCEDURE — 3074F SYST BP LT 130 MM HG: CPT | Performed by: FAMILY MEDICINE

## 2021-11-01 PROCEDURE — 3008F BODY MASS INDEX DOCD: CPT | Performed by: FAMILY MEDICINE

## 2021-11-01 PROCEDURE — 3078F DIAST BP <80 MM HG: CPT | Performed by: FAMILY MEDICINE

## 2021-11-01 PROCEDURE — 99214 OFFICE O/P EST MOD 30 MIN: CPT | Performed by: FAMILY MEDICINE

## 2021-11-01 RX ORDER — METRONIDAZOLE 7.5 MG/G
1 GEL VAGINAL NIGHTLY
COMMUNITY
Start: 2021-10-26 | End: 2021-11-16

## 2021-11-01 RX ORDER — PREDNISONE 20 MG/1
TABLET ORAL
Qty: 20 TABLET | Refills: 0 | Status: SHIPPED | OUTPATIENT
Start: 2021-11-01 | End: 2021-11-16

## 2021-11-01 RX ORDER — KETOROLAC TROMETHAMINE 5 MG/ML
1 SOLUTION OPHTHALMIC DAILY
COMMUNITY
Start: 2021-10-26 | End: 2021-11-16

## 2021-11-01 RX ORDER — DOXYCYCLINE HYCLATE 100 MG
100 TABLET ORAL 2 TIMES DAILY
Qty: 26 TABLET | Refills: 0 | Status: SHIPPED | OUTPATIENT
Start: 2021-11-01 | End: 2021-11-16

## 2021-11-01 NOTE — PROGRESS NOTES
HPI:   Joel Grullon is a 54year old female who presents for upper respiratory symptoms for  7-10 days. Patient reports sore throat only at the beginning of sx's, congestion, dry cough, sinus pain, prior history of sinusitis, headaches, denies fever.     Gustavo Crenshaw Aerosol Powder, Breath Activated Inhale 1 puff into the lungs daily. 3 each 1   • albuterol sulfate (2.5 MG/3ML) 0.083% Inhalation Nebu Soln Take 3 mL (2.5 mg total) by nebulization every 4 (four) hours as needed for Wheezing.  1 Box 1   • Ipratropium Bromi Temp 99.2 °F (37.3 °C) (Oral)   Resp 16   Ht 5' 3\" (1.6 m)   Wt 164 lb (74.4 kg)   LMP 07/23/2021   BMI 29.05 kg/m²   GENERAL: well developed, well nourished,in no apparent distress  SKIN: no rashes,no suspicious lesions  EYES:PERRL, EOMI,conjunctiva are

## 2021-11-04 ENCOUNTER — OFFICE VISIT (OUTPATIENT)
Dept: INTERNAL MEDICINE CLINIC | Facility: CLINIC | Age: 46
End: 2021-11-04

## 2021-11-04 VITALS
SYSTOLIC BLOOD PRESSURE: 110 MMHG | RESPIRATION RATE: 16 BRPM | BODY MASS INDEX: 28.51 KG/M2 | HEIGHT: 64 IN | WEIGHT: 167 LBS | HEART RATE: 78 BPM | DIASTOLIC BLOOD PRESSURE: 78 MMHG

## 2021-11-04 DIAGNOSIS — Z51.81 ENCOUNTER FOR THERAPEUTIC DRUG MONITORING: Primary | ICD-10-CM

## 2021-11-04 DIAGNOSIS — E66.3 OVERWEIGHT (BMI 25.0-29.9): ICD-10-CM

## 2021-11-04 PROCEDURE — 3078F DIAST BP <80 MM HG: CPT | Performed by: INTERNAL MEDICINE

## 2021-11-04 PROCEDURE — 99213 OFFICE O/P EST LOW 20 MIN: CPT | Performed by: INTERNAL MEDICINE

## 2021-11-04 PROCEDURE — 3008F BODY MASS INDEX DOCD: CPT | Performed by: INTERNAL MEDICINE

## 2021-11-04 PROCEDURE — 3074F SYST BP LT 130 MM HG: CPT | Performed by: INTERNAL MEDICINE

## 2021-11-04 RX ORDER — SEMAGLUTIDE 1.34 MG/ML
0.5 INJECTION, SOLUTION SUBCUTANEOUS WEEKLY
Qty: 1 EACH | Refills: 0 | Status: SHIPPED | OUTPATIENT
Start: 2021-11-04 | End: 2022-01-26

## 2021-11-04 RX ORDER — SEMAGLUTIDE 1.34 MG/ML
1 INJECTION, SOLUTION SUBCUTANEOUS WEEKLY
Qty: 3 EACH | Refills: 0 | Status: SHIPPED | OUTPATIENT
Start: 2021-11-04 | End: 2022-01-26

## 2021-11-04 NOTE — PROGRESS NOTES
HISTORY OF PRESENT ILLNESS  Patient presents with:  Weight Check: down 5 lb      Maria R Galvan is a 54year old female here for follow up in medical weight loss program.     Here for follow up   Was able down 5 lb   Has 5 more days of medication   Doing ozemp 7.2 09/11/2021    ALB 3.8 09/11/2021    GLOBULT 2.5 01/17/2015    GLOBULIN 3.4 09/11/2021    ALBGLOBRAT 1.7 01/17/2015     09/11/2021    K 3.6 09/11/2021     (H) 09/11/2021    CO2 22.0 09/11/2021     Lab Results   Component Value Date    EAG 85 (100 mg total) by mouth 2 (two) times daily. , Disp: 180 tablet, Rfl: 0  buPROPion HCl ER, SR, 150 MG Oral Tablet 12 Hr, TAKE 1 TABLET(150 MG) BY MOUTH TWICE DAILY, Disp: 180 tablet, Rfl: 0  Insulin Pen Needle (PEN NEEDLES) 32G X 4 MM Does not apply Misc, I reviewed limitations of medication management without lifestyle adjustment   · Nutrition: low carb diet/ recommended to eat breakfast daily/ regular protein intake  · Medication use and side effects reviewed with patient.   Medication contraindications: n/a

## 2021-11-16 ENCOUNTER — NURSE ONLY (OUTPATIENT)
Dept: HEMATOLOGY/ONCOLOGY | Facility: HOSPITAL | Age: 46
End: 2021-11-16
Attending: NEUROLOGICAL SURGERY
Payer: COMMERCIAL

## 2021-11-16 ENCOUNTER — OFFICE VISIT (OUTPATIENT)
Dept: NEUROLOGY | Facility: CLINIC | Age: 46
End: 2021-11-16

## 2021-11-16 VITALS
WEIGHT: 168 LBS | RESPIRATION RATE: 16 BRPM | HEART RATE: 95 BPM | DIASTOLIC BLOOD PRESSURE: 76 MMHG | SYSTOLIC BLOOD PRESSURE: 113 MMHG | BODY MASS INDEX: 28.68 KG/M2 | OXYGEN SATURATION: 96 % | TEMPERATURE: 98 F | HEIGHT: 64.02 IN

## 2021-11-16 DIAGNOSIS — D32.9 MENINGIOMA (HCC): Primary | ICD-10-CM

## 2021-11-16 PROCEDURE — 99211 OFF/OP EST MAY X REQ PHY/QHP: CPT

## 2021-11-16 PROCEDURE — 99203 OFFICE O/P NEW LOW 30 MIN: CPT | Performed by: PHYSICIAN ASSISTANT

## 2021-11-16 NOTE — H&P
Neurosurgery Clinic Visit  2021    Yusuf Cole PCP:  Avril DURAN DO    1975 MRN [id number]       CC:  Meningioma    HPI:    Lei Meléndez is a very pleasant 54year old female who presents for evaluation of meningioma.   She is being worked up Yes    Family History   Problem Relation Age of Onset   • Cancer Mother         Brain   • Cancer Maternal Grandmother         stomach cancer?    • Diabetes Maternal Grandmother    • Diabetes Maternal Grandfather    • Heart Disorder Maternal Grandfather    • incidental meningioma with roughly 50% growth in 10 years, however measurements are subject to differences in technique. She is asymptomatic from this lesion. Plan to repeat MRI Brain w/ and w/o in 6 months.     Imaging was reviewed with the patient and e

## 2021-12-13 ENCOUNTER — TELEPHONE (OUTPATIENT)
Dept: ALLERGY | Facility: CLINIC | Age: 46
End: 2021-12-13

## 2021-12-13 NOTE — TELEPHONE ENCOUNTER
RN calling to notify pt that she is due for her routine Dupixent follow up in January. Left voicemail to notify pt. Provided call back number.

## 2021-12-24 DIAGNOSIS — J45.41 MODERATE PERSISTENT ASTHMA WITH EXACERBATION: ICD-10-CM

## 2021-12-27 RX ORDER — ALBUTEROL SULFATE 90 UG/1
AEROSOL, METERED RESPIRATORY (INHALATION)
Qty: 54 G | Refills: 0 | Status: SHIPPED | OUTPATIENT
Start: 2021-12-27

## 2021-12-29 ENCOUNTER — PATIENT MESSAGE (OUTPATIENT)
Dept: FAMILY MEDICINE CLINIC | Facility: CLINIC | Age: 46
End: 2021-12-29

## 2021-12-29 RX ORDER — MONTELUKAST SODIUM 10 MG/1
10 TABLET ORAL NIGHTLY
Qty: 90 TABLET | Refills: 0 | Status: SHIPPED | OUTPATIENT
Start: 2021-12-29

## 2021-12-29 NOTE — TELEPHONE ENCOUNTER
From: Otilio Tapia  To: KIRA DURAN, DO  Sent: 12/29/2021 1:49 PM CST  Subject: Singular    Hi I am out of refills for Singular and I don’t show it in my history for [facility] where I can request them sending you a refill request.    Otilio Tapia

## 2021-12-29 NOTE — TELEPHONE ENCOUNTER
LOV: 11/1/21  for: sinusitis  Patient advised to RTC on:  Prn    MONTELUKAST SODIUM 10 MG Oral Tab 90 tablet 0 7/22/2021     Sig: TAKE 1 TABLET BY MOUTH ONCE DAILY AT NIGHT

## 2021-12-30 ENCOUNTER — APPOINTMENT (OUTPATIENT)
Dept: GENERAL RADIOLOGY | Age: 46
End: 2021-12-30
Attending: EMERGENCY MEDICINE
Payer: COMMERCIAL

## 2021-12-30 ENCOUNTER — TELEPHONE (OUTPATIENT)
Dept: FAMILY MEDICINE CLINIC | Facility: CLINIC | Age: 46
End: 2021-12-30

## 2021-12-30 ENCOUNTER — HOSPITAL ENCOUNTER (OUTPATIENT)
Age: 46
Discharge: HOME | End: 2021-12-30
Attending: EMERGENCY MEDICINE
Payer: COMMERCIAL

## 2021-12-30 VITALS
HEART RATE: 82 BPM | RESPIRATION RATE: 18 BRPM | BODY MASS INDEX: 22.2 KG/M2 | HEIGHT: 64 IN | SYSTOLIC BLOOD PRESSURE: 112 MMHG | OXYGEN SATURATION: 98 % | DIASTOLIC BLOOD PRESSURE: 75 MMHG | WEIGHT: 130 LBS | TEMPERATURE: 98 F

## 2021-12-30 DIAGNOSIS — K21.9 GASTROESOPHAGEAL REFLUX DISEASE, UNSPECIFIED WHETHER ESOPHAGITIS PRESENT: Primary | ICD-10-CM

## 2021-12-30 PROCEDURE — 71046 X-RAY EXAM CHEST 2 VIEWS: CPT | Performed by: EMERGENCY MEDICINE

## 2021-12-30 PROCEDURE — 93005 ELECTROCARDIOGRAM TRACING: CPT

## 2021-12-30 PROCEDURE — 99213 OFFICE O/P EST LOW 20 MIN: CPT

## 2021-12-30 RX ORDER — PANTOPRAZOLE SODIUM 40 MG/1
40 TABLET, DELAYED RELEASE ORAL DAILY
Qty: 30 TABLET | Refills: 0 | Status: SHIPPED | OUTPATIENT
Start: 2021-12-30 | End: 2022-01-29

## 2021-12-30 RX ORDER — MAGNESIUM HYDROXIDE/ALUMINUM HYDROXICE/SIMETHICONE 120; 1200; 1200 MG/30ML; MG/30ML; MG/30ML
30 SUSPENSION ORAL ONCE
Status: COMPLETED | OUTPATIENT
Start: 2021-12-30 | End: 2021-12-30

## 2021-12-30 RX ORDER — LIDOCAINE HYDROCHLORIDE 20 MG/ML
5 SOLUTION OROPHARYNGEAL ONCE
Status: COMPLETED | OUTPATIENT
Start: 2021-12-30 | End: 2021-12-30

## 2021-12-30 NOTE — ED INITIAL ASSESSMENT (HPI)
Patient presents to IC with 2 days of epigastric pain. Denies nausea,vomiting  Or diarrhea. No known h/o gerd.

## 2021-12-30 NOTE — TELEPHONE ENCOUNTER
Please have patient go to immediate care for evaluation. She may be experiencing symptoms of esophagitis. Also in the meantime, please refer her to GI with Dr. Max Tapia for possible upper endoscopy.

## 2021-12-30 NOTE — TELEPHONE ENCOUNTER
S/w pt. She started having esophageal pain in the middle of the night on Tuesday. It woke her up. She said the pain happens after swallowing. It can happen even with swallowing saliva. It is not \" heart burn\"  She rates the pain an 8 out of 10.  She denie

## 2021-12-30 NOTE — ED PROVIDER NOTES
Patient Seen in: [facility] [place]      History   Patient presents with:  Abdominal Pain    Stated Complaint: Pain to swallow 2 days    Subjective:   HPI  This is a 21-year-old female who presents with complaints with pain with swallowing.   The kg   LMP 12/16/2021 (Approximate)   SpO2 98%   BMI 22.31 kg/m²         Physical Exam    General: . Presently pain-free at this present time  The patient is in no respiratory distress.  The patient is not septic or toxic    HEENT: Atraumatic, conjunctiva ar is with food related. She felt much better I discussed that we can do some blood work but she felt comfortable without it I do not feel there is an acute coronary syndrome based on his story it is food related. Sounds like esophagitis.   I discussed that

## 2021-12-30 NOTE — TELEPHONE ENCOUNTER
Pt states has been having \"difficulty swallowing\" for about 2 days. States when eating, drinking, swallowing saliva, worst when laying down. States \"down the tube\" not exactly where sore throat would be.     States doesn't think it is acid reflux as

## 2021-12-30 NOTE — TELEPHONE ENCOUNTER
I called pt back. She was advised to go to the [address] [place] [address] for evaluation. She may have esophahgitis. Pt agreed to plan. I sent her a my chart message to call and make an appt with GI for a possible endoscopy if needed. Referral placed.

## 2021-12-31 LAB
ATRIAL RATE: 77 BPM
P AXIS: 63 DEGREES
P-R INTERVAL: 172 MS
Q-T INTERVAL: 366 MS
QRS DURATION: 74 MS
QTC CALCULATION (BEZET): 414 MS
R AXIS: 83 DEGREES
T AXIS: 48 DEGREES
VENTRICULAR RATE: 77 BPM

## 2022-01-04 RX ORDER — DUPILUMAB 300 MG/2ML
1 INJECTION, SOLUTION SUBCUTANEOUS
Qty: 4 ML | Refills: 5 | Status: SHIPPED | OUTPATIENT
Start: 2022-01-04

## 2022-01-04 NOTE — TELEPHONE ENCOUNTER
Patient last seen in Allergy (Telemedicine Visit) 7/12/2021 for . . .    Chronic ethmoidal sinusitis  (primary encounter diagnosis)  Nasal polyp  Moderate persistent extrinsic asthma without complication     Refill request received for .  . Alondra Ramachandran (

## 2022-01-05 NOTE — TELEPHONE ENCOUNTER
Message left on patient's voicemail informing her that Dr. Faustina Thomas refilled [address] and that she is past due for Allergy f/u appt. Patient asked to call the Allergy Department at [phone] or to schedule f/u appt through [facility].

## 2022-01-26 ENCOUNTER — OFFICE VISIT (OUTPATIENT)
Dept: INTERNAL MEDICINE CLINIC | Facility: CLINIC | Age: 47
End: 2022-01-26
Payer: COMMERCIAL

## 2022-01-26 VITALS
DIASTOLIC BLOOD PRESSURE: 68 MMHG | BODY MASS INDEX: 26.8 KG/M2 | RESPIRATION RATE: 16 BRPM | HEART RATE: 80 BPM | HEIGHT: 64 IN | WEIGHT: 157 LBS | SYSTOLIC BLOOD PRESSURE: 122 MMHG

## 2022-01-26 DIAGNOSIS — Z51.81 ENCOUNTER FOR THERAPEUTIC DRUG MONITORING: Primary | ICD-10-CM

## 2022-01-26 DIAGNOSIS — E66.3 OVERWEIGHT (BMI 25.0-29.9): ICD-10-CM

## 2022-01-26 PROCEDURE — 3008F BODY MASS INDEX DOCD: CPT | Performed by: INTERNAL MEDICINE

## 2022-01-26 PROCEDURE — 3074F SYST BP LT 130 MM HG: CPT | Performed by: INTERNAL MEDICINE

## 2022-01-26 PROCEDURE — 3078F DIAST BP <80 MM HG: CPT | Performed by: INTERNAL MEDICINE

## 2022-01-26 PROCEDURE — 99213 OFFICE O/P EST LOW 20 MIN: CPT | Performed by: INTERNAL MEDICINE

## 2022-01-26 RX ORDER — SEMAGLUTIDE 1.34 MG/ML
1 INJECTION, SOLUTION SUBCUTANEOUS WEEKLY
Qty: 3 EACH | Refills: 0 | Status: SHIPPED | OUTPATIENT
Start: 2022-01-26

## 2022-01-26 NOTE — PROGRESS NOTES
HISTORY OF PRESENT ILLNESS  Patient presents with:  Weight Check: down 10      Sherry Merlos is a 51year old female here for follow up in medical weight loss program.     Here for follow up   Down 10 lb   Did well with a few sips of soda.    Eating has been v GLOBULIN 3.4 09/11/2021    AGRATIO 1.7 01/17/2015     09/11/2021    K 3.6 09/11/2021     (H) 09/11/2021    CO2 22.0 09/11/2021     Lab Results   Component Value Date    EAG 85 08/31/2020    A1C 4.6 08/31/2020     Lab Results   Component Value D Inhale 1 puff into the lungs daily. , Disp: 3 each, Rfl: 1  albuterol sulfate (2.5 MG/3ML) 0.083% Inhalation Nebu Soln, Take 3 mL (2.5 mg total) by nebulization every 4 (four) hours as needed for Wheezing., Disp: 1 Box, Rfl: 1  Ipratropium Bromide 0.02 % In barriers to successful weight loss and weight maintenance  · FITTE: ACSM recommendations (150-300 minutes/ week in active weight loss)    There are no Patient Instructions on file for this visit. No follow-ups on file.     Patient verbalizes understandin

## 2022-02-16 ENCOUNTER — OFFICE VISIT (OUTPATIENT)
Dept: ALLERGY | Facility: CLINIC | Age: 47
End: 2022-02-16
Payer: COMMERCIAL

## 2022-02-16 VITALS
WEIGHT: 158 LBS | BODY MASS INDEX: 26.98 KG/M2 | HEIGHT: 64 IN | SYSTOLIC BLOOD PRESSURE: 100 MMHG | OXYGEN SATURATION: 100 % | HEART RATE: 81 BPM | DIASTOLIC BLOOD PRESSURE: 69 MMHG

## 2022-02-16 DIAGNOSIS — J33.9 NASAL POLYP: ICD-10-CM

## 2022-02-16 DIAGNOSIS — Z23 FLU VACCINE NEED: ICD-10-CM

## 2022-02-16 DIAGNOSIS — J30.9 ALLERGIC RHINITIS, UNSPECIFIED SEASONALITY, UNSPECIFIED TRIGGER: ICD-10-CM

## 2022-02-16 DIAGNOSIS — Z88.6 ALLERGY TO NONSTEROIDAL ANTI-INFLAMMATORY DRUG (NSAID): ICD-10-CM

## 2022-02-16 DIAGNOSIS — J45.40 MODERATE PERSISTENT EXTRINSIC ASTHMA WITHOUT COMPLICATION: ICD-10-CM

## 2022-02-16 DIAGNOSIS — Z92.29 COVID-19 VACCINE SERIES COMPLETED: ICD-10-CM

## 2022-02-16 DIAGNOSIS — J32.2 CHRONIC ETHMOIDAL SINUSITIS: Primary | ICD-10-CM

## 2022-02-16 PROCEDURE — 90686 IIV4 VACC NO PRSV 0.5 ML IM: CPT | Performed by: ALLERGY & IMMUNOLOGY

## 2022-02-16 PROCEDURE — 3078F DIAST BP <80 MM HG: CPT | Performed by: ALLERGY & IMMUNOLOGY

## 2022-02-16 PROCEDURE — 3074F SYST BP LT 130 MM HG: CPT | Performed by: ALLERGY & IMMUNOLOGY

## 2022-02-16 PROCEDURE — 99214 OFFICE O/P EST MOD 30 MIN: CPT | Performed by: ALLERGY & IMMUNOLOGY

## 2022-02-16 PROCEDURE — 90471 IMMUNIZATION ADMIN: CPT | Performed by: ALLERGY & IMMUNOLOGY

## 2022-02-16 PROCEDURE — 3008F BODY MASS INDEX DOCD: CPT | Performed by: ALLERGY & IMMUNOLOGY

## 2022-02-16 RX ORDER — PREDNISONE 10 MG/1
TABLET ORAL
Qty: 38 TABLET | Refills: 0 | Status: SHIPPED | OUTPATIENT
Start: 2022-02-16 | End: 2022-03-24 | Stop reason: ALTCHOICE

## 2022-02-16 NOTE — PATIENT INSTRUCTIONS
#1 chronic sinusitis with nasal polyposis  Much improved since starting Dupixent 300 mg every 2 weeks  One episode of steroids and antibiotics in September. Decreased use of steroids and antibiotics and starting Dupixent. No current fevers or mucopurulence. Some sense of smell. Continue with current Dupixent and intranasal steroids and Singulair. #2 asthma  Denies symptoms more than 2 days/week. ED visits or prednisone  Continue with Singulair. Currently off Trelegy without signs or symptoms of persistent asthma  Reviewed signs and symptoms of persistent asthma including the rules of 2  Flu vaccine given today    #3 allergic rhinitis  Continue with current medications.   Reviewed avoidance measures and potential treatment option of immunotherapy    #4 COVID vaccine up-to-date x3 doses    #5 flu vaccine given today

## 2022-02-17 ENCOUNTER — TELEPHONE (OUTPATIENT)
Dept: ALLERGY | Facility: CLINIC | Age: 47
End: 2022-02-17

## 2022-02-17 NOTE — TELEPHONE ENCOUNTER
Fax from [facility][1] my Covermymeds received stating that new PA will be needed. PA on file that we have is valid through 9/17/2021 to 9/17/2022 so there should be no reason to complete a new one. RN called [facility] and spoke with representative. They do not see any new PA requests on file or any PA expiration. They checked with [employer] and stated they tried running a new test claim through but it stated \"refill too soon. \"    They are asking that we call them back on Monday to check on PA necessity.

## 2022-02-22 NOTE — TELEPHONE ENCOUNTER
RN called [facility] again to check on PA status. Spoke with representative Jen Marshall and she states that current PA on file is valid through 9/17/2022 and no need to complete another PA.

## 2022-03-21 ENCOUNTER — LAB ENCOUNTER (OUTPATIENT)
Dept: LAB | Age: 47
End: 2022-03-21
Attending: FAMILY MEDICINE
Payer: COMMERCIAL

## 2022-03-21 DIAGNOSIS — E03.9 HYPOTHYROIDISM, UNSPECIFIED TYPE: ICD-10-CM

## 2022-03-21 LAB
T4 FREE SERPL-MCNC: 1.1 NG/DL (ref 0.8–1.7)
TSI SER-ACNC: 1.12 MIU/ML (ref 0.36–3.74)

## 2022-03-21 PROCEDURE — 84439 ASSAY OF FREE THYROXINE: CPT

## 2022-03-21 PROCEDURE — 36415 COLL VENOUS BLD VENIPUNCTURE: CPT

## 2022-03-21 PROCEDURE — 84443 ASSAY THYROID STIM HORMONE: CPT

## 2022-03-24 ENCOUNTER — OFFICE VISIT (OUTPATIENT)
Dept: FAMILY MEDICINE CLINIC | Facility: CLINIC | Age: 47
End: 2022-03-24
Payer: COMMERCIAL

## 2022-03-24 ENCOUNTER — PATIENT MESSAGE (OUTPATIENT)
Dept: FAMILY MEDICINE CLINIC | Facility: CLINIC | Age: 47
End: 2022-03-24

## 2022-03-24 VITALS
TEMPERATURE: 98 F | BODY MASS INDEX: 27.66 KG/M2 | HEART RATE: 96 BPM | DIASTOLIC BLOOD PRESSURE: 60 MMHG | SYSTOLIC BLOOD PRESSURE: 94 MMHG | WEIGHT: 162 LBS | RESPIRATION RATE: 16 BRPM | HEIGHT: 64 IN

## 2022-03-24 DIAGNOSIS — G47.19 EXCESSIVE DAYTIME SLEEPINESS: ICD-10-CM

## 2022-03-24 DIAGNOSIS — E03.9 ACQUIRED HYPOTHYROIDISM: Primary | ICD-10-CM

## 2022-03-24 DIAGNOSIS — R06.83 SNORING: ICD-10-CM

## 2022-03-24 PROCEDURE — 3008F BODY MASS INDEX DOCD: CPT | Performed by: FAMILY MEDICINE

## 2022-03-24 PROCEDURE — 3074F SYST BP LT 130 MM HG: CPT | Performed by: FAMILY MEDICINE

## 2022-03-24 PROCEDURE — 99214 OFFICE O/P EST MOD 30 MIN: CPT | Performed by: FAMILY MEDICINE

## 2022-03-24 PROCEDURE — 3078F DIAST BP <80 MM HG: CPT | Performed by: FAMILY MEDICINE

## 2022-03-24 RX ORDER — LEVOTHYROXINE SODIUM 137 UG/1
137 TABLET ORAL NIGHTLY
Qty: 90 TABLET | Refills: 1 | Status: SHIPPED | OUTPATIENT
Start: 2022-03-24 | End: 2022-03-28

## 2022-03-28 ENCOUNTER — TELEPHONE (OUTPATIENT)
Dept: FAMILY MEDICINE CLINIC | Facility: CLINIC | Age: 47
End: 2022-03-28

## 2022-03-28 RX ORDER — LEVOTHYROXINE SODIUM 137 UG/1
137 TABLET ORAL
Qty: 90 TABLET | Refills: 1 | COMMUNITY
Start: 2022-03-28

## 2022-03-28 NOTE — TELEPHONE ENCOUNTER
Received call from [employer]  [phone]  He wanted clarification as pts Levothyroxine was prescribed as \"nightly\", but should be taken daily in the morning before breakfast.  Advised I will review and call back. Previous fills also written as nighty. Call to pt to inquire how she takes the medication. She sts she does take it in the morning before breakfast. Confirms she has never taken it nighty. Rx updated as historical.    Call to [address]. Advised him that rx should state before breakfast.  He confirms, sts he will update rx.

## 2022-04-20 RX ORDER — CETIRIZINE HYDROCHLORIDE 10 MG/1
TABLET ORAL
Qty: 90 TABLET | Refills: 0 | Status: SHIPPED | OUTPATIENT
Start: 2022-04-20

## 2022-04-25 ENCOUNTER — TELEPHONE (OUTPATIENT)
Dept: SURGERY | Facility: CLINIC | Age: 47
End: 2022-04-25

## 2022-04-25 NOTE — TELEPHONE ENCOUNTER
Per last OV note by Antelmo Curry on 11-18-22   \"Jamila has an incidental meningioma with roughly 50% growth in 10 years, however measurements are subject to differences in technique. She is asymptomatic from this lesion. Plan to repeat MRI Brain w/ and w/o in 6 months. \"      MRI order pended.

## 2022-06-01 RX ORDER — DUPILUMAB 300 MG/2ML
1 INJECTION, SOLUTION SUBCUTANEOUS
Qty: 4 ML | Refills: 2 | Status: SHIPPED | OUTPATIENT
Start: 2022-06-01

## 2022-06-06 RX ORDER — SEMAGLUTIDE 1.34 MG/ML
INJECTION, SOLUTION SUBCUTANEOUS
Qty: 9 ML | Refills: 0 | Status: SHIPPED | OUTPATIENT
Start: 2022-06-06

## 2022-06-06 NOTE — TELEPHONE ENCOUNTER
Requesting Ozempic 1 mg  LOV: 1/26/22  RTC: not noted  Last Relevant Labs: 8/31/20  Filled: 1/26/22 #3 pens with 0 refills    Future Appointments   Date Time Provider Adela Small   7/19/2022  3:20 PM Aleena Corrales MD [facility] [address]     We cancelled appt 4/27  Pt cancelled appt 4/28

## 2022-06-13 ENCOUNTER — PATIENT MESSAGE (OUTPATIENT)
Dept: ALLERGY | Facility: CLINIC | Age: 47
End: 2022-06-13

## 2022-06-13 RX ORDER — DUPILUMAB 300 MG/2ML
1 INJECTION, SOLUTION SUBCUTANEOUS
Qty: 4 ML | Refills: 0 | Status: SHIPPED | OUTPATIENT
Start: 2022-06-13

## 2022-06-15 RX ORDER — DUPILUMAB 300 MG/2ML
1 INJECTION, SOLUTION SUBCUTANEOUS
Qty: 4 ML | Refills: 0 | Status: SHIPPED | OUTPATIENT
Start: 2022-06-15

## 2022-06-15 NOTE — TELEPHONE ENCOUNTER
From: Brijesh Ugarte  To: Alex Patel MD  Sent: 6/13/2022 10:53 PM CDT  Subject: Rx refill    Hi I need to have my Dupixent refilled. I also was inquiring, I saw that [address] comes in the pen form and was wondering if I was able to get it that way?     Thanks   Brijesh Ugatre

## 2022-06-17 NOTE — TELEPHONE ENCOUNTER
Dr. Adonis Boyce, patient is requesting Dupixent Pen Injector instead of the prefilled syringes of Dupixent. May we send a prescription for Dupixent Prefilled Pen. Patient is due to follow-up in allergy 8/2022. There is not currently a f/u appt scheduled.

## 2022-06-21 ENCOUNTER — TELEPHONE (OUTPATIENT)
Dept: ALLERGY | Facility: CLINIC | Age: 47
End: 2022-06-21

## 2022-06-21 RX ORDER — DUPILUMAB 300 MG/2ML
300 INJECTION, SOLUTION SUBCUTANEOUS
Qty: 4 ML | Refills: 5 | Status: SHIPPED | OUTPATIENT
Start: 2022-06-21 | End: 2022-07-19

## 2022-06-21 RX ORDER — DUPILUMAB 300 MG/2ML
INJECTION, SOLUTION SUBCUTANEOUS
Qty: 3.92 ML | Refills: 0 | Status: CANCELLED | OUTPATIENT
Start: 2022-06-21 | End: 2022-07-19

## 2022-06-21 NOTE — TELEPHONE ENCOUNTER
Dr. Hanson Said, Rx for Dupixent Pen pended. Please review and sign. Patient contacted and agreed to schedule an Allergy f/u appt olvin Engel.

## 2022-06-21 NOTE — TELEPHONE ENCOUNTER
Patient contacted via telephone and states that she would like to switch Dupixent to the prefilled Pens. Patient informed Rx for the Dupixent prefilled Pens will be sent the specialty pharmacy. RN will have PA for Dupixent prefilled pen initiated. Patient reminded that she is due to see Dr. Radha Dean for f/u appt 8/2022. Patient offers that she will schedule f/u appt with Dr. Radha Dean through [address]

## 2022-06-27 NOTE — TELEPHONE ENCOUNTER
Fax received [employer] reporting that PA for Dupixent prefilled Pen (300 mg/2mL) is approved     PA number: [id number]    9/18/2022- 9/18/2023. [facility] Rep Giovanna Basket) contacted and she offers that PA for Pen will not go through for current PA.

## 2022-06-29 ENCOUNTER — HOSPITAL ENCOUNTER (OUTPATIENT)
Dept: MRI IMAGING | Age: 47
Discharge: HOME | End: 2022-06-29
Attending: PHYSICIAN ASSISTANT
Payer: COMMERCIAL

## 2022-06-29 DIAGNOSIS — D32.9 MENINGIOMA (HCC): ICD-10-CM

## 2022-06-29 PROCEDURE — 70553 MRI BRAIN STEM W/O & W/DYE: CPT | Performed by: PHYSICIAN ASSISTANT

## 2022-06-29 PROCEDURE — A9575 INJ GADOTERATE MEGLUMI 0.1ML: HCPCS | Performed by: PHYSICIAN ASSISTANT

## 2022-07-01 NOTE — TELEPHONE ENCOUNTER
Fax from [employer] received asking for refills of pre-filled syringes. RN called [facility] to notify them that previous order for pre-filled syringes needs to be canceled since pt switched over to pre-filled pens. Spoke with rep named Shavon Mendoza. HE reports that he sees both orders and that previous order should have been canceled out when pens were ordered. He states he will send an email to pharmacy team to notify them. He states that a shipment for pre-filled syringes already went out earlier this week and is on it's way. RN called pt to notify her of situation and she is okay with using pre-filled syringes one more time. She is aware that she is due for a follow up appt as well. She states she will call later to schedule as she does not have her schedule with her.

## 2022-07-05 ENCOUNTER — HOSPITAL ENCOUNTER (OUTPATIENT)
Dept: MAMMOGRAPHY | Age: 47
Discharge: HOME | End: 2022-07-05
Attending: NURSE PRACTITIONER
Payer: COMMERCIAL

## 2022-07-05 DIAGNOSIS — Z12.31 ENCOUNTER FOR SCREENING MAMMOGRAM FOR BREAST CANCER: ICD-10-CM

## 2022-07-05 PROCEDURE — 77067 SCR MAMMO BI INCL CAD: CPT | Performed by: NURSE PRACTITIONER

## 2022-07-05 PROCEDURE — 77063 BREAST TOMOSYNTHESIS BI: CPT | Performed by: NURSE PRACTITIONER

## 2022-07-07 ENCOUNTER — PATIENT MESSAGE (OUTPATIENT)
Dept: NEUROLOGY | Facility: CLINIC | Age: 47
End: 2022-07-07

## 2022-07-08 NOTE — TELEPHONE ENCOUNTER
From: Darling Rae  To: Zack Toth MD  Sent: 7/7/2022 8:44 PM CDT  Subject: Mri results     Hi I was checking in to see if my MRI results have changed compared to my prior MRI done in Oct.  Results went to Dr Jeremy Prabhakar.     Thanks   Darling Rae  [phone]

## 2022-07-11 ENCOUNTER — OFFICE VISIT (OUTPATIENT)
Dept: SLEEP CENTER | Age: 47
End: 2022-07-11
Attending: INTERNAL MEDICINE
Payer: COMMERCIAL

## 2022-07-11 DIAGNOSIS — G47.19 EXCESSIVE DAYTIME SLEEPINESS: ICD-10-CM

## 2022-07-11 DIAGNOSIS — R06.83 SNORING: ICD-10-CM

## 2022-07-11 PROCEDURE — 95806 SLEEP STUDY UNATT&RESP EFFT: CPT

## 2022-07-18 DIAGNOSIS — J30.2 SEASONAL ALLERGIES: ICD-10-CM

## 2022-07-18 RX ORDER — CETIRIZINE HYDROCHLORIDE 10 MG/1
TABLET ORAL
Qty: 90 TABLET | Refills: 0 | Status: SHIPPED | OUTPATIENT
Start: 2022-07-18

## 2022-07-19 ENCOUNTER — OFFICE VISIT (OUTPATIENT)
Dept: INTERNAL MEDICINE CLINIC | Facility: CLINIC | Age: 47
End: 2022-07-19
Payer: COMMERCIAL

## 2022-07-19 VITALS
RESPIRATION RATE: 16 BRPM | HEIGHT: 64 IN | HEART RATE: 78 BPM | SYSTOLIC BLOOD PRESSURE: 118 MMHG | DIASTOLIC BLOOD PRESSURE: 78 MMHG | BODY MASS INDEX: 27.31 KG/M2 | WEIGHT: 160 LBS

## 2022-07-19 DIAGNOSIS — G47.33 OSA (OBSTRUCTIVE SLEEP APNEA): ICD-10-CM

## 2022-07-19 DIAGNOSIS — Z51.81 ENCOUNTER FOR THERAPEUTIC DRUG MONITORING: Primary | ICD-10-CM

## 2022-07-19 DIAGNOSIS — R53.83 FATIGUE, UNSPECIFIED TYPE: ICD-10-CM

## 2022-07-19 DIAGNOSIS — E66.3 OVERWEIGHT (BMI 25.0-29.9): ICD-10-CM

## 2022-07-19 PROCEDURE — 3078F DIAST BP <80 MM HG: CPT | Performed by: INTERNAL MEDICINE

## 2022-07-19 PROCEDURE — 99213 OFFICE O/P EST LOW 20 MIN: CPT | Performed by: INTERNAL MEDICINE

## 2022-07-19 PROCEDURE — 3074F SYST BP LT 130 MM HG: CPT | Performed by: INTERNAL MEDICINE

## 2022-07-19 PROCEDURE — 3008F BODY MASS INDEX DOCD: CPT | Performed by: INTERNAL MEDICINE

## 2022-07-19 RX ORDER — SEMAGLUTIDE 1.34 MG/ML
1 INJECTION, SOLUTION SUBCUTANEOUS WEEKLY
Qty: 9 ML | Refills: 0 | Status: SHIPPED | OUTPATIENT
Start: 2022-07-19

## 2022-07-19 RX ORDER — BUPROPION HYDROCHLORIDE 150 MG/1
TABLET, EXTENDED RELEASE ORAL
Qty: 180 TABLET | Refills: 0 | Status: CANCELLED | OUTPATIENT
Start: 2022-07-19

## 2022-07-19 RX ORDER — TOPIRAMATE 100 MG/1
100 TABLET, FILM COATED ORAL 2 TIMES DAILY
Qty: 180 TABLET | Refills: 0 | Status: CANCELLED | OUTPATIENT
Start: 2022-07-19

## 2022-07-19 RX ORDER — PHENTERMINE HYDROCHLORIDE 37.5 MG/1
37.5 TABLET ORAL
Qty: 30 TABLET | Refills: 1 | Status: SHIPPED | OUTPATIENT
Start: 2022-07-19

## 2022-08-16 ENCOUNTER — TELEPHONE (OUTPATIENT)
Dept: ALLERGY | Facility: CLINIC | Age: 47
End: 2022-08-16

## 2022-08-26 NOTE — TELEPHONE ENCOUNTER
RN called [facility] to get PA re-approved for pre-filled pens. Spoke to rep named Sebastian Lechuga. She reports PA is re-approved 09/18/2022 to 09/18/2023    She is going to fax approval letter to our office for our records. Confirmed fax number--awaiting fax.

## 2022-08-30 NOTE — TELEPHONE ENCOUNTER
PA approval for Dupixent Received via fax from [address] 300 mg/2mL prefilled pens    Effective Dates: 9/18/2022- 9/18/2023    PA #: [id number]

## 2022-09-02 NOTE — TELEPHONE ENCOUNTER
RN called [facility] to report PA approval dates and case number. Spoke with rep named Veronika Hussein.

## 2022-09-20 ENCOUNTER — PATIENT MESSAGE (OUTPATIENT)
Dept: FAMILY MEDICINE CLINIC | Facility: CLINIC | Age: 47
End: 2022-09-20

## 2022-09-20 DIAGNOSIS — J30.2 SEASONAL ALLERGIES: ICD-10-CM

## 2022-09-20 PROCEDURE — 87624 HPV HI-RISK TYP POOLED RSLT: CPT | Performed by: OBSTETRICS & GYNECOLOGY

## 2022-09-20 RX ORDER — PHENTERMINE HYDROCHLORIDE 37.5 MG/1
TABLET ORAL
Qty: 30 TABLET | Refills: 0 | Status: SHIPPED | OUTPATIENT
Start: 2022-09-20 | End: 2022-09-26

## 2022-09-20 RX ORDER — CETIRIZINE HYDROCHLORIDE 10 MG/1
TABLET ORAL
Qty: 90 TABLET | Refills: 0 | Status: SHIPPED | OUTPATIENT
Start: 2022-09-20

## 2022-09-21 RX ORDER — DIAZEPAM 2 MG/1
2 TABLET ORAL 3 TIMES DAILY PRN
Qty: 10 TABLET | Refills: 0 | Status: SHIPPED | OUTPATIENT
Start: 2022-09-21

## 2022-09-21 NOTE — TELEPHONE ENCOUNTER
If no prior history of taking benzodiazepines, recommend valium 2mg TID PRN with #10 and no refills. This should give enough pills for round trip. Also recommend taking it now to make sure of no side effects before the flight.

## 2022-09-26 ENCOUNTER — TELEMEDICINE (OUTPATIENT)
Dept: INTERNAL MEDICINE CLINIC | Facility: CLINIC | Age: 47
End: 2022-09-26

## 2022-09-26 DIAGNOSIS — Z51.81 ENCOUNTER FOR THERAPEUTIC DRUG MONITORING: Primary | ICD-10-CM

## 2022-09-26 DIAGNOSIS — E66.3 OVERWEIGHT (BMI 25.0-29.9): ICD-10-CM

## 2022-09-26 RX ORDER — SEMAGLUTIDE 1.34 MG/ML
1 INJECTION, SOLUTION SUBCUTANEOUS WEEKLY
Qty: 9 ML | Refills: 0 | Status: SHIPPED | OUTPATIENT
Start: 2022-09-26

## 2022-09-26 RX ORDER — PHENTERMINE HYDROCHLORIDE 37.5 MG/1
37.5 TABLET ORAL
Qty: 30 TABLET | Refills: 2 | Status: SHIPPED | OUTPATIENT
Start: 2022-09-26

## 2022-11-01 RX ORDER — BUPROPION HYDROCHLORIDE 150 MG/1
TABLET, EXTENDED RELEASE ORAL
Qty: 180 TABLET | Refills: 0 | Status: SHIPPED | OUTPATIENT
Start: 2022-11-01

## 2022-11-02 ENCOUNTER — PATIENT MESSAGE (OUTPATIENT)
Dept: INTERNAL MEDICINE CLINIC | Facility: CLINIC | Age: 47
End: 2022-11-02

## 2022-11-02 RX ORDER — TOPIRAMATE 100 MG/1
TABLET, FILM COATED ORAL
Qty: 180 TABLET | Refills: 0 | OUTPATIENT
Start: 2022-11-02

## 2022-11-02 NOTE — TELEPHONE ENCOUNTER
Requesting topiramate 100 mg  LOV: 9/26/22  RTC: not noted  Last Relevant Labs: na  Filled: 7/14/21 #180 with 0 refills    Future Appointments   Date Time Provider Adela Small   11/3/2022  4:00 PM Marilou Perez DO [id number]   1/10/2023  1:40 PM Shaylee Casey MD [facility] [address]     Denied this med as it has not been prescribed since July of 2021

## 2022-11-03 ENCOUNTER — OFFICE VISIT (OUTPATIENT)
Dept: FAMILY MEDICINE CLINIC | Facility: CLINIC | Age: 47
End: 2022-11-03
Payer: COMMERCIAL

## 2022-11-03 VITALS
BODY MASS INDEX: 28 KG/M2 | TEMPERATURE: 98 F | DIASTOLIC BLOOD PRESSURE: 68 MMHG | WEIGHT: 158 LBS | HEART RATE: 90 BPM | RESPIRATION RATE: 14 BRPM | HEIGHT: 63 IN | SYSTOLIC BLOOD PRESSURE: 100 MMHG

## 2022-11-03 DIAGNOSIS — J45.41 MODERATE PERSISTENT ASTHMA WITH EXACERBATION: ICD-10-CM

## 2022-11-03 DIAGNOSIS — G89.29 CHRONIC BILATERAL LOW BACK PAIN WITHOUT SCIATICA: ICD-10-CM

## 2022-11-03 DIAGNOSIS — L60.9 NAIL ABNORMALITIES: ICD-10-CM

## 2022-11-03 DIAGNOSIS — E03.9 ACQUIRED HYPOTHYROIDISM: ICD-10-CM

## 2022-11-03 DIAGNOSIS — Z12.11 COLON CANCER SCREENING: ICD-10-CM

## 2022-11-03 DIAGNOSIS — Z00.00 ROUTINE GENERAL MEDICAL EXAMINATION AT A HEALTH CARE FACILITY: Primary | ICD-10-CM

## 2022-11-03 DIAGNOSIS — M54.50 CHRONIC BILATERAL LOW BACK PAIN WITHOUT SCIATICA: ICD-10-CM

## 2022-11-03 DIAGNOSIS — R00.2 INTERMITTENT PALPITATIONS: ICD-10-CM

## 2022-11-03 PROCEDURE — 99213 OFFICE O/P EST LOW 20 MIN: CPT | Performed by: FAMILY MEDICINE

## 2022-11-03 PROCEDURE — 3078F DIAST BP <80 MM HG: CPT | Performed by: FAMILY MEDICINE

## 2022-11-03 PROCEDURE — 99396 PREV VISIT EST AGE 40-64: CPT | Performed by: FAMILY MEDICINE

## 2022-11-03 PROCEDURE — 3074F SYST BP LT 130 MM HG: CPT | Performed by: FAMILY MEDICINE

## 2022-11-03 PROCEDURE — 3008F BODY MASS INDEX DOCD: CPT | Performed by: FAMILY MEDICINE

## 2022-11-03 RX ORDER — TOPIRAMATE 100 MG/1
100 TABLET, FILM COATED ORAL 2 TIMES DAILY
Qty: 180 TABLET | Refills: 0 | Status: SHIPPED | OUTPATIENT
Start: 2022-11-03 | End: 2022-12-03

## 2022-11-03 RX ORDER — ALBUTEROL SULFATE 90 UG/1
2 AEROSOL, METERED RESPIRATORY (INHALATION) EVERY 4 HOURS PRN
Qty: 3 EACH | Refills: 1 | Status: SHIPPED | OUTPATIENT
Start: 2022-11-03

## 2022-11-03 RX ORDER — DIAZEPAM 2 MG/1
2 TABLET ORAL 3 TIMES DAILY PRN
Qty: 10 TABLET | Refills: 0 | Status: SHIPPED | OUTPATIENT
Start: 2022-11-03

## 2022-11-08 ENCOUNTER — PATIENT MESSAGE (OUTPATIENT)
Dept: FAMILY MEDICINE CLINIC | Facility: CLINIC | Age: 47
End: 2022-11-08

## 2022-11-09 ENCOUNTER — PATIENT MESSAGE (OUTPATIENT)
Dept: FAMILY MEDICINE CLINIC | Facility: CLINIC | Age: 47
End: 2022-11-09

## 2022-11-10 ENCOUNTER — LAB ENCOUNTER (OUTPATIENT)
Dept: LAB | Age: 47
End: 2022-11-10
Attending: FAMILY MEDICINE
Payer: COMMERCIAL

## 2022-11-10 DIAGNOSIS — Z00.00 ROUTINE GENERAL MEDICAL EXAMINATION AT A HEALTH CARE FACILITY: ICD-10-CM

## 2022-11-10 DIAGNOSIS — E03.9 ACQUIRED HYPOTHYROIDISM: ICD-10-CM

## 2022-11-10 LAB
ALBUMIN SERPL-MCNC: 3.6 G/DL (ref 3.4–5)
ALBUMIN/GLOB SERPL: 1 {RATIO} (ref 1–2)
ALP LIVER SERPL-CCNC: 65 U/L
ALT SERPL-CCNC: 15 U/L
ANION GAP SERPL CALC-SCNC: 10 MMOL/L (ref 0–18)
AST SERPL-CCNC: 8 U/L (ref 15–37)
BILIRUB SERPL-MCNC: 0.5 MG/DL (ref 0.1–2)
BILIRUB UR QL: NEGATIVE
BUN BLD-MCNC: 8 MG/DL (ref 7–18)
BUN/CREAT SERPL: 10.5 (ref 10–20)
CALCIUM BLD-MCNC: 9.1 MG/DL (ref 8.5–10.1)
CHLORIDE SERPL-SCNC: 106 MMOL/L (ref 98–112)
CHOLEST SERPL-MCNC: 167 MG/DL (ref ?–200)
CLARITY UR: CLEAR
CO2 SERPL-SCNC: 26 MMOL/L (ref 21–32)
COLOR UR: YELLOW
CREAT BLD-MCNC: 0.76 MG/DL
FASTING PATIENT LIPID ANSWER: YES
FASTING STATUS PATIENT QL REPORTED: YES
GFR SERPLBLD BASED ON 1.73 SQ M-ARVRAT: 97 ML/MIN/1.73M2 (ref 60–?)
GLOBULIN PLAS-MCNC: 3.5 G/DL (ref 2.8–4.4)
GLUCOSE BLD-MCNC: 77 MG/DL (ref 70–99)
GLUCOSE UR-MCNC: NEGATIVE MG/DL
HDLC SERPL-MCNC: 48 MG/DL (ref 40–59)
HGB UR QL STRIP.AUTO: NEGATIVE
KETONES UR-MCNC: NEGATIVE MG/DL
LDLC SERPL CALC-MCNC: 106 MG/DL (ref ?–100)
LEUKOCYTE ESTERASE UR QL STRIP.AUTO: NEGATIVE
NITRITE UR QL STRIP.AUTO: NEGATIVE
NONHDLC SERPL-MCNC: 119 MG/DL (ref ?–130)
OSMOLALITY SERPL CALC.SUM OF ELEC: 291 MOSM/KG (ref 275–295)
PH UR: 5 [PH] (ref 5–8)
POTASSIUM SERPL-SCNC: 3.9 MMOL/L (ref 3.5–5.1)
PROT SERPL-MCNC: 7.1 G/DL (ref 6.4–8.2)
PROT UR-MCNC: NEGATIVE MG/DL
SODIUM SERPL-SCNC: 142 MMOL/L (ref 136–145)
SP GR UR STRIP: 1.03 (ref 1–1.03)
T3 SERPL-MCNC: 117 NG/DL (ref 60–181)
T4 FREE SERPL-MCNC: 1.3 NG/DL (ref 0.8–1.7)
TRIGL SERPL-MCNC: 67 MG/DL (ref 30–149)
TSI SER-ACNC: 2.01 MIU/ML (ref 0.36–3.74)
UROBILINOGEN UR STRIP-ACNC: <2
VIT C UR-MCNC: NEGATIVE MG/DL
VLDLC SERPL CALC-MCNC: 11 MG/DL (ref 0–30)

## 2022-11-10 PROCEDURE — 85060 BLOOD SMEAR INTERPRETATION: CPT

## 2022-11-10 PROCEDURE — 85025 COMPLETE CBC W/AUTO DIFF WBC: CPT

## 2022-11-10 PROCEDURE — 80061 LIPID PANEL: CPT

## 2022-11-10 PROCEDURE — 36415 COLL VENOUS BLD VENIPUNCTURE: CPT

## 2022-11-10 PROCEDURE — 80053 COMPREHEN METABOLIC PANEL: CPT

## 2022-11-10 PROCEDURE — 84439 ASSAY OF FREE THYROXINE: CPT

## 2022-11-10 PROCEDURE — 84480 ASSAY TRIIODOTHYRONINE (T3): CPT

## 2022-11-10 PROCEDURE — 81003 URINALYSIS AUTO W/O SCOPE: CPT

## 2022-11-10 PROCEDURE — 84443 ASSAY THYROID STIM HORMONE: CPT

## 2022-11-10 PROCEDURE — 88305 TISSUE EXAM BY PATHOLOGIST: CPT | Performed by: OBSTETRICS & GYNECOLOGY

## 2022-11-11 ENCOUNTER — PATIENT MESSAGE (OUTPATIENT)
Dept: FAMILY MEDICINE CLINIC | Facility: CLINIC | Age: 47
End: 2022-11-11

## 2022-11-11 LAB
BASOPHILS # BLD AUTO: 0.1 X10(3) UL (ref 0–0.2)
BASOPHILS NFR BLD AUTO: 1.3 %
DEPRECATED RDW RBC AUTO: 41.8 FL (ref 35.1–46.3)
EOSINOPHIL # BLD AUTO: 1.33 X10(3) UL (ref 0–0.7)
EOSINOPHIL NFR BLD AUTO: 17.8 %
ERYTHROCYTE [DISTWIDTH] IN BLOOD BY AUTOMATED COUNT: 12.6 % (ref 11–15)
HCT VFR BLD AUTO: 46.6 %
HGB BLD-MCNC: 15.1 G/DL
IMM GRANULOCYTES # BLD AUTO: 0.02 X10(3) UL (ref 0–1)
IMM GRANULOCYTES NFR BLD: 0.3 %
LYMPHOCYTES # BLD AUTO: 2.26 X10(3) UL (ref 1–4)
LYMPHOCYTES NFR BLD AUTO: 30.3 %
MCH RBC QN AUTO: 29.2 PG (ref 26–34)
MCHC RBC AUTO-ENTMCNC: 32.4 G/DL (ref 31–37)
MCV RBC AUTO: 90.1 FL
MONOCYTES # BLD AUTO: 0.46 X10(3) UL (ref 0.1–1)
MONOCYTES NFR BLD AUTO: 6.2 %
NEUTROPHILS # BLD AUTO: 3.3 X10 (3) UL (ref 1.5–7.7)
NEUTROPHILS # BLD AUTO: 3.3 X10(3) UL (ref 1.5–7.7)
NEUTROPHILS NFR BLD AUTO: 44.1 %
PLATELET # BLD AUTO: 258 10(3)UL (ref 150–450)
RBC # BLD AUTO: 5.17 X10(6)UL
WBC # BLD AUTO: 7.5 X10(3) UL (ref 4–11)

## 2022-11-14 NOTE — TELEPHONE ENCOUNTER
I am not concerned for low AST and is only one of her liver enzymes. Her eosinophils were elevated not low and are likely related to allergies.

## 2022-11-22 ENCOUNTER — PATIENT MESSAGE (OUTPATIENT)
Dept: FAMILY MEDICINE CLINIC | Facility: CLINIC | Age: 47
End: 2022-11-22

## 2022-11-22 NOTE — TELEPHONE ENCOUNTER
Called Pt and informed her we have received her form and filled it out. Dr. Eva Yang will review and sign it tomorrow and send it first thing tomorrow (11/23/22) morning.

## 2022-11-22 NOTE — TELEPHONE ENCOUNTER
From: Panfilo Saul  To: [facility],   Sent: 11/22/2022 4:29 PM CST  Subject: Physical     Sorry this is the phone number to fax the form to [phone] if you can send me a copy of it I think I am able to upload it to their site also. Thank you.

## 2022-11-23 ENCOUNTER — PATIENT MESSAGE (OUTPATIENT)
Dept: FAMILY MEDICINE CLINIC | Facility: CLINIC | Age: 47
End: 2022-11-23

## 2022-11-23 NOTE — TELEPHONE ENCOUNTER
[facility] sent to Pt informing her that physical form has been completed, signed, and faxed by Dr. Marie Fox.

## 2022-11-28 ENCOUNTER — PATIENT MESSAGE (OUTPATIENT)
Dept: FAMILY MEDICINE CLINIC | Facility: CLINIC | Age: 47
End: 2022-11-28

## 2022-11-28 DIAGNOSIS — R06.83 SNORING: Primary | ICD-10-CM

## 2022-11-28 DIAGNOSIS — G47.19 EXCESSIVE DAYTIME SLEEPINESS: ICD-10-CM

## 2022-11-28 DIAGNOSIS — Z71.89 CPAP USE COUNSELING: ICD-10-CM

## 2022-11-28 RX ORDER — DUPILUMAB 300 MG/2ML
300 INJECTION, SOLUTION SUBCUTANEOUS
Qty: 4 ML | Refills: 2 | Status: CANCELLED | OUTPATIENT
Start: 2022-11-28

## 2022-11-28 RX ORDER — DUPILUMAB 300 MG/2ML
300 INJECTION, SOLUTION SUBCUTANEOUS
Qty: 4 ML | Refills: 2 | Status: SHIPPED | OUTPATIENT
Start: 2022-11-28 | End: 2022-12-26

## 2022-11-28 NOTE — TELEPHONE ENCOUNTER
Who is Zena Polk? ? Pulmonologist?? If so, needs to check with insurance to see if she is covered.   If she is with company supplying cpap, she should not need a referral.

## 2022-11-29 ENCOUNTER — HOSPITAL ENCOUNTER (EMERGENCY)
Facility: HOSPITAL | Age: 47
Discharge: HOME | End: 2022-11-29
Attending: EMERGENCY MEDICINE
Payer: COMMERCIAL

## 2022-11-29 ENCOUNTER — APPOINTMENT (OUTPATIENT)
Dept: ULTRASOUND IMAGING | Facility: HOSPITAL | Age: 47
End: 2022-11-29
Attending: EMERGENCY MEDICINE
Payer: COMMERCIAL

## 2022-11-29 VITALS
OXYGEN SATURATION: 100 % | RESPIRATION RATE: 18 BRPM | HEIGHT: 63 IN | WEIGHT: 150 LBS | HEART RATE: 95 BPM | TEMPERATURE: 98 F | BODY MASS INDEX: 26.58 KG/M2 | SYSTOLIC BLOOD PRESSURE: 110 MMHG | DIASTOLIC BLOOD PRESSURE: 76 MMHG

## 2022-11-29 DIAGNOSIS — M79.661 RIGHT CALF PAIN: Primary | ICD-10-CM

## 2022-11-29 PROCEDURE — 99284 EMERGENCY DEPT VISIT MOD MDM: CPT

## 2022-11-29 PROCEDURE — 93971 EXTREMITY STUDY: CPT | Performed by: EMERGENCY MEDICINE

## 2022-11-29 RX ORDER — ACETAMINOPHEN 500 MG
1000 TABLET ORAL ONCE
Status: COMPLETED | OUTPATIENT
Start: 2022-11-29 | End: 2022-11-29

## 2022-11-29 NOTE — DISCHARGE INSTRUCTIONS
Continue Tylenol every 4-6 hours as needed for pain  Warm compress topically 20 minutes every 2 hours while awake  May try lidocaine patch over-the-counter and apply topically. Return if worse  Pap to primary care physician, call today for follow-up appointment.   May need further evaluation

## 2022-11-29 NOTE — TELEPHONE ENCOUNTER
[facility] sent to Pt asking who her appoint is with and to contact insurance if she is covered.

## 2022-11-30 ENCOUNTER — PATIENT OUTREACH (OUTPATIENT)
Dept: CASE MANAGEMENT | Age: 47
End: 2022-11-30

## 2022-12-01 ENCOUNTER — LAB ENCOUNTER (OUTPATIENT)
Dept: LAB | Age: 47
End: 2022-12-01
Attending: FAMILY MEDICINE
Payer: COMMERCIAL

## 2022-12-01 ENCOUNTER — OFFICE VISIT (OUTPATIENT)
Dept: FAMILY MEDICINE CLINIC | Facility: CLINIC | Age: 47
End: 2022-12-01
Payer: COMMERCIAL

## 2022-12-01 VITALS
TEMPERATURE: 97 F | WEIGHT: 149 LBS | HEART RATE: 96 BPM | SYSTOLIC BLOOD PRESSURE: 110 MMHG | HEIGHT: 63 IN | RESPIRATION RATE: 14 BRPM | DIASTOLIC BLOOD PRESSURE: 82 MMHG | BODY MASS INDEX: 26.4 KG/M2

## 2022-12-01 DIAGNOSIS — M79.10 MYALGIA: ICD-10-CM

## 2022-12-01 DIAGNOSIS — M79.10 MYALGIA: Primary | ICD-10-CM

## 2022-12-01 LAB
ALBUMIN SERPL-MCNC: 3.6 G/DL (ref 3.4–5)
ALBUMIN/GLOB SERPL: 0.9 {RATIO} (ref 1–2)
ALP LIVER SERPL-CCNC: 69 U/L
ALT SERPL-CCNC: 18 U/L
ANION GAP SERPL CALC-SCNC: 4 MMOL/L (ref 0–18)
AST SERPL-CCNC: 8 U/L (ref 15–37)
BILIRUB SERPL-MCNC: 0.5 MG/DL (ref 0.1–2)
BUN BLD-MCNC: 10 MG/DL (ref 7–18)
BUN/CREAT SERPL: 10.8 (ref 10–20)
CALCIUM BLD-MCNC: 8.8 MG/DL (ref 8.5–10.1)
CHLORIDE SERPL-SCNC: 111 MMOL/L (ref 98–112)
CK SERPL-CCNC: 43 U/L
CO2 SERPL-SCNC: 24 MMOL/L (ref 21–32)
CREAT BLD-MCNC: 0.93 MG/DL
FASTING STATUS PATIENT QL REPORTED: NO
GFR SERPLBLD BASED ON 1.73 SQ M-ARVRAT: 76 ML/MIN/1.73M2 (ref 60–?)
GLOBULIN PLAS-MCNC: 4.1 G/DL (ref 2.8–4.4)
GLUCOSE BLD-MCNC: 88 MG/DL (ref 70–99)
MAGNESIUM SERPL-MCNC: 2.1 MG/DL (ref 1.6–2.6)
OSMOLALITY SERPL CALC.SUM OF ELEC: 286 MOSM/KG (ref 275–295)
POTASSIUM SERPL-SCNC: 3.9 MMOL/L (ref 3.5–5.1)
PROT SERPL-MCNC: 7.7 G/DL (ref 6.4–8.2)
SODIUM SERPL-SCNC: 139 MMOL/L (ref 136–145)

## 2022-12-01 PROCEDURE — 3008F BODY MASS INDEX DOCD: CPT | Performed by: FAMILY MEDICINE

## 2022-12-01 PROCEDURE — 82550 ASSAY OF CK (CPK): CPT

## 2022-12-01 PROCEDURE — 3079F DIAST BP 80-89 MM HG: CPT | Performed by: FAMILY MEDICINE

## 2022-12-01 PROCEDURE — 80053 COMPREHEN METABOLIC PANEL: CPT

## 2022-12-01 PROCEDURE — 3074F SYST BP LT 130 MM HG: CPT | Performed by: FAMILY MEDICINE

## 2022-12-01 PROCEDURE — 99214 OFFICE O/P EST MOD 30 MIN: CPT | Performed by: FAMILY MEDICINE

## 2022-12-01 PROCEDURE — 83735 ASSAY OF MAGNESIUM: CPT

## 2022-12-01 RX ORDER — PREDNISONE 10 MG/1
TABLET ORAL
Qty: 30 TABLET | Refills: 0 | Status: SHIPPED | OUTPATIENT
Start: 2022-12-01

## 2022-12-07 RX ORDER — DUPILUMAB 300 MG/2ML
INJECTION, SOLUTION SUBCUTANEOUS
Qty: 4 EACH | Refills: 0 | Status: SHIPPED | OUTPATIENT
Start: 2022-12-07

## 2022-12-12 ENCOUNTER — TELEMEDICINE (OUTPATIENT)
Dept: ALLERGY | Facility: CLINIC | Age: 47
End: 2022-12-12

## 2022-12-12 DIAGNOSIS — Z88.6 ALLERGY TO NONSTEROIDAL ANTI-INFLAMMATORY DRUG (NSAID): ICD-10-CM

## 2022-12-12 DIAGNOSIS — J45.40 MODERATE PERSISTENT EXTRINSIC ASTHMA WITHOUT COMPLICATION: ICD-10-CM

## 2022-12-12 DIAGNOSIS — Z23 FLU VACCINE NEED: ICD-10-CM

## 2022-12-12 DIAGNOSIS — J30.9 ALLERGIC RHINITIS, UNSPECIFIED SEASONALITY, UNSPECIFIED TRIGGER: ICD-10-CM

## 2022-12-12 DIAGNOSIS — Z92.29 COVID-19 VACCINE SERIES COMPLETED: ICD-10-CM

## 2022-12-12 DIAGNOSIS — J33.9 NASAL POLYP: ICD-10-CM

## 2022-12-12 DIAGNOSIS — J32.2 CHRONIC ETHMOIDAL SINUSITIS: Primary | ICD-10-CM

## 2022-12-12 RX ORDER — ALBUTEROL SULFATE 90 UG/1
2 AEROSOL, METERED RESPIRATORY (INHALATION) EVERY 6 HOURS PRN
Qty: 3 EACH | Refills: 0 | Status: SHIPPED | OUTPATIENT
Start: 2022-12-12

## 2022-12-12 NOTE — PATIENT INSTRUCTIONS
#1 chronic sinusitis with nasal polyposis  Overall much improved with Dupixent. Sense of taste and smell have returned to a point  Denies recurrent sinus infections antibiotics or steroids in the interim. Overall 2 patient has helped decreasing symptoms  No sinus surgeries in the interim  Denies side effects with Dupixent  Continue with current regimen  Continue with Dupixent every 2 weeks, may consider Flonase 2 sprays per nostril once a day as well    #2 asthma  No ED visits or prednisone in the interim. Denies symptoms more than 2 days/week  Related patient for underlying nasal polyps may also help prevent asthma symptoms  Reviewed signs and symptoms of persistent asthma including the rules of 2    #3 allergic rhinitis  Continue with Zyrtec. Add Flonase if refractory.   Reviewed avoidance measures and potential treatment option of immunotherapy    #4 COVID vaccinations up-to-date    #5 flu vaccine recommended    #6 pneumonia vaccine recommended with Prevnar 20

## 2023-01-06 ENCOUNTER — TELEPHONE (OUTPATIENT)
Dept: SURGERY | Facility: CLINIC | Age: 48
End: 2023-01-06

## 2023-01-06 NOTE — TELEPHONE ENCOUNTER
[place] [place]/ Dr Rosa Blount; she was last seen 2021 oncology and is coming back due to headaches at nights; pt wondering if she should get an MRI before her appt 1/31 in Oncology w/ Dr Rosa Blount; please advise

## 2023-01-07 ENCOUNTER — APPOINTMENT (OUTPATIENT)
Dept: CT IMAGING | Facility: HOSPITAL | Age: 48
End: 2023-01-07
Attending: EMERGENCY MEDICINE
Payer: COMMERCIAL

## 2023-01-07 ENCOUNTER — HOSPITAL ENCOUNTER (EMERGENCY)
Facility: HOSPITAL | Age: 48
Discharge: HOME | End: 2023-01-07
Attending: EMERGENCY MEDICINE
Payer: COMMERCIAL

## 2023-01-07 VITALS
OXYGEN SATURATION: 98 % | DIASTOLIC BLOOD PRESSURE: 75 MMHG | BODY MASS INDEX: 27.46 KG/M2 | SYSTOLIC BLOOD PRESSURE: 111 MMHG | HEIGHT: 63 IN | TEMPERATURE: 98 F | RESPIRATION RATE: 17 BRPM | HEART RATE: 73 BPM | WEIGHT: 155 LBS

## 2023-01-07 DIAGNOSIS — R51.9 NONINTRACTABLE HEADACHE, UNSPECIFIED CHRONICITY PATTERN, UNSPECIFIED HEADACHE TYPE: Primary | ICD-10-CM

## 2023-01-07 LAB
ALBUMIN SERPL-MCNC: 3.5 G/DL (ref 3.4–5)
ALBUMIN/GLOB SERPL: 0.9 {RATIO} (ref 1–2)
ALP LIVER SERPL-CCNC: 64 U/L
ALT SERPL-CCNC: 75 U/L
ANION GAP SERPL CALC-SCNC: 2 MMOL/L (ref 0–18)
AST SERPL-CCNC: 54 U/L (ref 15–37)
BASOPHILS # BLD AUTO: 0.11 X10(3) UL (ref 0–0.2)
BASOPHILS NFR BLD AUTO: 1.7 %
BILIRUB SERPL-MCNC: 0.4 MG/DL (ref 0.1–2)
BUN BLD-MCNC: 14 MG/DL (ref 7–18)
CALCIUM BLD-MCNC: 8.8 MG/DL (ref 8.5–10.1)
CHLORIDE SERPL-SCNC: 111 MMOL/L (ref 98–112)
CO2 SERPL-SCNC: 23 MMOL/L (ref 21–32)
CREAT BLD-MCNC: 0.7 MG/DL
EOSINOPHIL # BLD AUTO: 0.7 X10(3) UL (ref 0–0.7)
EOSINOPHIL NFR BLD AUTO: 11.1 %
ERYTHROCYTE [DISTWIDTH] IN BLOOD BY AUTOMATED COUNT: 13.7 %
FLUAV + FLUBV RNA SPEC NAA+PROBE: NEGATIVE
FLUAV + FLUBV RNA SPEC NAA+PROBE: NEGATIVE
GFR SERPLBLD BASED ON 1.73 SQ M-ARVRAT: 107 ML/MIN/1.73M2 (ref 60–?)
GLOBULIN PLAS-MCNC: 3.8 G/DL (ref 2.8–4.4)
GLUCOSE BLD-MCNC: 87 MG/DL (ref 70–99)
HCT VFR BLD AUTO: 45.8 %
HGB BLD-MCNC: 15.2 G/DL
IMM GRANULOCYTES # BLD AUTO: 0.02 X10(3) UL (ref 0–1)
IMM GRANULOCYTES NFR BLD: 0.3 %
LYMPHOCYTES # BLD AUTO: 1.41 X10(3) UL (ref 1–4)
LYMPHOCYTES NFR BLD AUTO: 22.4 %
MCH RBC QN AUTO: 29.8 PG (ref 26–34)
MCHC RBC AUTO-ENTMCNC: 33.2 G/DL (ref 31–37)
MCV RBC AUTO: 89.8 FL
MONOCYTES # BLD AUTO: 0.38 X10(3) UL (ref 0.1–1)
MONOCYTES NFR BLD AUTO: 6 %
NEUTROPHILS # BLD AUTO: 3.67 X10 (3) UL (ref 1.5–7.7)
NEUTROPHILS # BLD AUTO: 3.67 X10(3) UL (ref 1.5–7.7)
NEUTROPHILS NFR BLD AUTO: 58.5 %
OSMOLALITY SERPL CALC.SUM OF ELEC: 282 MOSM/KG (ref 275–295)
PLATELET # BLD AUTO: 248 10(3)UL (ref 150–450)
POTASSIUM SERPL-SCNC: 4 MMOL/L (ref 3.5–5.1)
PROT SERPL-MCNC: 7.3 G/DL (ref 6.4–8.2)
RBC # BLD AUTO: 5.1 X10(6)UL
RSV RNA SPEC NAA+PROBE: NEGATIVE
SARS-COV-2 RNA RESP QL NAA+PROBE: NOT DETECTED
SODIUM SERPL-SCNC: 136 MMOL/L (ref 136–145)
WBC # BLD AUTO: 6.3 X10(3) UL (ref 4–11)

## 2023-01-07 PROCEDURE — 96375 TX/PRO/DX INJ NEW DRUG ADDON: CPT

## 2023-01-07 PROCEDURE — 99285 EMERGENCY DEPT VISIT HI MDM: CPT

## 2023-01-07 PROCEDURE — 0241U SARS-COV-2/FLU A AND B/RSV BY PCR (GENEXPERT): CPT | Performed by: EMERGENCY MEDICINE

## 2023-01-07 PROCEDURE — 85025 COMPLETE CBC W/AUTO DIFF WBC: CPT | Performed by: EMERGENCY MEDICINE

## 2023-01-07 PROCEDURE — 70450 CT HEAD/BRAIN W/O DYE: CPT | Performed by: EMERGENCY MEDICINE

## 2023-01-07 PROCEDURE — 96365 THER/PROPH/DIAG IV INF INIT: CPT

## 2023-01-07 PROCEDURE — 80053 COMPREHEN METABOLIC PANEL: CPT | Performed by: EMERGENCY MEDICINE

## 2023-01-07 RX ORDER — MAGNESIUM SULFATE 1 G/100ML
1 INJECTION INTRAVENOUS ONCE
Status: COMPLETED | OUTPATIENT
Start: 2023-01-07 | End: 2023-01-07

## 2023-01-07 RX ORDER — DEXAMETHASONE SODIUM PHOSPHATE 10 MG/ML
10 INJECTION, SOLUTION INTRAMUSCULAR; INTRAVENOUS ONCE
Status: COMPLETED | OUTPATIENT
Start: 2023-01-07 | End: 2023-01-07

## 2023-01-07 RX ORDER — METOCLOPRAMIDE HYDROCHLORIDE 5 MG/ML
10 INJECTION INTRAMUSCULAR; INTRAVENOUS ONCE
Status: COMPLETED | OUTPATIENT
Start: 2023-01-07 | End: 2023-01-07

## 2023-01-07 RX ORDER — HALOPERIDOL 5 MG/ML
5 INJECTION INTRAMUSCULAR ONCE
Status: COMPLETED | OUTPATIENT
Start: 2023-01-07 | End: 2023-01-07

## 2023-01-07 RX ORDER — HYDROCODONE BITARTRATE AND ACETAMINOPHEN 5; 325 MG/1; MG/1
1-2 TABLET ORAL EVERY 6 HOURS PRN
Qty: 10 TABLET | Refills: 0 | Status: SHIPPED | OUTPATIENT
Start: 2023-01-07 | End: 2023-01-12

## 2023-01-07 RX ORDER — CAFFEINE 200 MG
200 TABLET ORAL ONCE
Status: COMPLETED | OUTPATIENT
Start: 2023-01-07 | End: 2023-01-07

## 2023-01-07 RX ORDER — DIPHENHYDRAMINE HYDROCHLORIDE 50 MG/ML
25 INJECTION INTRAMUSCULAR; INTRAVENOUS ONCE
Status: COMPLETED | OUTPATIENT
Start: 2023-01-07 | End: 2023-01-07

## 2023-01-07 NOTE — ED INITIAL ASSESSMENT (HPI)
Migraine for the last 2-3 days. Denies any hx of migraines. States she has been taking tylenol at home but there is no relief. States the migraine is worse when she lays down.

## 2023-01-10 ENCOUNTER — PATIENT OUTREACH (OUTPATIENT)
Dept: CASE MANAGEMENT | Age: 48
End: 2023-01-10

## 2023-01-10 ENCOUNTER — OFFICE VISIT (OUTPATIENT)
Dept: INTERNAL MEDICINE CLINIC | Facility: CLINIC | Age: 48
End: 2023-01-10
Payer: COMMERCIAL

## 2023-01-10 VITALS
WEIGHT: 154 LBS | SYSTOLIC BLOOD PRESSURE: 118 MMHG | DIASTOLIC BLOOD PRESSURE: 60 MMHG | HEART RATE: 70 BPM | BODY MASS INDEX: 26.29 KG/M2 | RESPIRATION RATE: 16 BRPM | HEIGHT: 64 IN

## 2023-01-10 DIAGNOSIS — E66.3 OVERWEIGHT (BMI 25.0-29.9): ICD-10-CM

## 2023-01-10 DIAGNOSIS — Z72.3 LACK OF PHYSICAL EXERCISE: ICD-10-CM

## 2023-01-10 DIAGNOSIS — Z51.81 ENCOUNTER FOR THERAPEUTIC DRUG MONITORING: Primary | ICD-10-CM

## 2023-01-10 PROCEDURE — 3008F BODY MASS INDEX DOCD: CPT | Performed by: INTERNAL MEDICINE

## 2023-01-10 PROCEDURE — 99214 OFFICE O/P EST MOD 30 MIN: CPT | Performed by: INTERNAL MEDICINE

## 2023-01-10 PROCEDURE — 3074F SYST BP LT 130 MM HG: CPT | Performed by: INTERNAL MEDICINE

## 2023-01-10 PROCEDURE — 3078F DIAST BP <80 MM HG: CPT | Performed by: INTERNAL MEDICINE

## 2023-01-10 RX ORDER — PHENTERMINE HYDROCHLORIDE 37.5 MG/1
37.5 TABLET ORAL
Qty: 30 TABLET | Refills: 2 | Status: SHIPPED | OUTPATIENT
Start: 2023-01-10

## 2023-01-10 RX ORDER — SEMAGLUTIDE 1.34 MG/ML
1 INJECTION, SOLUTION SUBCUTANEOUS WEEKLY
Qty: 3 EACH | Refills: 1 | Status: SHIPPED | OUTPATIENT
Start: 2023-01-10

## 2023-01-10 NOTE — TELEPHONE ENCOUNTER
Received feedback from DEONDRE Covington. Informed patient that she may schedule with Neurology to assist in managing headaches. Messaged patient via [employer] and provided feedback.

## 2023-01-10 NOTE — TELEPHONE ENCOUNTER
Pt had CT in the ED which Dr. Rosa Blount can review at her next appointment. It appears stable from her previous MRI. General neurology is typically who works up and treats headaches.   I would defer prescriptions to either her PCP or general neurology as neurosurgery does not treat headache disorders

## 2023-01-24 RX ORDER — DUPILUMAB 300 MG/2ML
1 INJECTION, SOLUTION SUBCUTANEOUS
Qty: 4 EACH | Refills: 2 | Status: SHIPPED | OUTPATIENT
Start: 2023-01-24

## 2023-02-01 RX ORDER — DUPILUMAB 300 MG/2ML
INJECTION, SOLUTION SUBCUTANEOUS
Qty: 4 EACH | Refills: 0 | Status: SHIPPED | OUTPATIENT
Start: 2023-02-01

## 2023-03-20 NOTE — TELEPHONE ENCOUNTER
Left a message and sent a [employer] message for patient to contact our office to discuss with patient refill request for Albuterol inhaler.

## 2023-03-21 RX ORDER — ALBUTEROL SULFATE 90 UG/1
AEROSOL, METERED RESPIRATORY (INHALATION)
Qty: 25.5 EACH | Refills: 0 | OUTPATIENT
Start: 2023-03-21

## 2023-03-25 ENCOUNTER — PATIENT MESSAGE (OUTPATIENT)
Dept: ALLERGY | Facility: CLINIC | Age: 48
End: 2023-03-25

## 2023-04-12 ENCOUNTER — TELEMEDICINE (OUTPATIENT)
Dept: INTERNAL MEDICINE CLINIC | Facility: CLINIC | Age: 48
End: 2023-04-12
Payer: COMMERCIAL

## 2023-04-12 DIAGNOSIS — Z51.81 ENCOUNTER FOR THERAPEUTIC DRUG MONITORING: Primary | ICD-10-CM

## 2023-04-12 DIAGNOSIS — E66.3 OVERWEIGHT (BMI 25.0-29.9): ICD-10-CM

## 2023-04-12 PROCEDURE — 99213 OFFICE O/P EST LOW 20 MIN: CPT | Performed by: INTERNAL MEDICINE

## 2023-04-13 RX ORDER — DUPILUMAB 300 MG/2ML
1 INJECTION, SOLUTION SUBCUTANEOUS
Qty: 4 ML | Refills: 2 | Status: SHIPPED | OUTPATIENT
Start: 2023-04-13

## 2023-04-25 RX ORDER — DUPILUMAB 300 MG/2ML
INJECTION, SOLUTION SUBCUTANEOUS
Qty: 4 EACH | Refills: 2 | Status: SHIPPED | OUTPATIENT
Start: 2023-04-25

## 2023-05-04 DIAGNOSIS — J30.2 SEASONAL ALLERGIES: ICD-10-CM

## 2023-05-05 RX ORDER — CETIRIZINE HYDROCHLORIDE 10 MG/1
TABLET ORAL
Qty: 90 TABLET | Refills: 0 | Status: SHIPPED | OUTPATIENT
Start: 2023-05-05

## 2023-05-15 ENCOUNTER — OFFICE VISIT (OUTPATIENT)
Dept: FAMILY MEDICINE CLINIC | Facility: CLINIC | Age: 48
End: 2023-05-15
Payer: COMMERCIAL

## 2023-05-15 VITALS
SYSTOLIC BLOOD PRESSURE: 100 MMHG | WEIGHT: 160 LBS | HEART RATE: 90 BPM | BODY MASS INDEX: 27.31 KG/M2 | HEIGHT: 64 IN | TEMPERATURE: 97 F | RESPIRATION RATE: 14 BRPM | DIASTOLIC BLOOD PRESSURE: 76 MMHG

## 2023-05-15 DIAGNOSIS — J01.00 ACUTE NON-RECURRENT MAXILLARY SINUSITIS: Primary | ICD-10-CM

## 2023-05-15 PROCEDURE — 99214 OFFICE O/P EST MOD 30 MIN: CPT | Performed by: FAMILY MEDICINE

## 2023-05-15 PROCEDURE — 3008F BODY MASS INDEX DOCD: CPT | Performed by: FAMILY MEDICINE

## 2023-05-15 PROCEDURE — 3078F DIAST BP <80 MM HG: CPT | Performed by: FAMILY MEDICINE

## 2023-05-15 PROCEDURE — 3074F SYST BP LT 130 MM HG: CPT | Performed by: FAMILY MEDICINE

## 2023-05-15 RX ORDER — DOXYCYCLINE HYCLATE 100 MG
100 TABLET ORAL 2 TIMES DAILY
Qty: 20 TABLET | Refills: 0 | Status: SHIPPED | OUTPATIENT
Start: 2023-05-15 | End: 2023-05-25

## 2023-06-07 DIAGNOSIS — E03.9 ACQUIRED HYPOTHYROIDISM: ICD-10-CM

## 2023-06-08 RX ORDER — LEVOTHYROXINE SODIUM 137 UG/1
TABLET ORAL
Qty: 90 TABLET | Refills: 0 | Status: SHIPPED | OUTPATIENT
Start: 2023-06-08

## 2023-06-09 ENCOUNTER — PATIENT MESSAGE (OUTPATIENT)
Dept: FAMILY MEDICINE CLINIC | Facility: CLINIC | Age: 48
End: 2023-06-09

## 2023-07-10 ENCOUNTER — TELEMEDICINE (OUTPATIENT)
Dept: INTERNAL MEDICINE CLINIC | Facility: CLINIC | Age: 48
End: 2023-07-10
Payer: COMMERCIAL

## 2023-07-10 DIAGNOSIS — E66.3 OVERWEIGHT (BMI 25.0-29.9): ICD-10-CM

## 2023-07-10 DIAGNOSIS — Z51.81 ENCOUNTER FOR THERAPEUTIC DRUG MONITORING: Primary | ICD-10-CM

## 2023-07-10 RX ORDER — SEMAGLUTIDE 2.68 MG/ML
2 INJECTION, SOLUTION SUBCUTANEOUS WEEKLY
Qty: 9 ML | Refills: 1 | Status: SHIPPED | OUTPATIENT
Start: 2023-07-10

## 2023-07-10 RX ORDER — PHENTERMINE HYDROCHLORIDE 37.5 MG/1
37.5 TABLET ORAL
Qty: 30 TABLET | Refills: 2 | Status: SHIPPED
Start: 2023-07-10

## 2023-07-10 NOTE — PROGRESS NOTES
HISTORY OF PRESENT ILLNESS  Patient presents with:  Weight Check: Colton Bolanos is a 49year old female here for follow up in medical weight loss program.     Here for follow up       Currently at 151 lb     Goal: 145 lb     Felt that the phentemine was starting develop a tolerance with medication then was able to get back on track with restart. Does feel that she gets bored or stuck of certain foods. Has been doing eggs and cereal in the morning   Does salads in the monring with ranch  Cooks chicken at home.    Not eating fast food         Wt Readings from Last 6 Encounters:  05/15/23 : 160 lb (72.6 kg)  01/10/23 : 154 lb (69.9 kg)  01/07/23 : 155 lb (70.3 kg)  12/01/22 : 149 lb (67.6 kg)  11/29/22 : 150 lb (68 kg)  11/03/22 : 158 lb (71.7 kg)           REVIEW OF SYSTEMS  GENERAL HEALTH: feels well otherwise, denied any fevers chills or night sweats   RESPIRATORY: denies shortness of breath   CARDIOVASCULAR: denies chest pain  GI: denies abdominal pain    EXAM  LMP 01/02/2023   GENERAL: well developed, well nourished,in no apparent distress, A/O x3  SKIN: no rashes,no suspicious lesions  HEENT: atraumatic, normocephalic, OP-clear, PERRL  NECK: supple,no adenopathy  LUNGS: clear to auscultation bilaterally   CARDIO: RRR without murmur  GI: good BS's,NT/ND, no masses or HSM  EXTREMITIES: no cyanosis, no clubbing, no edema    Lab Results   Component Value Date    WBC 6.3 01/07/2023    RBC 5.10 01/07/2023    HGB 15.2 01/07/2023    HCT 45.8 01/07/2023    MCV 89.8 01/07/2023    MCH 29.8 01/07/2023    MCHC 33.2 01/07/2023    RDW 13.7 01/07/2023    .0 01/07/2023    MPV 11.1 10/20/2012     Lab Results   Component Value Date    GLU 87 01/07/2023    BUN 14 01/07/2023    BUNCREA 10.8 12/01/2022    CREATSERUM 0.70 01/07/2023    ANIONGAP 2 01/07/2023    GFR 74 01/27/2018    GFRNAA 97 09/11/2021    GFRAA 112 09/11/2021    CA 8.8 01/07/2023    OSMOCALC 282 01/07/2023    ALKPHO 64 01/07/2023    AST 54 (H) 01/07/2023    ALT 75 (H) 01/07/2023    BILT 0.4 01/07/2023    TP 7.3 01/07/2023    ALB 3.5 01/07/2023    GLOBULIN 3.8 01/07/2023    AGRATIO 1.7 01/17/2015     01/07/2023    K 4.0 01/07/2023     01/07/2023    CO2 23.0 01/07/2023     Lab Results   Component Value Date    EAG 85 08/31/2020    A1C 4.6 08/31/2020     Lab Results   Component Value Date    CHOLEST 167 11/10/2022    TRIG 67 11/10/2022    HDL 48 11/10/2022     (H) 11/10/2022    VLDL 11 11/10/2022    TCHDLRATIO 2.74 01/27/2018    NONHDLC 119 11/10/2022     Lab Results   Component Value Date    T4F 1.3 11/10/2022    TSH 2.010 11/10/2022    TSHT4 0.04 (L) 10/21/2015     Lab Results   Component Value Date    B12 472 01/27/2018     Lab Results   Component Value Date    VITD 26.9 (L) 01/27/2018       LEVOTHYROXINE 137 MCG Oral Tab, TAKE 1 TABLET BY MOUTH IN THE MORNING BEFORE BREAKFAST, Disp: 90 tablet, Rfl: 0  CETIRIZINE 10 MG Oral Tab, TAKE 1 TABLET BY MOUTH EVERY DAY, Disp: 90 tablet, Rfl: 0  DUPIXENT 300 MG/2ML Subcutaneous Solution Pen-injector, INJECT 300 MG UNDER THE SKIN (SUBCUTANEOUS INJECTION) EVERY 14 DAYS, Disp: 4 each, Rfl: 2  semaglutide 8 MG/3ML Subcutaneous Solution Pen-injector, Inject 2 mg into the skin once a week., Disp: 9 mL, Rfl: 0  Phentermine HCl 37.5 MG Oral Tab, Take 1 tablet (37.5 mg total) by mouth before breakfast., Disp: 30 tablet, Rfl: 2  albuterol (PROAIR HFA) 108 (90 Base) MCG/ACT Inhalation Aero Soln, Inhale 2 puffs into the lungs every 6 (six) hours as needed for Wheezing., Disp: 3 each, Rfl: 0  triamcinolone 0.1 % External Ointment, , Disp: , Rfl:   predniSONE 10 MG Oral Tab, 4 tabs by mouth daily x4 days, 3 tabs by mouth daily x3 days, 2 tabs by mouth daily x2 days, 1 tab by mouth daily x1 day, Disp: 30 tablet, Rfl: 0  Insulin Pen Needle (PEN NEEDLES) 32G X 4 MM Does not apply Misc, Inject 1 each into the skin daily. , Disp: 100 each, Rfl: 1  diazePAM (VALIUM) 2 MG Oral Tab, Take 1 tablet (2 mg total) by mouth 3 (three) times daily as needed for Anxiety. , Disp: 10 tablet, Rfl: 0  albuterol 108 (90 Base) MCG/ACT Inhalation Aero Soln, Inhale 2 puffs into the lungs every 4 (four) hours as needed for Wheezing., Disp: 3 each, Rfl: 1  BUPROPION  MG Oral Tablet 12 Hr, TAKE 1 TABLET(150 MG) BY MOUTH TWICE DAILY, Disp: 180 tablet, Rfl: 0  Levonorgestrel-Ethinyl Estrad (AVIANE) 0.1-20 MG-MCG Oral Tab, Take 1 tablet by mouth daily. , Disp: 84 tablet, Rfl: 3  AVIANE 0.1-20 MG-MCG Oral Tab, Take 1 tablet by mouth once daily, Disp: 28 tablet, Rfl: 0  DUPIXENT 300 MG/2ML Subcutaneous Solution Prefilled Syringe, INJECT 1 SYRINGE INTO THE SKIN EVERY 14 (FOURTEEN) DAYS., Disp: 4 mL, Rfl: 0  triamcinolone 0.1 % External Cream, Apply topically 2 (two) times daily as needed. , Disp: 60 g, Rfl: 3  topiramate 100 MG Oral Tab, Take 1 tablet (100 mg total) by mouth 2 (two) times daily. , Disp: 180 tablet, Rfl: 0  albuterol sulfate (2.5 MG/3ML) 0.083% Inhalation Nebu Soln, Take 3 mL (2.5 mg total) by nebulization every 4 (four) hours as needed for Wheezing., Disp: 1 Box, Rfl: 1    No current facility-administered medications on file prior to visit. ASSESSMENT  Analyzed weight data:       Diagnoses and all orders for this visit:    Encounter for therapeutic drug monitoring    Overweight (BMI 25.0-29. 9)    Other orders  -     Phentermine HCl 37.5 MG Oral Tab; Take 1 tablet (37.5 mg total) by mouth every morning before breakfast.  -     semaglutide (OZEMPIC, 2 MG/DOSE,) 8 MG/3ML Subcutaneous Solution Pen-injector; Inject 2 mg into the skin once a week.         PLAN  Initial consult: 8/2/16 with [address]   Weight max 4/3/19 on 179 lb    Hold wegovy due to national shortage  Continue ozempic trial of 2 mg q weekly  -advised of side effects and adverse effects of this medication  Continue phentermine 37.5 mg q day   -advised of side effects and adverse effects of this medication  Tried saxenda in the past   Continue topamax and wellbutrin xl 150 Needs to track nutrition, increase vegetables / protein especially   No response  trial of phendimetrazine   Continue bupropion, stress is doing much better  Holding metformin at this time  We reviewed limitations of medication management without lifestyle adjustment   Nutrition: low carb diet/ recommended to eat breakfast daily/ regular protein intake  Medication use and side effects reviewed with patient. Medication contraindications: n/a   Follow up with dietitian and psychologist as recommended. Discussed the role of sleep and stress in weight management. Counseled on comprehensive weight loss plan including attention to nutrition, exercise and behavior/stress management for success. See patient instruction below for more details. Discussed strategies to overcome barriers to successful weight loss and weight maintenance  FITTE: ACSM recommendations (150-300 minutes/ week in active weight loss)    Patient Instructions   [email or web address]       No follow-ups on file. Patient verbalizes understanding.     Leon Cifuentes MD

## 2023-07-26 ENCOUNTER — MED REC SCAN ONLY (OUTPATIENT)
Dept: FAMILY MEDICINE CLINIC | Facility: CLINIC | Age: 48
End: 2023-07-26

## 2023-07-27 NOTE — TELEPHONE ENCOUNTER
ANTHONYB on patient's voicemail. [employer] (deja mi) message sent as below . . . Chiarajoanna Diaz,     The Allergy Office received a refill request for Dupixent. Due to Dr. Flavia Stewart medication refill policy, we are unable to refill the medication unless you have been seen within the last 6 months or have a future physician follow-up appointment within the next 3 months. Please call the Allergy Office at [phone] to schedule an Allergy Physician Follow-Up Appointment or do so through [address] Once the appointment is scheduled, a new Dupixent Pen prescription will be sent to the pharmacy. Enio Calderón, RN      Fax received from [facility] asking for refill of Dupixent Pen. Patient last seen in Allergy 12/22/2023 (telemedicine) for . . .    Chronic ethmoidal sinusitis  (primary encounter diagnosis)  Nasal polyp  Moderate persistent extrinsic asthma without complication  Allergic rhinitis, unspecified seasonality, unspecified trigger  Covid-19 vaccine series completed  Flu vaccine need  Allergy to nonsteroidal anti-inflammatory drug (nsaid)    Requested Prescriptions   Pending Prescriptions Disp Refills    Dupilumab (DUPIXENT) 300 MG/2ML Subcutaneous Solution Pen-injector 4 each 2     Sig: Inject 300 mg into the skin every 14 (fourteen) days.        Biologic Medications Failed - 7/27/2023  1:48 PM        Failed - Appt in past 6 mos or next 3 mos     Recent Outpatient Visits              2 weeks ago Encounter for therapeutic drug monitoring    [facility], [address] [facility], Livier Lau MD    Telemedicine    2 months ago Acute non-recurrent maxillary sinusitis    [facility], Dano Gonsalves DO    Office Visit    3 months ago Encounter for therapeutic drug monitoring    [facility], [address], Loi Felipe MD    Telemedicine    6 months ago Encounter for therapeutic drug monitoring    [address], [place], Angella Sidhu MD    Office Visit    7 months ago Chronic ethmoidal sinusitis    [facility], [facility], Dayanna Sanchez MD    Telemedicine          Future Appointments         Provider Department Appt Notes    In 2 months Alba Garber MD [address], [place]

## 2023-08-03 RX ORDER — DUPILUMAB 300 MG/2ML
300 INJECTION, SOLUTION SUBCUTANEOUS
Qty: 4 EACH | Refills: 2 | Status: SHIPPED | OUTPATIENT
Start: 2023-08-03

## 2023-08-03 NOTE — TELEPHONE ENCOUNTER
Patient last seen in Allergy 12/12/2022 for . . .        Chronic ethmoidal sinusitis  (primary encounter diagnosis)  Nasal polyp  Moderate persistent extrinsic asthma without complication  Allergic rhinitis, unspecified seasonality, unspecified trigger  Covid-19 vaccine series completed  Flu vaccine need  Allergy to nonsteroidal anti-inflammatory drug (nsaid)      Next Allergy f/u appt scheduled for 10/7/2023. Please advise on refill request.     Requested Prescriptions   Pending Prescriptions Disp Refills    Dupilumab (DUPIXENT) 300 MG/2ML Subcutaneous Solution Pen-injector 4 each 2     Sig: Inject 300 mg into the skin every 14 (fourteen) days.        Biologic Medications Failed - 7/27/2023  1:48 PM        Failed - Appt in past 6 mos or next 3 mos     Recent Outpatient Visits              3 weeks ago Encounter for therapeutic drug monitoring    [facility] Pooja Travsi, Larissa Nolan MD    Telemedicine    2 months ago Acute non-recurrent maxillary sinusitis    [facility] [place] Arnie Revering, DO    Office Visit    3 months ago Encounter for therapeutic drug monitoring    [facility], [address], Larissa Cheema MD    Telemedicine    6 months ago Encounter for therapeutic drug monitoring    [facility], [address], Shruthi Melton MD    Office Visit    7 months ago Chronic ethmoidal sinusitis    [facility], [address] [place] [address] Elizabeth Schaefer MD    Telemedicine          Future Appointments         Provider Department Appt Notes    In 2 months MD [facility] [place] [facility] up    In 2 months MD Enoch Fernandes [place]

## 2023-08-10 ENCOUNTER — PATIENT MESSAGE (OUTPATIENT)
Dept: INTERNAL MEDICINE CLINIC | Facility: CLINIC | Age: 48
End: 2023-08-10

## 2023-08-10 RX ORDER — DUPILUMAB 300 MG/2ML
300 INJECTION, SOLUTION SUBCUTANEOUS
Qty: 4 EACH | Refills: 0 | Status: SHIPPED | OUTPATIENT
Start: 2023-08-10

## 2023-08-10 NOTE — TELEPHONE ENCOUNTER
Requested Prescriptions   Pending Prescriptions Disp Refills    DUPIXENT 300 MG/2ML Subcutaneous Solution Pen-injector [Pharmacy Med Name: Kyleview 300 MG/2ML SOLUTION PEN-INJECTOR] 4 each 0     Sig: INJECT 300 MG UNDER THE SKIN (SUBCUTANEOUS INJECTION) EVERY 14 DAYS       Biologic Medications Passed - 8/9/2023  2:18 AM        Passed - Appt in past 6 mos or next 3 mos     Recent Outpatient Visits              1 month ago Encounter for therapeutic drug monitoring    [facility], [address] [place] [address], [place], Kendrick Gardner MD    Telemedicine    2 months ago Acute non-recurrent maxillary sinusitis    [facility], [place], [address] [place] [address] [facility]     Office Visit    4 months ago Encounter for therapeutic drug monitoring    [address], [address] [place] [address], Ashley Marquez MD    Telemedicine    7 months ago Encounter for therapeutic drug monitoring    [address], [address] [place] [address], Ashley Marquez MD    Office Visit    8 months ago Chronic ethmoidal sinusitis    [facility], [address] [place], [place] [address] Calvin Álvarez MD    Telemedicine          Future Appointments         Provider Department Appt Notes    In 1 month Calvin Álvarez MD [address],[place] [address], [address] [place], [place] [address]rod Era Follow up    In 2 months Zakia Rangel MD [address], Jennifer Christianson                   Refilled per med refill protocol.

## 2023-08-18 ENCOUNTER — TELEPHONE (OUTPATIENT)
Dept: SURGERY | Facility: CLINIC | Age: 48
End: 2023-08-18

## 2023-08-18 DIAGNOSIS — D32.9 MENINGIOMA (HCC): Primary | ICD-10-CM

## 2023-08-18 NOTE — TELEPHONE ENCOUNTER
Pt states is due for MRI Brain due to meningioma is asking for order to be placed for imaging so she can get it done and schedule fu appointment please advise

## 2023-08-18 NOTE — TELEPHONE ENCOUNTER
Note from Dr. Sanjeev Banda 7.19.22 (on TE from PT message from 7/722): \"Called patient  Meningioma stable  Will repeat scan in 1 year  Patient to call for order\"    Routed to provider for order.

## 2023-08-29 ENCOUNTER — TELEPHONE (OUTPATIENT)
Dept: ALLERGY | Facility: CLINIC | Age: 48
End: 2023-08-29

## 2023-08-29 NOTE — TELEPHONE ENCOUNTER
Medication PA requested:     Dupilumab (DUPIXENT) 300 MG/2ML Subcutaneous Solution Pen-injector (Discontinued) 4 each 2 8/3/2023 8/10/2023   Sig:   Inject 300 mg into the skin every 14 (fourteen) days. Route:   Subcutaneous         Dx Code: J45.40    Description of Diagnosis: Moderate persistent extrinsic asthma without complication     Key or Insurance Telephone #:[employer] [phone]            CPT Code:      Case Number/Pending Referral #:      Pt. Authorization Number:     Pt.  Authorization Date:     Pt contacted/Pharmacy contacted if needed:

## 2023-08-30 NOTE — TELEPHONE ENCOUNTER
Brian Santos,    Last 2 Dupixent PA were approved for Diag code  J33.9 Chroinic rhinosinusitus with nasal polyposis. Can this code be used for this PA renewal? Please advise, thank you!

## 2023-08-31 NOTE — TELEPHONE ENCOUNTER
Medication PA requested:   Key: [id number]     Dupilumab (DUPIXENT) 300 MG/2ML Subcutaneous Solution Pen-injector (Discontinued) 4 each 2 8/3/2023 8/10/2023   Sig:   Inject 300 mg into the skin every 14 (fourteen) days.      Route:   Subcutaneous     Dx Code: J33.9  Description of Diagnosis: Moderate persistent extrinsic asthma without complication   Key or Insurance Telephone #:[employer] [phone]            Cover my meds pa submitted with LOV note 12/2022  Awaiting determination

## 2023-08-31 NOTE — TELEPHONE ENCOUNTER
Ignacio Hammonds. Yes please, use J33.9 Chroinic rhinosinusitus with nasal polyposis for the PA. Thank you for your help.

## 2023-08-31 NOTE — TELEPHONE ENCOUNTER
Dupixent PA approval letter received from [employer].        Dupixent 300 mg/ 2 mL prefilled pen    Effective Dates:    9/19/2023 - 9/19/2024

## 2023-09-07 DIAGNOSIS — E03.9 ACQUIRED HYPOTHYROIDISM: ICD-10-CM

## 2023-09-07 RX ORDER — LEVOTHYROXINE SODIUM 137 UG/1
137 TABLET ORAL
Qty: 90 TABLET | Refills: 0 | Status: SHIPPED | OUTPATIENT
Start: 2023-09-07

## 2023-09-07 RX ORDER — PHENTERMINE HYDROCHLORIDE 37.5 MG/1
37.5 CAPSULE ORAL
Qty: 30 CAPSULE | Refills: 0 | OUTPATIENT
Start: 2023-09-07

## 2023-09-11 RX ORDER — DUPILUMAB 300 MG/2ML
1 INJECTION, SOLUTION SUBCUTANEOUS
Qty: 4 EACH | Refills: 2 | Status: SHIPPED | OUTPATIENT
Start: 2023-09-11

## 2023-09-11 NOTE — TELEPHONE ENCOUNTER
Patient last seen in Allergy 12/12/2022 for .  . .    Chronic ethmoidal sinusitis  (primary encounter diagnosis)  Nasal polyp  Moderate persistent extrinsic asthma without complication  Allergic rhinitis, unspecified seasonality, unspecified trigger  Covid-19 vaccine series completed  Flu vaccine need  Allergy to nonsteroidal anti-inflammatory drug (nsaid)    Requested Prescriptions   Pending Prescriptions Disp Refills    DUPIXENT 300 MG/2ML Subcutaneous Solution Pen-injector [Pharmacy Med Name: Kyleview 300 MG/2ML SOLUTION PEN-INJECTOR] 4 each 0     Sig: INJECT 1 PEN UNDER THE SKIN (SUBCUTANEOUS INJECTION) EVERY 2 WEEKS       Biologic Medications Passed - 9/10/2023  2:11 AM        Passed - Appt in past 6 mos or next 3 mos     Recent Outpatient Visits              2 months ago Encounter for therapeutic drug monitoring    [facility], [address] [place] [address], Neela Nixon MD    Telemedicine    3 months ago Acute non-recurrent maxillary sinusitis    [address] Jim Huber DO    Office Visit    5 months ago Encounter for therapeutic drug monitoring    [address] [place] [address], Delta Flores MD    Telemedicine    8 months ago Encounter for therapeutic drug monitoring    [facility], Delta Flores MD    Office Visit    9 months ago Chronic ethmoidal sinusitis    [address]    Telemedicine          Future Appointments         Provider Department Appt Notes    In 3 weeks Esha Cox MD [address] Follow up    In 1 month Heidi Kelly MD [facility], [address] [place] [address]                   Dupilumab (DUPIXENT) 300 MG/2ML Subcutaneous Solution Pen-injector 4 each 2 9/11/2023     Sig - Route: INJECT 1 PEN UNDER THE SKIN (SUBCUTANEOUS INJECTION) EVERY 2 WEEKS - Subcutaneous    Sent to pharmacy as: Dupixent 300 MG/2ML Subcutaneous Solution Pen-injector (Dupilumab)    E-Prescribing Status: Receipt confirmed by pharmacy (9/11/2023  8:21 AM CDT)      Pharmacy    [facility]) [address] [facility], [address] [phone], [phone]     Prescription as above sent to pharmacy per protocol.

## 2023-09-26 ENCOUNTER — TELEPHONE (OUTPATIENT)
Dept: FAMILY MEDICINE CLINIC | Facility: CLINIC | Age: 48
End: 2023-09-26

## 2023-09-26 DIAGNOSIS — M54.50 CHRONIC BILATERAL LOW BACK PAIN WITHOUT SCIATICA: Primary | ICD-10-CM

## 2023-09-26 DIAGNOSIS — G89.29 CHRONIC BILATERAL LOW BACK PAIN WITHOUT SCIATICA: Primary | ICD-10-CM

## 2023-09-26 NOTE — TELEPHONE ENCOUNTER
Patient requesting new referral for physical therapy. Patient states previous referral /closed before she was able to make appt. Please advise. Thank you.             Referral # Creation Date Referral Status Status Update    [id number] 2022 Closed 2023: Status History     Status Reason Referral Type Referral Reasons Referral Class   - Auto Closed Rehab Services none Internal     To Specialty To Provider To Location/Place of Service To Department   Rehabilitation none [facility] PHYSICAL THERAPY     To Vendor Referred By By Location/Place of Service By Department   none Alex Macias DO [facility] [address] [facility] [address] Start Date Expiration Date Referral Entered By   Routine 2022 Alex Macias DO

## 2023-09-30 DIAGNOSIS — J30.2 SEASONAL ALLERGIES: ICD-10-CM

## 2023-10-02 ENCOUNTER — PATIENT MESSAGE (OUTPATIENT)
Dept: FAMILY MEDICINE CLINIC | Facility: CLINIC | Age: 48
End: 2023-10-02

## 2023-10-02 DIAGNOSIS — J30.2 SEASONAL ALLERGIES: Primary | ICD-10-CM

## 2023-10-02 RX ORDER — CETIRIZINE HYDROCHLORIDE 10 MG/1
TABLET ORAL
Qty: 90 TABLET | Refills: 0 | Status: SHIPPED | OUTPATIENT
Start: 2023-10-02

## 2023-10-02 RX ORDER — PHENTERMINE HYDROCHLORIDE 37.5 MG/1
37.5 TABLET ORAL
Qty: 30 TABLET | Refills: 0 | Status: SHIPPED | OUTPATIENT
Start: 2023-10-02

## 2023-10-02 NOTE — TELEPHONE ENCOUNTER
From: Marquis Huang  To: Shelby DURAN  Sent: 10/2/2023 9:24 AM CDT  Subject: Referrals     Hi I tried requesting a referral last week through my chart but not sure if it went through. I have a appointment on sat with Dr Mt Turner and my referral I had  if you can please set up a new one.      Thanks  Marquis Huang

## 2023-10-02 NOTE — TELEPHONE ENCOUNTER
Requesting   Requested Prescriptions     Pending Prescriptions Disp Refills    Phentermine HCl 37.5 MG Oral Tab 30 tablet 2     Sig: Take 1 tablet (37.5 mg total) by mouth every morning before breakfast.       LOV: 7/10/23  RTC: not noted  Filled: 7/10/23 #30 with 2 refills    Future Appointments   Date Time Provider Adela Small   10/7/2023  9:45 AM [facility]   10/9/2023  6:30 PM Jeanine Fuller [facility] PHYS TH [place]   10/18/2023  1:40 PM Keisha Wright MD [facility] [address]   10/18/2023  6:45 PM Jeanine Fuller [facility] PHYS TH [place]   10/20/2023 10:00 PM PF MRI RM1 (1.5T) PF MRI [place]   10/30/2023  6:00 PM Jeanine Fuller [facility] PHYS TH [place]   11/6/2023  6:00 PM Jeanine Fuller [facility] PHYS TH [place]   11/13/2023  6:00 PM Jeanine Fuller [facility] PHYS TH [place]

## 2023-10-06 ENCOUNTER — TELEPHONE (OUTPATIENT)
Dept: PHYSICAL THERAPY | Facility: HOSPITAL | Age: 48
End: 2023-10-06

## 2023-10-07 ENCOUNTER — TELEMEDICINE (OUTPATIENT)
Dept: ALLERGY | Facility: CLINIC | Age: 48
End: 2023-10-07

## 2023-10-07 DIAGNOSIS — J33.9 NASAL POLYP: ICD-10-CM

## 2023-10-07 DIAGNOSIS — Z92.29 COVID-19 VACCINE SERIES COMPLETED: ICD-10-CM

## 2023-10-07 DIAGNOSIS — J45.40 MODERATE PERSISTENT EXTRINSIC ASTHMA WITHOUT COMPLICATION: ICD-10-CM

## 2023-10-07 DIAGNOSIS — J30.9 ALLERGIC RHINITIS, UNSPECIFIED SEASONALITY, UNSPECIFIED TRIGGER: ICD-10-CM

## 2023-10-07 DIAGNOSIS — Z23 FLU VACCINE NEED: ICD-10-CM

## 2023-10-07 DIAGNOSIS — J32.2 CHRONIC ETHMOIDAL SINUSITIS: Primary | ICD-10-CM

## 2023-10-07 PROCEDURE — 99214 OFFICE O/P EST MOD 30 MIN: CPT | Performed by: ALLERGY & IMMUNOLOGY

## 2023-10-07 RX ORDER — DOXYCYCLINE HYCLATE 100 MG/1
100 CAPSULE ORAL 2 TIMES DAILY
Qty: 20 CAPSULE | Refills: 0 | Status: SHIPPED | OUTPATIENT
Start: 2023-10-07 | End: 2023-10-17

## 2023-10-07 NOTE — PROGRESS NOTES
Piedad Sis is a 49year old female. HPI:   No chief complaint on file. Patient is a 74-year-old female who presents for follow-up via video visit    This visit is conducted using Telemedicine with live, interactive video and audio during this Coronavirus pandemic. Please note that the following visit was completed using two-way, real-time interactive audio and/or video communication. This has been done in good lilliam to provide continuity of care in the best interest of the provider-patient relationship, due to the ongoing public health crisis/national emergency and because of restrictions of visitation. There are limitations of this visit as no physical exam could be performed. Every conscious effort was taken to allow for sufficient and adequate time. This billing was spent on reviewing labs, medications, radiology tests and decision making. Appropriate medical decision-making and tests are ordered as detailed in the plan of care below     Patient last seen by me in December 2022  Patient has a history of chronic sinusitis with nasal polyps, persistent asthma allergic rhinitis nasal septal deviation      At last visit patient continued to have a wonderful response to the symptoms in regards to treatment of her nasal polyps  Patient was also able to come off of inhaled corticosteroids for her asthma since starting Dupixent. Medication list include Dupixent Zyrtec triamcinolone  COVID-vaccine x3 doses  1 dog in the home    Today patient reports    Asthma  Active or persistent symptoms denies   ED visits or prednisone in the interim: denies   Active meds:   Dupixent for underlying nasal polyps, albuterol as needed    Chronic sinusitis with nasal polyps  Overall much improved with Dupixent. Denies recurrent sinus infections antibiotics or steroids. Denies side effects with Dupixent  Recent sinusissues x 2+ weeks  Some + MP , no fevers     Allergic rhinitis  Stable with Zyrtec.     No prior pneumonia vaccine. Last COVID-vaccine was November 2021 still avoiding NSAIDs due to allergy            HISTORY:  Past Medical History:   Diagnosis Date    Chronic rhinitis     Extrinsic asthma, unspecified     Hypothyroid     Hypothyroidism     Nasal polyps       Past Surgical History:   Procedure Laterality Date    D & C  2003    Cervical Stump    OTHER SURGICAL HISTORY  5/2012    NASAL POLYP  [facility]    OTHER SURGICAL HISTORY  09/21/2021    uterine ablasion    OTHER SURGICAL HISTORY Right 10/27/2021    Lasik redo      Family History   Problem Relation Age of Onset    Cancer Mother         Brain    Cancer Maternal Grandmother         stomach cancer? Diabetes Maternal Grandmother     Diabetes Maternal Grandfather     Heart Disorder Maternal Grandfather     Cancer Maternal Grandfather         unknown cancer    Suicide History Father     No Known Problems Sister       Social History:   Social History     Socioeconomic History    Marital status:    Tobacco Use    Smoking status: Never    Smokeless tobacco: Never   Vaping Use    Vaping Use: Never used   Substance and Sexual Activity    Alcohol use:  Yes     Alcohol/week: 1.0 standard drink of alcohol     Types: 1 Glasses of wine per week     Comment: 1-2 drinks/wk    Drug use: No    Sexual activity: Yes     Partners: Male     Birth control/protection: OCP   Other Topics Concern    Caffeine Concern Yes     Comment: 1-2 cokes/day    Exercise Yes     Comment: 2xweek cardio    Seat Belt Yes        Medications (Active prior to today's visit):  Current Outpatient Medications   Medication Sig Dispense Refill    cetirizine 10 MG Oral Tab TAKE 1 TABLET BY MOUTH EVERY DAY 90 tablet 0    Phentermine HCl 37.5 MG Oral Tab Take 1 tablet (37.5 mg total) by mouth every morning before breakfast. 30 tablet 0    Dupilumab (DUPIXENT) 300 MG/2ML Subcutaneous Solution Pen-injector INJECT 1 PEN UNDER THE SKIN (SUBCUTANEOUS INJECTION) EVERY 2 WEEKS 4 each 2    levothyroxine 137 MCG Oral Tab Take 137 mcg by mouth before breakfast. 90 tablet 0    semaglutide 4 MG/3ML Subcutaneous Solution Pen-injector Inject 1 mg into the skin once a week. 9 mL 0    semaglutide (OZEMPIC, 2 MG/DOSE,) 8 MG/3ML Subcutaneous Solution Pen-injector Inject 2 mg into the skin once a week. 9 mL 1    semaglutide 8 MG/3ML Subcutaneous Solution Pen-injector Inject 2 mg into the skin once a week. 9 mL 0    Phentermine HCl 37.5 MG Oral Tab Take 1 tablet (37.5 mg total) by mouth before breakfast. 30 tablet 2    albuterol (PROAIR HFA) 108 (90 Base) MCG/ACT Inhalation Aero Soln Inhale 2 puffs into the lungs every 6 (six) hours as needed for Wheezing. 3 each 0    triamcinolone 0.1 % External Ointment       predniSONE 10 MG Oral Tab 4 tabs by mouth daily x4 days, 3 tabs by mouth daily x3 days, 2 tabs by mouth daily x2 days, 1 tab by mouth daily x1 day 30 tablet 0    Insulin Pen Needle (PEN NEEDLES) 32G X 4 MM Does not apply Misc Inject 1 each into the skin daily. 100 each 1    diazePAM (VALIUM) 2 MG Oral Tab Take 1 tablet (2 mg total) by mouth 3 (three) times daily as needed for Anxiety. 10 tablet 0    albuterol 108 (90 Base) MCG/ACT Inhalation Aero Soln Inhale 2 puffs into the lungs every 4 (four) hours as needed for Wheezing. 3 each 1    BUPROPION  MG Oral Tablet 12 Hr TAKE 1 TABLET(150 MG) BY MOUTH TWICE DAILY 180 tablet 0    Levonorgestrel-Ethinyl Estrad (AVIANE) 0.1-20 MG-MCG Oral Tab Take 1 tablet by mouth daily. 84 tablet 3    AVIANE 0.1-20 MG-MCG Oral Tab Take 1 tablet by mouth once daily 28 tablet 0    DUPIXENT 300 MG/2ML Subcutaneous Solution Prefilled Syringe INJECT 1 SYRINGE INTO THE SKIN EVERY 14 (FOURTEEN) DAYS. 4 mL 0    triamcinolone 0.1 % External Cream Apply topically 2 (two) times daily as needed. 60 g 3    topiramate 100 MG Oral Tab Take 1 tablet (100 mg total) by mouth 2 (two) times daily.  180 tablet 0    albuterol sulfate (2.5 MG/3ML) 0.083% Inhalation Nebu Soln Take 3 mL (2.5 mg total) by nebulization every 4 (four) hours as needed for Wheezing. 1 Box 1       Allergies:    Aspirin                 Tightness in Throat  Levaquin [Levofloxa*    RASH  Nsaids                    Penicillins                 Comment:As a Child      ROS:   Allergic/Immuno:  See hpi  Cardiovascular:  Negative for irregular heartbeat/palpitations, chest pain, edema  Constitutional:  Negative night sweats,weight loss, irritability and lethargy  ENMT:  Negative for ear drainage, hearing loss and nasal drainage  Eyes:  Negative for eye discharge and vision loss  Gastrointestinal:  Negative for abdominal pain, diarrhea and vomiting  Integumentary:  Negative for pruritus and rash  Respiratory:  Negative for cough, dyspnea and wheezing    PHYSICAL EXAM:   Constitutional: responsive, no acute distress noted  Head/Face: NC/Atraumatic  This visit is conducted using telemedicine with live interactive video and audio      ASSESSMENT/PLAN:   Assessment   Chronic ethmoidal sinusitis  (primary encounter diagnosis)  Nasal polyp  Moderate persistent extrinsic asthma without complication  Allergic rhinitis, unspecified seasonality, unspecified trigger  Covid-19 vaccine series completed  Flu vaccine need    #1 chronic sinusitis with nasal polyps  Overall much improved since starting Dupixent. Denies side effects. Recent sinus symptoms for 2 to 3 weeks afterward living with her sister temporarily who has pets. Patient is having colored drainage and mucopurulence over the past 2+ weeks  No fevers. Continue with current medications  Start doxycycline  Continue with Dupixent every 2 weeks. #2 asthma  No ED visits or prednisone in the interim. Currently on Dupixent for underlying chronic sinusitis which is also helping with her asthma.   Patient no longer needing inhaled corticosteroids  Albuterol every 4-6 hours as needed  Reviewed signs and symptoms of persistent asthma including the rules of 2    #3 allergic rhinitis  Continue with Zyrtec  May add Flonase or Nasacort 2 sprays per nostril once a day if having prominent nasal congestion or postnasal drip    #4 COVID vaccines reviewed. Recommend booster this fall. Last dose was in 2021    #5 pneumonia vaccine recommended with Prevnar 20 given history of asthma    #6 flu vaccine recommended this fall given history of asthma    Follow-up in 1 year or sooner if needed         Orders This Visit:  No orders of the defined types were placed in this encounter. Meds This Visit:  Requested Prescriptions      No prescriptions requested or ordered in this encounter       Imaging & Referrals:  None     10/7/2023  Nicole Parker MD    If medication samples were provided today, they were provided solely for patient education and training related to self administration of these medications. Teaching, instruction and sample was provided to the patient by myself. Teaching included  a review of potential adverse side effects as well as potential efficacy. Patient's questions were answered in regards to medication administration and dosing and potential side effects.  Teaching was provided via the teach back method

## 2023-10-07 NOTE — PATIENT INSTRUCTIONS
#1 chronic sinusitis with nasal polyps  Overall much improved since starting Dupixent. Denies side effects. Recent sinus symptoms for 2 to 3 weeks afterward living with her sister temporarily who has pets. Patient is having colored drainage and mucopurulence over the past 2+ weeks  No fevers. Continue with current medications  Start doxycycline  Continue with Dupixent every 2 weeks. #2 asthma  No ED visits or prednisone in the interim. Currently on Dupixent for underlying chronic sinusitis which is also helping with her asthma. Patient no longer needing inhaled corticosteroids  Albuterol every 4-6 hours as needed  Reviewed signs and symptoms of persistent asthma including the rules of 2    #3 allergic rhinitis  Continue with Zyrtec  May add Flonase or Nasacort 2 sprays per nostril once a day if having prominent nasal congestion or postnasal drip    #4 COVID vaccines reviewed. Recommend booster this fall.   Last dose was in 2021    #5 pneumonia vaccine recommended with Prevnar 20 given history of asthma    #6 flu vaccine recommended this fall given history of asthma    Follow-up in 1 year or sooner if needed

## 2023-10-09 ENCOUNTER — OFFICE VISIT (OUTPATIENT)
Dept: PHYSICAL THERAPY | Age: 48
End: 2023-10-09
Attending: FAMILY MEDICINE
Payer: COMMERCIAL

## 2023-10-09 DIAGNOSIS — G89.29 CHRONIC BILATERAL LOW BACK PAIN WITHOUT SCIATICA: Primary | ICD-10-CM

## 2023-10-09 DIAGNOSIS — M54.50 CHRONIC BILATERAL LOW BACK PAIN WITHOUT SCIATICA: Primary | ICD-10-CM

## 2023-10-09 PROCEDURE — 97161 PT EVAL LOW COMPLEX 20 MIN: CPT

## 2023-10-09 PROCEDURE — 97112 NEUROMUSCULAR REEDUCATION: CPT

## 2023-10-11 RX ORDER — ALBUTEROL SULFATE 90 UG/1
2 AEROSOL, METERED RESPIRATORY (INHALATION) EVERY 6 HOURS PRN
Qty: 3 EACH | Refills: 0 | Status: SHIPPED | OUTPATIENT
Start: 2023-10-11

## 2023-10-11 NOTE — TELEPHONE ENCOUNTER
Refill requested for   Requested Prescriptions   Pending Prescriptions Disp Refills    albuterol (PROAIR HFA) 108 (90 Base) MCG/ACT Inhalation Aero Soln 3 each 0     Sig: Inhale 2 puffs into the lungs every 6 (six) hours as needed for Wheezing.        Rescue Inhalers Failed - 10/10/2023  6:00 PM        Failed - Pass - Pending Nurse Triage Call        [id number] in past 6 mos or next 3 mos     Recent Outpatient Visits              2 days ago Chronic bilateral low back pain without sciatica    [facility], PT    Office Visit    4 days ago Chronic ethmoidal sinusitis    [facility], [facility], Alexandra Pedersen MD    Telemedicine    3 months ago Encounter for therapeutic drug monitoring    [facility], [address] [place] [address], Humberto Hickey, Raheem Garsia MD    Telemedicine    4 months ago Acute non-recurrent maxillary sinusitis    [facility], [place], John Macias, Delphine Canada,     Office Visit    6 months ago Encounter for therapeutic drug monitoring    [facility] [place], Rachael Mills MD    Telemedicine          Future Appointments         Provider Department Appt Notes    In 1 week Fermín Kimball MD [facility] [place] Jennifer Travis     In 1 week John Lagunas, PT [facility] 5 visits [address] [facility]  no c/p 60 visit limit    In 1 week PF MRI RM1 (1.5T)  [facility] [address]     In 2 weeks Marcristhian Pump, PT [facility] 5 visits [address] [facility]  no c/p 60 visit limit    In 3 weeks Marene Pump, PT [facility] 5 visits [address] [facility]  no c/p 60 visit limit    In 1 month Marene Pump, PT [facility] 5 visits [address] [facility]  no c/p 60 visit limit                      Passed - Last Refill > 30 days     Last rescue inhaler refill: 2022                          Last office visit: 10/7/23    Previously advised to follow up in 1 year or sooner if needed     F/U currently scheduled?  Not at this time      ACTION: RN called to patient   Verified patient's name and   Per patient inhaler is just about to    Patient usually uses inhaler once a week - receives  good relief after use  Refill filled per protocol

## 2023-10-18 ENCOUNTER — APPOINTMENT (OUTPATIENT)
Dept: PHYSICAL THERAPY | Age: 48
End: 2023-10-18
Attending: FAMILY MEDICINE
Payer: COMMERCIAL

## 2023-10-18 ENCOUNTER — LAB ENCOUNTER (OUTPATIENT)
Dept: LAB | Age: 48
End: 2023-10-18
Attending: INTERNAL MEDICINE
Payer: COMMERCIAL

## 2023-10-18 ENCOUNTER — OFFICE VISIT (OUTPATIENT)
Dept: INTERNAL MEDICINE CLINIC | Facility: CLINIC | Age: 48
End: 2023-10-18
Payer: COMMERCIAL

## 2023-10-18 VITALS
BODY MASS INDEX: 26.12 KG/M2 | RESPIRATION RATE: 16 BRPM | WEIGHT: 153 LBS | HEART RATE: 78 BPM | DIASTOLIC BLOOD PRESSURE: 78 MMHG | HEIGHT: 64 IN | SYSTOLIC BLOOD PRESSURE: 122 MMHG

## 2023-10-18 DIAGNOSIS — E03.9 HYPOTHYROIDISM, UNSPECIFIED TYPE: ICD-10-CM

## 2023-10-18 DIAGNOSIS — E66.3 OVERWEIGHT (BMI 25.0-29.9): ICD-10-CM

## 2023-10-18 DIAGNOSIS — R53.83 FATIGUE, UNSPECIFIED TYPE: ICD-10-CM

## 2023-10-18 DIAGNOSIS — Z51.81 ENCOUNTER FOR THERAPEUTIC DRUG MONITORING: ICD-10-CM

## 2023-10-18 DIAGNOSIS — Z51.81 ENCOUNTER FOR THERAPEUTIC DRUG MONITORING: Primary | ICD-10-CM

## 2023-10-18 LAB
ALBUMIN SERPL-MCNC: 3.6 G/DL (ref 3.4–5)
ALBUMIN/GLOB SERPL: 0.9 {RATIO} (ref 1–2)
ALP LIVER SERPL-CCNC: 56 U/L
ALT SERPL-CCNC: 17 U/L
ANION GAP SERPL CALC-SCNC: 7 MMOL/L (ref 0–18)
AST SERPL-CCNC: 5 U/L (ref 15–37)
BASOPHILS # BLD AUTO: 0.09 X10(3) UL (ref 0–0.2)
BASOPHILS NFR BLD AUTO: 1.3 %
BILIRUB SERPL-MCNC: 0.4 MG/DL (ref 0.1–2)
BUN BLD-MCNC: 10 MG/DL (ref 7–18)
CALCIUM BLD-MCNC: 9.3 MG/DL (ref 8.5–10.1)
CHLORIDE SERPL-SCNC: 107 MMOL/L (ref 98–112)
CO2 SERPL-SCNC: 26 MMOL/L (ref 21–32)
CREAT BLD-MCNC: 1.05 MG/DL
DEPRECATED HBV CORE AB SER IA-ACNC: 94.7 NG/ML
EGFRCR SERPLBLD CKD-EPI 2021: 66 ML/MIN/1.73M2 (ref 60–?)
EOSINOPHIL # BLD AUTO: 1.06 X10(3) UL (ref 0–0.7)
EOSINOPHIL NFR BLD AUTO: 15.4 %
ERYTHROCYTE [DISTWIDTH] IN BLOOD BY AUTOMATED COUNT: 13.1 %
FASTING STATUS PATIENT QL REPORTED: NO
FOLATE SERPL-MCNC: 4.4 NG/ML (ref 8.7–?)
GLOBULIN PLAS-MCNC: 4 G/DL (ref 2.8–4.4)
GLUCOSE BLD-MCNC: 85 MG/DL (ref 70–99)
HCT VFR BLD AUTO: 45.6 %
HGB BLD-MCNC: 14.6 G/DL
IMM GRANULOCYTES # BLD AUTO: 0.01 X10(3) UL (ref 0–1)
IMM GRANULOCYTES NFR BLD: 0.1 %
IRON SATN MFR SERPL: 17 %
IRON SERPL-MCNC: 72 UG/DL
LYMPHOCYTES # BLD AUTO: 1.77 X10(3) UL (ref 1–4)
LYMPHOCYTES NFR BLD AUTO: 25.7 %
MCH RBC QN AUTO: 30 PG (ref 26–34)
MCHC RBC AUTO-ENTMCNC: 32 G/DL (ref 31–37)
MCV RBC AUTO: 93.8 FL
MONOCYTES # BLD AUTO: 0.44 X10(3) UL (ref 0.1–1)
MONOCYTES NFR BLD AUTO: 6.4 %
NEUTROPHILS # BLD AUTO: 3.53 X10 (3) UL (ref 1.5–7.7)
NEUTROPHILS # BLD AUTO: 3.53 X10(3) UL (ref 1.5–7.7)
NEUTROPHILS NFR BLD AUTO: 51.1 %
OSMOLALITY SERPL CALC.SUM OF ELEC: 288 MOSM/KG (ref 275–295)
PLATELET # BLD AUTO: 238 10(3)UL (ref 150–450)
POTASSIUM SERPL-SCNC: 3.8 MMOL/L (ref 3.5–5.1)
PROT SERPL-MCNC: 7.6 G/DL (ref 6.4–8.2)
RBC # BLD AUTO: 4.86 X10(6)UL
SODIUM SERPL-SCNC: 140 MMOL/L (ref 136–145)
T4 FREE SERPL-MCNC: 0.9 NG/DL (ref 0.8–1.7)
TIBC SERPL-MCNC: 422 UG/DL (ref 240–450)
TRANSFERRIN SERPL-MCNC: 283 MG/DL (ref 200–360)
TSI SER-ACNC: 38.2 MIU/ML (ref 0.36–3.74)
VIT B12 SERPL-MCNC: 389 PG/ML (ref 193–986)
VIT D+METAB SERPL-MCNC: 33 NG/ML (ref 30–100)
WBC # BLD AUTO: 6.9 X10(3) UL (ref 4–11)

## 2023-10-18 PROCEDURE — 82607 VITAMIN B-12: CPT

## 2023-10-18 PROCEDURE — 83540 ASSAY OF IRON: CPT

## 2023-10-18 PROCEDURE — 82728 ASSAY OF FERRITIN: CPT

## 2023-10-18 PROCEDURE — 82306 VITAMIN D 25 HYDROXY: CPT

## 2023-10-18 PROCEDURE — 3078F DIAST BP <80 MM HG: CPT | Performed by: INTERNAL MEDICINE

## 2023-10-18 PROCEDURE — 99214 OFFICE O/P EST MOD 30 MIN: CPT | Performed by: INTERNAL MEDICINE

## 2023-10-18 PROCEDURE — 3008F BODY MASS INDEX DOCD: CPT | Performed by: INTERNAL MEDICINE

## 2023-10-18 PROCEDURE — 36415 COLL VENOUS BLD VENIPUNCTURE: CPT

## 2023-10-18 PROCEDURE — 85025 COMPLETE CBC W/AUTO DIFF WBC: CPT

## 2023-10-18 PROCEDURE — 82746 ASSAY OF FOLIC ACID SERUM: CPT

## 2023-10-18 PROCEDURE — 80053 COMPREHEN METABOLIC PANEL: CPT

## 2023-10-18 PROCEDURE — 84439 ASSAY OF FREE THYROXINE: CPT

## 2023-10-18 PROCEDURE — 83550 IRON BINDING TEST: CPT

## 2023-10-18 PROCEDURE — 84443 ASSAY THYROID STIM HORMONE: CPT

## 2023-10-18 PROCEDURE — 3074F SYST BP LT 130 MM HG: CPT | Performed by: INTERNAL MEDICINE

## 2023-10-18 RX ORDER — PHENTERMINE HYDROCHLORIDE 37.5 MG/1
37.5 TABLET ORAL
Qty: 30 TABLET | Refills: 2 | Status: SHIPPED | OUTPATIENT
Start: 2023-10-18

## 2023-10-18 RX ORDER — BUPROPION HYDROCHLORIDE 150 MG/1
TABLET, EXTENDED RELEASE ORAL
Qty: 180 TABLET | Refills: 0 | Status: SHIPPED | OUTPATIENT
Start: 2023-10-18

## 2023-10-18 RX ORDER — TOPIRAMATE 100 MG/1
100 TABLET, FILM COATED ORAL 2 TIMES DAILY
Qty: 180 TABLET | Refills: 0 | Status: SHIPPED | OUTPATIENT
Start: 2023-10-18

## 2023-10-19 DIAGNOSIS — E03.9 HYPOTHYROIDISM, UNSPECIFIED TYPE: Primary | ICD-10-CM

## 2023-10-19 RX ORDER — LEVOTHYROXINE SODIUM 0.15 MG/1
150 TABLET ORAL
Qty: 90 TABLET | Refills: 0 | Status: SHIPPED | OUTPATIENT
Start: 2023-10-19

## 2023-10-24 ENCOUNTER — PATIENT MESSAGE (OUTPATIENT)
Dept: INTERNAL MEDICINE CLINIC | Facility: CLINIC | Age: 48
End: 2023-10-24

## 2023-10-24 RX ORDER — CYANOCOBALAMIN 500 UG/1
1 SPRAY NASAL WEEKLY
Qty: 4 EACH | Refills: 5 | Status: SHIPPED | OUTPATIENT
Start: 2023-10-24

## 2023-10-24 NOTE — TELEPHONE ENCOUNTER
From: Hien Garcia  To: Chance Leahy  Sent: 10/24/2023 10:49 AM CDT  Subject: B12    Hi I will try the nasal spray. Is that as effective as the shots?      Thanks   Hien Garcia

## 2023-10-30 ENCOUNTER — APPOINTMENT (OUTPATIENT)
Dept: PHYSICAL THERAPY | Age: 48
End: 2023-10-30
Attending: FAMILY MEDICINE
Payer: COMMERCIAL

## 2023-11-06 ENCOUNTER — OFFICE VISIT (OUTPATIENT)
Dept: FAMILY MEDICINE CLINIC | Facility: CLINIC | Age: 48
End: 2023-11-06
Payer: COMMERCIAL

## 2023-11-06 ENCOUNTER — APPOINTMENT (OUTPATIENT)
Dept: PHYSICAL THERAPY | Age: 48
End: 2023-11-06
Attending: FAMILY MEDICINE
Payer: COMMERCIAL

## 2023-11-06 VITALS
HEIGHT: 64 IN | SYSTOLIC BLOOD PRESSURE: 100 MMHG | DIASTOLIC BLOOD PRESSURE: 60 MMHG | RESPIRATION RATE: 18 BRPM | HEART RATE: 97 BPM | BODY MASS INDEX: 24.96 KG/M2 | OXYGEN SATURATION: 97 % | WEIGHT: 146.19 LBS

## 2023-11-06 DIAGNOSIS — M54.50 CHRONIC BILATERAL LOW BACK PAIN WITHOUT SCIATICA: ICD-10-CM

## 2023-11-06 DIAGNOSIS — L57.0 ACTINIC KERATOSIS: ICD-10-CM

## 2023-11-06 DIAGNOSIS — J45.40 MODERATE PERSISTENT ASTHMA WITHOUT COMPLICATION: ICD-10-CM

## 2023-11-06 DIAGNOSIS — R07.9 INTERMITTENT CHEST PAIN: ICD-10-CM

## 2023-11-06 DIAGNOSIS — G89.29 CHRONIC BILATERAL LOW BACK PAIN WITHOUT SCIATICA: ICD-10-CM

## 2023-11-06 DIAGNOSIS — Z12.11 COLON CANCER SCREENING: ICD-10-CM

## 2023-11-06 DIAGNOSIS — Z12.31 ENCOUNTER FOR SCREENING MAMMOGRAM FOR MALIGNANT NEOPLASM OF BREAST: ICD-10-CM

## 2023-11-06 DIAGNOSIS — R06.09 DYSPNEA ON EXERTION: ICD-10-CM

## 2023-11-06 DIAGNOSIS — E03.9 ACQUIRED HYPOTHYROIDISM: Primary | ICD-10-CM

## 2023-11-06 PROCEDURE — 99215 OFFICE O/P EST HI 40 MIN: CPT | Performed by: FAMILY MEDICINE

## 2023-11-06 PROCEDURE — 90677 PCV20 VACCINE IM: CPT | Performed by: FAMILY MEDICINE

## 2023-11-06 PROCEDURE — 3074F SYST BP LT 130 MM HG: CPT | Performed by: FAMILY MEDICINE

## 2023-11-06 PROCEDURE — 3078F DIAST BP <80 MM HG: CPT | Performed by: FAMILY MEDICINE

## 2023-11-06 PROCEDURE — 90471 IMMUNIZATION ADMIN: CPT | Performed by: FAMILY MEDICINE

## 2023-11-06 PROCEDURE — 3008F BODY MASS INDEX DOCD: CPT | Performed by: FAMILY MEDICINE

## 2023-11-06 RX ORDER — TRETINOIN 0.5 MG/G
CREAM TOPICAL
Qty: 45 G | Refills: 2 | Status: SHIPPED | OUTPATIENT
Start: 2023-11-06

## 2023-11-06 RX ORDER — FLUTICASONE FUROATE, UMECLIDINIUM BROMIDE AND VILANTEROL TRIFENATATE 200; 62.5; 25 UG/1; UG/1; UG/1
1 POWDER RESPIRATORY (INHALATION) DAILY
Qty: 3 EACH | Refills: 1 | Status: SHIPPED | OUTPATIENT
Start: 2023-11-06

## 2023-11-07 ENCOUNTER — NURSE ONLY (OUTPATIENT)
Dept: INTERNAL MEDICINE CLINIC | Facility: CLINIC | Age: 48
End: 2023-11-07
Payer: COMMERCIAL

## 2023-11-07 DIAGNOSIS — E53.8 VITAMIN B12 DEFICIENCY: Primary | ICD-10-CM

## 2023-11-07 PROCEDURE — 96372 THER/PROPH/DIAG INJ SC/IM: CPT | Performed by: NURSE PRACTITIONER

## 2023-11-07 PROCEDURE — 87624 HPV HI-RISK TYP POOLED RSLT: CPT | Performed by: OBSTETRICS & GYNECOLOGY

## 2023-11-07 PROCEDURE — 88175 CYTOPATH C/V AUTO FLUID REDO: CPT | Performed by: OBSTETRICS & GYNECOLOGY

## 2023-11-07 RX ADMIN — CYANOCOBALAMIN 1000 MCG: 1000 INJECTION, SOLUTION INTRAMUSCULAR; SUBCUTANEOUS at 13:31:00

## 2023-11-08 RX ORDER — CYANOCOBALAMIN 1000 UG/ML
1000 INJECTION, SOLUTION INTRAMUSCULAR; SUBCUTANEOUS ONCE
Status: COMPLETED | OUTPATIENT
Start: 2023-11-07 | End: 2023-11-07

## 2023-11-13 ENCOUNTER — APPOINTMENT (OUTPATIENT)
Dept: PHYSICAL THERAPY | Age: 48
End: 2023-11-13
Attending: FAMILY MEDICINE
Payer: COMMERCIAL

## 2023-11-13 ENCOUNTER — HOSPITAL ENCOUNTER (OUTPATIENT)
Dept: MRI IMAGING | Age: 48
Discharge: HOME | End: 2023-11-13
Attending: NURSE PRACTITIONER
Payer: COMMERCIAL

## 2023-11-13 DIAGNOSIS — D32.9 MENINGIOMA (HCC): ICD-10-CM

## 2023-11-13 PROCEDURE — A9575 INJ GADOTERATE MEGLUMI 0.1ML: HCPCS

## 2023-11-13 PROCEDURE — 70553 MRI BRAIN STEM W/O & W/DYE: CPT | Performed by: NURSE PRACTITIONER

## 2023-11-13 RX ORDER — GADOTERATE MEGLUMINE 376.9 MG/ML
15 INJECTION INTRAVENOUS
Status: COMPLETED | OUTPATIENT
Start: 2023-11-13 | End: 2023-11-13

## 2023-11-13 RX ADMIN — GADOTERATE MEGLUMINE 22 ML: 376.9 INJECTION INTRAVENOUS at 20:13:00

## 2023-11-16 RX ORDER — DUPILUMAB 300 MG/2ML
1 INJECTION, SOLUTION SUBCUTANEOUS
Qty: 4 EACH | Refills: 2 | Status: SHIPPED | OUTPATIENT
Start: 2023-11-16

## 2023-11-21 ENCOUNTER — TELEPHONE (OUTPATIENT)
Dept: PHYSICAL THERAPY | Facility: HOSPITAL | Age: 48
End: 2023-11-21

## 2023-11-28 ENCOUNTER — PATIENT MESSAGE (OUTPATIENT)
Dept: INTERNAL MEDICINE CLINIC | Facility: CLINIC | Age: 48
End: 2023-11-28

## 2023-11-28 ENCOUNTER — TELEPHONE (OUTPATIENT)
Dept: PHYSICAL THERAPY | Facility: HOSPITAL | Age: 48
End: 2023-11-28

## 2023-12-05 RX ORDER — ALBUTEROL SULFATE 90 UG/1
2 AEROSOL, METERED RESPIRATORY (INHALATION) EVERY 6 HOURS PRN
Qty: 25.5 EACH | Refills: 0 | Status: SHIPPED | OUTPATIENT
Start: 2023-12-05

## 2023-12-05 NOTE — TELEPHONE ENCOUNTER
Spoke with patient. Verified name and . Informed patient  our office received a refill of Albuterol inhaler and per protocol will need to assess patient. Patient states she is doing well and just wished to refill said medication to have on hand. Informed patient will refill Albuterol inhaler as last office visit was 10/7/23. Patient verbalizes understanding.

## 2023-12-06 ENCOUNTER — APPOINTMENT (OUTPATIENT)
Dept: PHYSICAL THERAPY | Age: 48
End: 2023-12-06
Attending: FAMILY MEDICINE
Payer: COMMERCIAL

## 2023-12-07 ENCOUNTER — TELEPHONE (OUTPATIENT)
Dept: PHYSICAL THERAPY | Facility: HOSPITAL | Age: 48
End: 2023-12-07

## 2023-12-07 ENCOUNTER — OFFICE VISIT (OUTPATIENT)
Dept: FAMILY MEDICINE CLINIC | Facility: CLINIC | Age: 48
End: 2023-12-07
Payer: COMMERCIAL

## 2023-12-07 ENCOUNTER — NURSE ONLY (OUTPATIENT)
Dept: INTERNAL MEDICINE CLINIC | Facility: CLINIC | Age: 48
End: 2023-12-07
Payer: COMMERCIAL

## 2023-12-07 VITALS
WEIGHT: 142 LBS | HEART RATE: 96 BPM | BODY MASS INDEX: 24.24 KG/M2 | TEMPERATURE: 98 F | SYSTOLIC BLOOD PRESSURE: 104 MMHG | RESPIRATION RATE: 14 BRPM | OXYGEN SATURATION: 98 % | DIASTOLIC BLOOD PRESSURE: 66 MMHG | HEIGHT: 64 IN

## 2023-12-07 DIAGNOSIS — J01.10 ACUTE NON-RECURRENT FRONTAL SINUSITIS: Primary | ICD-10-CM

## 2023-12-07 DIAGNOSIS — E53.8 VITAMIN B12 DEFICIENCY: Primary | ICD-10-CM

## 2023-12-07 PROCEDURE — 99214 OFFICE O/P EST MOD 30 MIN: CPT | Performed by: FAMILY MEDICINE

## 2023-12-07 PROCEDURE — 3078F DIAST BP <80 MM HG: CPT | Performed by: FAMILY MEDICINE

## 2023-12-07 PROCEDURE — 3008F BODY MASS INDEX DOCD: CPT | Performed by: FAMILY MEDICINE

## 2023-12-07 PROCEDURE — 96372 THER/PROPH/DIAG INJ SC/IM: CPT | Performed by: NURSE PRACTITIONER

## 2023-12-07 PROCEDURE — 3074F SYST BP LT 130 MM HG: CPT | Performed by: FAMILY MEDICINE

## 2023-12-07 RX ORDER — DOXYCYCLINE HYCLATE 100 MG
100 TABLET ORAL 2 TIMES DAILY
Qty: 20 TABLET | Refills: 0 | Status: SHIPPED | OUTPATIENT
Start: 2023-12-07 | End: 2023-12-17

## 2023-12-07 RX ORDER — PREDNISONE 20 MG/1
TABLET ORAL
Qty: 20 TABLET | Refills: 0 | Status: SHIPPED | OUTPATIENT
Start: 2023-12-07

## 2023-12-07 RX ORDER — CYANOCOBALAMIN 1000 UG/ML
1000 INJECTION, SOLUTION INTRAMUSCULAR; SUBCUTANEOUS ONCE
Status: COMPLETED | OUTPATIENT
Start: 2023-12-07 | End: 2023-12-07

## 2023-12-07 RX ADMIN — CYANOCOBALAMIN 1000 MCG: 1000 INJECTION, SOLUTION INTRAMUSCULAR; SUBCUTANEOUS at 17:00:00

## 2023-12-11 ENCOUNTER — APPOINTMENT (OUTPATIENT)
Dept: PHYSICAL THERAPY | Age: 48
End: 2023-12-11
Attending: FAMILY MEDICINE
Payer: COMMERCIAL

## 2023-12-13 ENCOUNTER — APPOINTMENT (OUTPATIENT)
Dept: CT IMAGING | Facility: HOSPITAL | Age: 48
End: 2023-12-13
Attending: EMERGENCY MEDICINE
Payer: COMMERCIAL

## 2023-12-13 ENCOUNTER — HOSPITAL ENCOUNTER (EMERGENCY)
Facility: HOSPITAL | Age: 48
Discharge: HOME | End: 2023-12-13
Attending: EMERGENCY MEDICINE
Payer: COMMERCIAL

## 2023-12-13 ENCOUNTER — APPOINTMENT (OUTPATIENT)
Dept: PHYSICAL THERAPY | Age: 48
End: 2023-12-13
Payer: COMMERCIAL

## 2023-12-13 VITALS
SYSTOLIC BLOOD PRESSURE: 115 MMHG | TEMPERATURE: 98 F | WEIGHT: 145 LBS | RESPIRATION RATE: 18 BRPM | OXYGEN SATURATION: 98 % | DIASTOLIC BLOOD PRESSURE: 81 MMHG | HEIGHT: 63 IN | BODY MASS INDEX: 25.69 KG/M2 | HEART RATE: 69 BPM

## 2023-12-13 DIAGNOSIS — R51.9 SINUS HEADACHE: Primary | ICD-10-CM

## 2023-12-13 PROCEDURE — 96375 TX/PRO/DX INJ NEW DRUG ADDON: CPT

## 2023-12-13 PROCEDURE — 96374 THER/PROPH/DIAG INJ IV PUSH: CPT

## 2023-12-13 PROCEDURE — 70450 CT HEAD/BRAIN W/O DYE: CPT | Performed by: EMERGENCY MEDICINE

## 2023-12-13 PROCEDURE — 99285 EMERGENCY DEPT VISIT HI MDM: CPT

## 2023-12-13 PROCEDURE — 99284 EMERGENCY DEPT VISIT MOD MDM: CPT

## 2023-12-13 RX ORDER — HYDROMORPHONE HYDROCHLORIDE 1 MG/ML
0.5 INJECTION, SOLUTION INTRAMUSCULAR; INTRAVENOUS; SUBCUTANEOUS EVERY 30 MIN PRN
Status: DISCONTINUED | OUTPATIENT
Start: 2023-12-13 | End: 2023-12-13

## 2023-12-13 RX ORDER — ONDANSETRON 4 MG/1
4 TABLET, ORALLY DISINTEGRATING ORAL EVERY 4 HOURS PRN
Qty: 10 TABLET | Refills: 0 | Status: SHIPPED | OUTPATIENT
Start: 2023-12-13 | End: 2023-12-20

## 2023-12-13 RX ORDER — TRAMADOL HYDROCHLORIDE 50 MG/1
TABLET ORAL EVERY 4 HOURS PRN
Qty: 20 TABLET | Refills: 0 | Status: SHIPPED | OUTPATIENT
Start: 2023-12-13 | End: 2023-12-18

## 2023-12-13 RX ORDER — ONDANSETRON 2 MG/ML
4 INJECTION INTRAMUSCULAR; INTRAVENOUS ONCE
Status: COMPLETED | OUTPATIENT
Start: 2023-12-13 | End: 2023-12-13

## 2023-12-14 ENCOUNTER — PATIENT OUTREACH (OUTPATIENT)
Dept: CASE MANAGEMENT | Age: 48
End: 2023-12-14

## 2023-12-18 ENCOUNTER — APPOINTMENT (OUTPATIENT)
Dept: PHYSICAL THERAPY | Age: 48
End: 2023-12-18
Payer: COMMERCIAL

## 2023-12-20 ENCOUNTER — APPOINTMENT (OUTPATIENT)
Dept: PHYSICAL THERAPY | Age: 48
End: 2023-12-20
Payer: COMMERCIAL

## 2023-12-31 DIAGNOSIS — E03.9 HYPOTHYROIDISM, UNSPECIFIED TYPE: ICD-10-CM

## 2024-01-02 RX ORDER — TOPIRAMATE 100 MG/1
100 TABLET, FILM COATED ORAL 2 TIMES DAILY
Qty: 180 TABLET | Refills: 0 | Status: SHIPPED | OUTPATIENT
Start: 2024-01-02

## 2024-01-02 RX ORDER — BUPROPION HYDROCHLORIDE 150 MG/1
TABLET, EXTENDED RELEASE ORAL
Qty: 180 TABLET | Refills: 0 | Status: SHIPPED | OUTPATIENT
Start: 2024-01-02

## 2024-01-02 RX ORDER — LEVOTHYROXINE SODIUM 0.15 MG/1
150 TABLET ORAL
Qty: 90 TABLET | Refills: 0 | Status: SHIPPED | OUTPATIENT
Start: 2024-01-02

## 2024-01-02 NOTE — TELEPHONE ENCOUNTER
Last office visit: 11/6/2023   Protocol: pass  Requested medication(s) are due for refill today: yes  Requested medication(s) are on the active medication list same strength, form, dose/ sig: yes  Requested medication(s) are managed by provider: yes  Patient has already received a courtsey refill: no

## 2024-01-02 NOTE — TELEPHONE ENCOUNTER
Requesting   Requested Prescriptions     Pending Prescriptions Disp Refills    BUPROPION  MG Oral Tablet 12 Hr [Pharmacy Med Name: BUPROPION HCL  MG TABLET] 180 tablet 0     Sig: TAKE 1 TABLET(150 MG) BY MOUTH TWICE DAILY    TOPIRAMATE 100 MG Oral Tab [Pharmacy Med Name: TOPIRAMATE 100 MG TABLET] 180 tablet 0     Sig: TAKE 1 TABLET BY MOUTH TWICE A DAY     LOV: 10/18/23  RTC: not noted  Filled: bupropion 10/18/23  #180 with 0 refills  Topiramate 10/18/23 #180 with 0 refills    Future Appointments   Date Time Provider Department Center   1/8/2024  7:00 PM [facility] RM3  [facility]   1/12/2024  1:00 PM [facility] NURSE [facility]   1/22/2024 11:20 AM Analilia Preston MD [facility]

## 2024-01-08 ENCOUNTER — HOSPITAL ENCOUNTER (OUTPATIENT)
Dept: MAMMOGRAPHY | Facility: HOSPITAL | Age: 49
Discharge: HOME | End: 2024-01-08
Attending: FAMILY MEDICINE
Payer: COMMERCIAL

## 2024-01-08 DIAGNOSIS — Z12.31 ENCOUNTER FOR SCREENING MAMMOGRAM FOR MALIGNANT NEOPLASM OF BREAST: ICD-10-CM

## 2024-01-08 PROCEDURE — 77063 BREAST TOMOSYNTHESIS BI: CPT | Performed by: FAMILY MEDICINE

## 2024-01-08 PROCEDURE — 77067 SCR MAMMO BI INCL CAD: CPT | Performed by: FAMILY MEDICINE

## 2024-01-15 ENCOUNTER — NURSE ONLY (OUTPATIENT)
Dept: INTERNAL MEDICINE CLINIC | Facility: CLINIC | Age: 49
End: 2024-01-15
Payer: COMMERCIAL

## 2024-01-15 DIAGNOSIS — E53.8 B12 DEFICIENCY: Primary | ICD-10-CM

## 2024-01-15 PROCEDURE — 96372 THER/PROPH/DIAG INJ SC/IM: CPT | Performed by: NURSE PRACTITIONER

## 2024-01-15 RX ADMIN — CYANOCOBALAMIN 1000 MCG: 1000 INJECTION, SOLUTION INTRAMUSCULAR; SUBCUTANEOUS at 10:35:00

## 2024-01-16 RX ORDER — CYANOCOBALAMIN 1000 UG/ML
1000 INJECTION, SOLUTION INTRAMUSCULAR; SUBCUTANEOUS ONCE
Status: COMPLETED | OUTPATIENT
Start: 2024-01-16 | End: 2024-01-15

## 2024-01-22 ENCOUNTER — TELEMEDICINE (OUTPATIENT)
Dept: INTERNAL MEDICINE CLINIC | Facility: CLINIC | Age: 49
End: 2024-01-22
Payer: COMMERCIAL

## 2024-01-22 DIAGNOSIS — E66.3 OVERWEIGHT (BMI 25.0-29.9): ICD-10-CM

## 2024-01-22 DIAGNOSIS — Z51.81 ENCOUNTER FOR THERAPEUTIC DRUG MONITORING: Primary | ICD-10-CM

## 2024-01-22 RX ORDER — PHENTERMINE HYDROCHLORIDE 37.5 MG/1
37.5 TABLET ORAL
Qty: 30 TABLET | Refills: 2 | Status: SHIPPED | OUTPATIENT
Start: 2024-01-22

## 2024-01-22 NOTE — PROGRESS NOTES
HISTORY OF PRESENT ILLNESS  Chief Complaint   Patient presents with    Weight Check     Video            Jamila Vivar is a 48 year old female here for follow up in medical weight loss program.     Here for follow up   Was down to 140 at her lowest then up to 146 lb   Was struggling with snacking   Does  still work on the weekends that keeps her active   Did feel that she was struggle around of holiday   Did 3 months in Dec   No weight loss drug coverage for injectables last year         Wt Readings from Last 6 Encounters:   12/13/23 145 lb (65.8 kg)   12/07/23 142 lb (64.4 kg)   11/07/23 148 lb (67.1 kg)   11/06/23 146 lb 3.2 oz (66.3 kg)   10/18/23 153 lb (69.4 kg)   05/15/23 160 lb (72.6 kg)            REVIEW OF SYSTEMS  GENERAL HEALTH: feels well otherwise, denied any fevers chills or night sweats   RESPIRATORY: denies shortness of breath   CARDIOVASCULAR: denies chest pain  GI: denies abdominal pain    EXAM  LMP 12/13/2023 (Exact Date)   GENERAL: well developed, well nourished,in no apparent distress, A/O x3  SKIN: no rashes,no suspicious lesions  HEENT: atraumatic, normocephalic, OP-clear, PERRL  NECK: supple,no adenopathy  LUNGS: clear to auscultation bilaterally   CARDIO: RRR without murmur  GI: good BS's,NT/ND, no masses or HSM  EXTREMITIES: no cyanosis, no clubbing, no edema    Lab Results   Component Value Date    WBC 6.9 10/18/2023    RBC 4.86 10/18/2023    HGB 14.6 10/18/2023    HCT 45.6 10/18/2023    MCV 93.8 10/18/2023    MCH 30.0 10/18/2023    MCHC 32.0 10/18/2023    RDW 13.1 10/18/2023    .0 10/18/2023    MPV 11.1 10/20/2012     Lab Results   Component Value Date    GLU 85 10/18/2023    BUN 10 10/18/2023    BUNCREA 10.8 12/01/2022    CREATSERUM 1.05 (H) 10/18/2023    ANIONGAP 7 10/18/2023    GFR 74 01/27/2018    GFRNAA 97 09/11/2021    GFRAA 112 09/11/2021    CA 9.3 10/18/2023    OSMOCALC 288 10/18/2023    ALKPHO 56 10/18/2023    AST 5 (L) 10/18/2023    ALT 17 10/18/2023    BILT 0.4 10/18/2023     TP 7.6 10/18/2023    ALB 3.6 10/18/2023    GLOBULIN 4.0 10/18/2023    AGRATIO 1.7 01/17/2015     10/18/2023    K 3.8 10/18/2023     10/18/2023    CO2 26.0 10/18/2023     Lab Results   Component Value Date    EAG 85 08/31/2020    A1C 4.6 08/31/2020     Lab Results   Component Value Date    CHOLEST 167 11/10/2022    TRIG 67 11/10/2022    HDL 48 11/10/2022     (H) 11/10/2022    VLDL 11 11/10/2022    TCHDLRATIO 2.74 01/27/2018    NONHDLC 119 11/10/2022     Lab Results   Component Value Date    T4F 0.9 10/18/2023    TSH 38.200 (H) 10/18/2023    TSHT4 0.04 (L) 10/21/2015     Lab Results   Component Value Date    B12 389 10/18/2023     Lab Results   Component Value Date    VITD 33.0 10/18/2023       Current Outpatient Medications on File Prior to Visit   Medication Sig Dispense Refill    buPROPion  MG Oral Tablet 12 Hr TAKE 1 TABLET(150 MG) BY MOUTH TWICE DAILY 180 tablet 0    LEVOTHYROXINE 150 MCG Oral Tab TAKE 1 TABLET BY MOUTH BEFORE BREAKFAST. 90 tablet 0    topiramate 100 MG Oral Tab Take 1 tablet (100 mg total) by mouth 2 (two) times daily. 180 tablet 0    predniSONE 20 MG Oral Tab 3 tabs PO daily x 3d, 2 tabs PO daily x 3d, 1 tab PO daily x 3d, 1/2 tab PO daily x 3d 20 tablet 0    ALBUTEROL 108 (90 Base) MCG/ACT Inhalation Aero Soln INHALE 2 PUFFS INTO THE LUNGS EVERY 6 HOURS AS NEEDED FOR WHEEZE 25.5 each 0    semaglutide 4 MG/3ML Subcutaneous Solution Pen-injector Inject 1 mg into the skin once a week. 9 mL 0    Dupilumab (DUPIXENT) 300 MG/2ML Subcutaneous Solution Pen-injector Inject 1 Pen into the skin every 14 (fourteen) days. ICD 10: J45.40 4 each 2    AVIANE 0.1-20 MG-MCG Oral Tab Take 1 tablet by mouth once daily 28 tablet 0    Levonorgestrel-Ethinyl Estrad (AVIANE) 0.1-20 MG-MCG Oral Tab Take 1 tablet by mouth daily. 84 tablet 3    fluticasone-umeclidin-vilant (TRELEGY ELLIPTA) 200-62.5-25 MCG/ACT Inhalation Aerosol Powder, Breath Activated Inhale 1 puff into the lungs daily. 3  each 1    Tretinoin 0.05 % External Cream Apply topically to affected area at bedtime PRN 45 g 2    [DISCONTINUED] semaglutide 4 MG/3ML Subcutaneous Solution Pen-injector Inject 1 mg into the skin once a week. 3 mL 0    [DISCONTINUED] semaglutide 8 MG/3ML Subcutaneous Solution Pen-injector Inject 2 mg into the skin once a week. 9 mL 0    cetirizine 10 MG Oral Tab TAKE 1 TABLET BY MOUTH EVERY DAY 90 tablet 0    Insulin Pen Needle (PEN NEEDLES) 32G X 4 MM Does not apply Misc Inject 1 each into the skin daily. 100 each 1    albuterol 108 (90 Base) MCG/ACT Inhalation Aero Soln Inhale 2 puffs into the lungs every 4 (four) hours as needed for Wheezing. 3 each 1    DUPIXENT 300 MG/2ML Subcutaneous Solution Prefilled Syringe INJECT 1 SYRINGE INTO THE SKIN EVERY 14 (FOURTEEN) DAYS. 4 mL 0    triamcinolone 0.1 % External Cream Apply topically 2 (two) times daily as needed. 60 g 3    albuterol sulfate (2.5 MG/3ML) 0.083% Inhalation Nebu Soln Take 3 mL (2.5 mg total) by nebulization every 4 (four) hours as needed for Wheezing. 1 Box 1     No current facility-administered medications on file prior to visit.       ASSESSMENT  Analyzed weight data:       Diagnoses and all orders for this visit:    Encounter for therapeutic drug monitoring  -     Phentermine HCl 37.5 MG Oral Tab; Take 1 tablet (37.5 mg total) by mouth before breakfast.    Overweight (BMI 25.0-29.9)  -     Phentermine HCl 37.5 MG Oral Tab; Take 1 tablet (37.5 mg total) by mouth before breakfast.    Other orders  -     semaglutide 4 MG/3ML Subcutaneous Solution Pen-injector; Inject 1 mg into the skin once a week.            PLAN  Initial consult: 8/2/16 with [facility]   Weight max 4/3/19 on 179 lb    At goal weight   Hold wegovy due to national shortage  Add strength training  Continue ozempic 1 mg q weekly, discussed wean if no longer covered.     -advised of side effects and adverse effects of this medication  Continue phentermine 37.5 mg q day   -advised of side effects  and adverse effects of this medication  Tried saxenda in the past   Continue topamax and wellbutrin xl 150   Needs to track nutrition, increase vegetables / protein especially     We reviewed limitations of medication management without lifestyle adjustment   Nutrition: low carb diet/ recommended to eat breakfast daily/ regular protein intake  Medication use and side effects reviewed with patient.  Medication contraindications:phendimetrazine   Follow up with dietitian and psychologist as recommended.  Discussed the role of sleep and stress in weight management.  Counseled on comprehensive weight loss plan including attention to nutrition, exercise and behavior/stress management for success. See patient instruction below for more details.  Discussed strategies to overcome barriers to successful weight loss and weight maintenance  FITTE: ACSM recommendations (150-300 minutes/ week in active weight loss)    There are no Patient Instructions on file for this visit.    No follow-ups on file.    Patient verbalizes understanding.    Analilia Preston MD

## 2024-01-23 DIAGNOSIS — L57.0 ACTINIC KERATOSIS: ICD-10-CM

## 2024-01-23 NOTE — TELEPHONE ENCOUNTER
LOV:11-3-22    Last Refill:  Medication Quantity Refills Start End   Tretinoin 0.05 % External Cream 45 g 2 11/6/2023          RTC:6 months       MCM sent to pt to make an physical appt         Please sign if appropriate

## 2024-01-24 RX ORDER — TRETINOIN 0.5 MG/G
CREAM TOPICAL
Qty: 45 G | Refills: 1 | Status: SHIPPED | OUTPATIENT
Start: 2024-01-24

## 2024-01-24 NOTE — TELEPHONE ENCOUNTER
Future Appointments   Date Time Provider Department Center   2/12/2024  1:00 PM [facility] NURSE [facility]   3/11/2024  4:30 PM Ralph Macias,  [id number]   4/23/2024 10:40 AM Analilia Preston MD [facility]

## 2024-02-12 ENCOUNTER — NURSE ONLY (OUTPATIENT)
Dept: INTERNAL MEDICINE CLINIC | Facility: CLINIC | Age: 49
End: 2024-02-12
Payer: COMMERCIAL

## 2024-02-12 DIAGNOSIS — E53.8 B12 DEFICIENCY: Primary | ICD-10-CM

## 2024-02-12 RX ORDER — CYANOCOBALAMIN 1000 UG/ML
1000 INJECTION, SOLUTION INTRAMUSCULAR; SUBCUTANEOUS ONCE
Status: COMPLETED | OUTPATIENT
Start: 2024-02-12 | End: 2024-02-12

## 2024-02-12 RX ADMIN — CYANOCOBALAMIN 1000 MCG: 1000 INJECTION, SOLUTION INTRAMUSCULAR; SUBCUTANEOUS at 18:10:00

## 2024-02-14 DIAGNOSIS — J30.2 SEASONAL ALLERGIES: ICD-10-CM

## 2024-02-14 DIAGNOSIS — E03.9 HYPOTHYROIDISM, UNSPECIFIED TYPE: ICD-10-CM

## 2024-02-14 RX ORDER — LEVOTHYROXINE SODIUM 0.15 MG/1
150 TABLET ORAL
Qty: 90 TABLET | Refills: 0 | OUTPATIENT
Start: 2024-02-14

## 2024-02-14 RX ORDER — CETIRIZINE HYDROCHLORIDE 10 MG/1
TABLET ORAL
Qty: 90 TABLET | Refills: 0 | Status: SHIPPED | OUTPATIENT
Start: 2024-02-14

## 2024-02-23 ENCOUNTER — TELEPHONE (OUTPATIENT)
Dept: INTERNAL MEDICINE CLINIC | Facility: CLINIC | Age: 49
End: 2024-02-23

## 2024-02-23 NOTE — TELEPHONE ENCOUNTER
PA needed for Ozempic and patient is not diabetic  Will try to enter in epic as continuation of care  Awaiting questions.

## 2024-03-04 RX ORDER — ALBUTEROL SULFATE 90 UG/1
2 AEROSOL, METERED RESPIRATORY (INHALATION) EVERY 6 HOURS PRN
Qty: 25.5 EACH | Refills: 0 | OUTPATIENT
Start: 2024-03-04

## 2024-03-04 NOTE — TELEPHONE ENCOUNTER
LMTCB on dloHaitiil.   Ticies message sent to patient as below.      Ignacio Marino,        The [facility] received an electronic refill request for Albuterol Inhaler.  Due to a policy change, we are unable to refill the Albuterol Medication unless a patient is spoken to over the telephone.      A nurse must triage any current or lack of asthma symptoms you may be experiencing prior to refilling the medication.         Please call the Allergy Office at [phone] in order to speak with an Allergy RN.            Thank you,        Angie GRIFFIN RN                    Patient last seen in Allergy 10/7/2023 for . . .    Chronic ethmoidal sinusitis  (primary encounter diagnosis)  Nasal polyp  Moderate persistent extrinsic asthma without complication  Allergic rhinitis, unspecified seasonality, unspecified trigger  Covid-19 vaccine series completed  Flu vaccine need    Requested Prescriptions   Pending Prescriptions Disp Refills    ALBUTEROL 108 (90 Base) MCG/ACT Inhalation Aero Soln [Pharmacy Med Name: ALBUTEROL HFA (PROAIR) INHALER] 25.5 each 0     Sig: INHALE 2 PUFFS INTO THE LUNGS EVERY 6 HOURS AS NEEDED FOR WHEEZE       Rescue Inhalers Failed - 3/3/2024  6:59 AM        Failed - Pass - Pending Nurse Triage Call        Passed - Appt in past 6 mos or next 3 mos     Recent Outpatient Visits              3 weeks ago B12 deficiency    [address]    Nurse Only    1 month ago Encounter for therapeutic drug monitoring    [address] Analilia Preston MD    Telemedicine    1 month ago B12 deficiency    [address]    Nurse Only    2 months ago Acute non-recurrent frontal sinusitis    [address]    Office Visit    2 months ago Vitamin B12 deficiency    [address]    Nurse Only          Future  Appointments         Provider Department Appt Notes    In 1 week Ralph Macias DO [facility], [address] [place] [address] Physical    In 1 month Analilia Preston MD [facility], [address] [place] [address]                Passed - Last Refill > 30 days     Last rescue inhaler refill: 12/5/2023

## 2024-03-04 NOTE — TELEPHONE ENCOUNTER
Patient's call transferred from the phone room.     Patient denies that she requested refill request.  She denies any respiratory symptoms at this time.     Refill request denied with note to pharmacy stating that patient did not request refill request.

## 2024-03-07 NOTE — TELEPHONE ENCOUNTER
Application was not sent in timely must redo per notice in epic  Re-entered in epic  Awaiting decision.

## 2024-03-19 RX ORDER — DUPILUMAB 300 MG/2ML
1 INJECTION, SOLUTION SUBCUTANEOUS
Qty: 4 ML | Refills: 2 | Status: SHIPPED | OUTPATIENT
Start: 2024-03-19

## 2024-03-19 NOTE — TELEPHONE ENCOUNTER
Patient last seen in Allergy 10/7/2023 for . . .    Chronic ethmoidal sinusitis  (primary encounter diagnosis)  Nasal polyp  Moderate persistent extrinsic asthma without complication  Allergic rhinitis, unspecified seasonality, unspecified trigger  Covid-19 vaccine series completed  Flu vaccine need    Requested Prescriptions   Pending Prescriptions Disp Refills    DUPIXENT 300 MG/2ML Subcutaneous Solution Pen-injector [Pharmacy Med Name: DUPIXENT 300 MG/2ML SOLUTION PEN-INJECTOR] 4 each 0     Sig: INJECT 1 PEN UNDER THE SKIN (SUBCUTANEOUS INJECTION) EVERY 2 WEEKS       Biologic Medications Passed - 3/19/2024  2:26 AM        Passed - Appt in past 6 mos or next 3 mos     Recent Outpatient Visits              1 month ago B12 deficiency    [address]    Nurse Only    1 month ago Encounter for therapeutic drug monitoring    [address] Analilia Preston MD    Telemedicine    2 months ago B12 deficiency    [address]    Nurse Only    3 months ago Acute non-recurrent frontal sinusitis    [address] Ralph Macias DO    Office Visit    3 months ago Vitamin B12 deficiency    [address]    Nurse Only          Future Appointments         Provider Department Appt Notes    In 1 month Ralph Macias DO [address] Annual  11/3/22    In 1 month Analilia Preston MD [address]                   Dupilumab (DUPIXENT) 300 MG/2ML Subcutaneous Solution Pen-injector 4 mL 2 3/19/2024 --    Sig - Route: Inject 1 Pen into the skin every 14 (fourteen) days. - Subcutaneous    Sent to pharmacy as: Dupixent 300 MG/2ML Subcutaneous Solution Pen-injector (Dupilumab)    E-Prescribing Status: Receipt confirmed by pharmacy (3/19/2024 10:27 AM CDT)       Pharmacy    [facility] - [place], [place] - [address] [facility] [phone], [phone]     Prescription as above sent to pharmacy as above per protocol.

## 2024-04-09 DIAGNOSIS — L57.0 ACTINIC KERATOSIS: ICD-10-CM

## 2024-04-09 RX ORDER — PREDNISONE 10 MG/1
TABLET ORAL
Qty: 38 TABLET | Refills: 0 | Status: SHIPPED | OUTPATIENT
Start: 2024-04-09

## 2024-04-09 RX ORDER — TRETINOIN 0.5 MG/G
CREAM TOPICAL
Qty: 45 G | Refills: 1 | Status: SHIPPED | OUTPATIENT
Start: 2024-04-09

## 2024-04-09 RX ORDER — DOXYCYCLINE HYCLATE 100 MG/1
100 CAPSULE ORAL 2 TIMES DAILY
Qty: 20 CAPSULE | Refills: 0 | Status: SHIPPED | OUTPATIENT
Start: 2024-04-09 | End: 2024-04-19

## 2024-04-13 ENCOUNTER — LAB ENCOUNTER (OUTPATIENT)
Dept: LAB | Age: 49
End: 2024-04-13
Attending: INTERNAL MEDICINE
Payer: COMMERCIAL

## 2024-04-13 DIAGNOSIS — R07.9 INTERMITTENT CHEST PAIN: ICD-10-CM

## 2024-04-13 DIAGNOSIS — R79.89 ELEVATED SERUM CREATININE: ICD-10-CM

## 2024-04-13 DIAGNOSIS — E03.9 ACQUIRED HYPOTHYROIDISM: ICD-10-CM

## 2024-04-13 DIAGNOSIS — E03.9 HYPOTHYROIDISM, UNSPECIFIED TYPE: ICD-10-CM

## 2024-04-13 DIAGNOSIS — Z00.00 BLOOD TESTS FOR ROUTINE GENERAL PHYSICAL EXAMINATION: ICD-10-CM

## 2024-04-13 LAB
ALBUMIN SERPL-MCNC: 3.7 G/DL (ref 3.4–5)
ALBUMIN/GLOB SERPL: 1.1 {RATIO} (ref 1–2)
ALP LIVER SERPL-CCNC: 43 U/L
ALT SERPL-CCNC: 26 U/L
ANION GAP SERPL CALC-SCNC: 5 MMOL/L (ref 0–18)
AST SERPL-CCNC: 10 U/L (ref 15–37)
BASOPHILS # BLD AUTO: 0.13 X10(3) UL (ref 0–0.2)
BASOPHILS NFR BLD AUTO: 1.5 %
BILIRUB SERPL-MCNC: 0.8 MG/DL (ref 0.1–2)
BILIRUB UR QL STRIP.AUTO: NEGATIVE
BUN BLD-MCNC: 14 MG/DL (ref 9–23)
CALCIUM BLD-MCNC: 8.9 MG/DL (ref 8.5–10.1)
CHLORIDE SERPL-SCNC: 106 MMOL/L (ref 98–112)
CHOLEST SERPL-MCNC: 179 MG/DL (ref ?–200)
CLARITY UR REFRACT.AUTO: CLEAR
CO2 SERPL-SCNC: 26 MMOL/L (ref 21–32)
COLOR UR AUTO: YELLOW
CREAT BLD-MCNC: 0.9 MG/DL
EGFRCR SERPLBLD CKD-EPI 2021: 78 ML/MIN/1.73M2 (ref 60–?)
EOSINOPHIL # BLD AUTO: 0.65 X10(3) UL (ref 0–0.7)
EOSINOPHIL NFR BLD AUTO: 7.5 %
ERYTHROCYTE [DISTWIDTH] IN BLOOD BY AUTOMATED COUNT: 13.2 %
FASTING PATIENT LIPID ANSWER: YES
FASTING STATUS PATIENT QL REPORTED: YES
GLOBULIN PLAS-MCNC: 3.5 G/DL (ref 2.8–4.4)
GLUCOSE BLD-MCNC: 72 MG/DL (ref 70–99)
GLUCOSE UR STRIP.AUTO-MCNC: NORMAL MG/DL
HCT VFR BLD AUTO: 44.8 %
HDLC SERPL-MCNC: 67 MG/DL (ref 40–59)
HGB BLD-MCNC: 15 G/DL
IMM GRANULOCYTES # BLD AUTO: 0.05 X10(3) UL (ref 0–1)
IMM GRANULOCYTES NFR BLD: 0.6 %
KETONES UR STRIP.AUTO-MCNC: NEGATIVE MG/DL
LDLC SERPL CALC-MCNC: 94 MG/DL (ref ?–100)
LEUKOCYTE ESTERASE UR QL STRIP.AUTO: NEGATIVE
LYMPHOCYTES # BLD AUTO: 2.45 X10(3) UL (ref 1–4)
LYMPHOCYTES NFR BLD AUTO: 28.1 %
MCH RBC QN AUTO: 30.6 PG (ref 26–34)
MCHC RBC AUTO-ENTMCNC: 33.5 G/DL (ref 31–37)
MCV RBC AUTO: 91.4 FL
MONOCYTES # BLD AUTO: 0.66 X10(3) UL (ref 0.1–1)
MONOCYTES NFR BLD AUTO: 7.6 %
NEUTROPHILS # BLD AUTO: 4.78 X10 (3) UL (ref 1.5–7.7)
NEUTROPHILS # BLD AUTO: 4.78 X10(3) UL (ref 1.5–7.7)
NEUTROPHILS NFR BLD AUTO: 54.7 %
NITRITE UR QL STRIP.AUTO: NEGATIVE
NONHDLC SERPL-MCNC: 112 MG/DL (ref ?–130)
OSMOLALITY SERPL CALC.SUM OF ELEC: 283 MOSM/KG (ref 275–295)
PH UR STRIP.AUTO: 5.5 [PH] (ref 5–8)
PLATELET # BLD AUTO: 279 10(3)UL (ref 150–450)
POTASSIUM SERPL-SCNC: 3.6 MMOL/L (ref 3.5–5.1)
PROT SERPL-MCNC: 7.2 G/DL (ref 6.4–8.2)
PROT UR STRIP.AUTO-MCNC: NEGATIVE MG/DL
RBC # BLD AUTO: 4.9 X10(6)UL
RBC UR QL AUTO: NEGATIVE
SODIUM SERPL-SCNC: 137 MMOL/L (ref 136–145)
SP GR UR STRIP.AUTO: >1.03 (ref 1–1.03)
T4 FREE SERPL-MCNC: 0.7 NG/DL (ref 0.8–1.7)
TRIGL SERPL-MCNC: 100 MG/DL (ref 30–149)
TSI SER-ACNC: >100 MIU/ML (ref 0.36–3.74)
UROBILINOGEN UR STRIP.AUTO-MCNC: NORMAL MG/DL
VLDLC SERPL CALC-MCNC: 16 MG/DL (ref 0–30)
WBC # BLD AUTO: 8.7 X10(3) UL (ref 4–11)

## 2024-04-13 PROCEDURE — 36415 COLL VENOUS BLD VENIPUNCTURE: CPT

## 2024-04-13 PROCEDURE — 84439 ASSAY OF FREE THYROXINE: CPT

## 2024-04-13 PROCEDURE — 80061 LIPID PANEL: CPT

## 2024-04-13 PROCEDURE — 85025 COMPLETE CBC W/AUTO DIFF WBC: CPT

## 2024-04-13 PROCEDURE — 81003 URINALYSIS AUTO W/O SCOPE: CPT

## 2024-04-13 PROCEDURE — 84443 ASSAY THYROID STIM HORMONE: CPT

## 2024-04-13 PROCEDURE — 80053 COMPREHEN METABOLIC PANEL: CPT

## 2024-04-15 DIAGNOSIS — E03.9 HYPOTHYROIDISM, UNSPECIFIED TYPE: Primary | ICD-10-CM

## 2024-04-16 RX ORDER — LEVOTHYROXINE SODIUM 175 UG/1
175 TABLET ORAL
Qty: 90 TABLET | Refills: 0 | Status: SHIPPED | OUTPATIENT
Start: 2024-04-16

## 2024-04-18 ENCOUNTER — OFFICE VISIT (OUTPATIENT)
Dept: FAMILY MEDICINE CLINIC | Facility: CLINIC | Age: 49
End: 2024-04-18
Payer: COMMERCIAL

## 2024-04-18 VITALS
RESPIRATION RATE: 14 BRPM | SYSTOLIC BLOOD PRESSURE: 106 MMHG | OXYGEN SATURATION: 98 % | WEIGHT: 156 LBS | HEART RATE: 90 BPM | HEIGHT: 63 IN | TEMPERATURE: 98 F | BODY MASS INDEX: 27.64 KG/M2 | DIASTOLIC BLOOD PRESSURE: 74 MMHG

## 2024-04-18 DIAGNOSIS — Z00.00 ROUTINE GENERAL MEDICAL EXAMINATION AT A HEALTH CARE FACILITY: Primary | ICD-10-CM

## 2024-04-18 DIAGNOSIS — E03.9 ACQUIRED HYPOTHYROIDISM: ICD-10-CM

## 2024-04-18 DIAGNOSIS — Z12.83 SKIN CANCER SCREENING: ICD-10-CM

## 2024-04-18 DIAGNOSIS — J45.40 MODERATE PERSISTENT ASTHMA WITHOUT COMPLICATION (HCC): ICD-10-CM

## 2024-04-18 PROCEDURE — 99396 PREV VISIT EST AGE 40-64: CPT | Performed by: FAMILY MEDICINE

## 2024-04-18 PROCEDURE — 3008F BODY MASS INDEX DOCD: CPT | Performed by: FAMILY MEDICINE

## 2024-04-18 PROCEDURE — 96127 BRIEF EMOTIONAL/BEHAV ASSMT: CPT | Performed by: FAMILY MEDICINE

## 2024-04-18 PROCEDURE — 3074F SYST BP LT 130 MM HG: CPT | Performed by: FAMILY MEDICINE

## 2024-04-18 PROCEDURE — 3078F DIAST BP <80 MM HG: CPT | Performed by: FAMILY MEDICINE

## 2024-04-18 NOTE — PROGRESS NOTES
HPI:   Jamila Vivar is a 48 year old female who presents for a complete physical exam. Symptoms: denies discharge, itching, burning or dysuria. Patient complains of nothing today.  Denies any recent illnesses or hospitalizations.  Mammogram up to date.  Denies any family history of colon cancer.  Overdue for screening colonoscopy.    The patient presents for recheck of asthma sx's. Lately the patient's asthma has been under good control. When symptoms occur they are described as wheezing and non-productive cough.  Pt has been admitted to the ER or hospital for asthma in the past. Pt has been on steroids for asthma attacks in the past. There is a family hx of asthma. When exercising there is not wheezing.    Pt had a history of hypothyroidism and here to recheck. Has been tolerating the medication well.  TSH high recently.. Denies any shakiness, palpitations, increased BM's or wgt loss. + fatigue and wgt gain.        Wt Readings from Last 6 Encounters:   04/18/24 156 lb (70.8 kg)   12/13/23 145 lb (65.8 kg)   12/07/23 142 lb (64.4 kg)   11/07/23 148 lb (67.1 kg)   11/06/23 146 lb 3.2 oz (66.3 kg)   10/18/23 153 lb (69.4 kg)     Body mass index is 27.63 kg/m².     Results for orders placed or performed in visit on 04/13/24   TSH and Free T4   Result Value Ref Range    Free T4 0.7 (L) 0.8 - 1.7 ng/dL    TSH >100.000 (H) 0.358 - 3.740 mIU/mL   Lipid Panel [E]   Result Value Ref Range    Cholesterol, Total 179 <200 mg/dL    HDL Cholesterol 67 (H) 40 - 59 mg/dL    Triglycerides 100 30 - 149 mg/dL    LDL Cholesterol 94 <100 mg/dL    VLDL 16 0 - 30 mg/dL    Non HDL Chol 112 <130 mg/dL    Patient Fasting for Lipid? Yes    Urinalysis, Routine [E]   Result Value Ref Range    Urine Color Yellow Yellow    Clarity Urine Clear Clear    Spec Gravity >1.030 (H) 1.005 - 1.030    Glucose Urine Normal Normal mg/dL    Bilirubin Urine Negative Negative    Ketones Urine Negative Negative mg/dL    Blood Urine Negative Negative    pH Urine  5.5 5.0 - 8.0    Protein Urine Negative Negative mg/dL    Urobilinogen Urine Normal Normal mg/dL    Nitrite Urine Negative Negative    Leukocyte Esterase Urine Negative Negative    Microscopic Microscopic not indicated    Comp Metabolic Panel (14) [E]   Result Value Ref Range    Glucose 72 70 - 99 mg/dL    Sodium 137 136 - 145 mmol/L    Potassium 3.6 3.5 - 5.1 mmol/L    Chloride 106 98 - 112 mmol/L    CO2 26.0 21.0 - 32.0 mmol/L    Anion Gap 5 0 - 18 mmol/L    BUN 14 9 - 23 mg/dL    Creatinine 0.90 0.55 - 1.02 mg/dL    Calcium, Total 8.9 8.5 - 10.1 mg/dL    Calculated Osmolality 283 275 - 295 mOsm/kg    eGFR-Cr 78 >=60 mL/min/1.73m2    AST 10 (L) 15 - 37 U/L    ALT 26 13 - 56 U/L    Alkaline Phosphatase 43 39 - 100 U/L    Bilirubin, Total 0.8 0.1 - 2.0 mg/dL    Total Protein 7.2 6.4 - 8.2 g/dL    Albumin 3.7 3.4 - 5.0 g/dL    Globulin  3.5 2.8 - 4.4 g/dL    A/G Ratio 1.1 1.0 - 2.0    Patient Fasting for CMP? Yes    CBC W/ DIFFERENTIAL   Result Value Ref Range    WBC 8.7 4.0 - 11.0 x10(3) uL    RBC 4.90 3.80 - 5.30 x10(6)uL    HGB 15.0 12.0 - 16.0 g/dL    HCT 44.8 35.0 - 48.0 %    .0 150.0 - 450.0 10(3)uL    MCV 91.4 80.0 - 100.0 fL    MCH 30.6 26.0 - 34.0 pg    MCHC 33.5 31.0 - 37.0 g/dL    RDW 13.2 %    Neutrophil Absolute Prelim 4.78 1.50 - 7.70 x10 (3) uL    Neutrophil Absolute 4.78 1.50 - 7.70 x10(3) uL    Lymphocyte Absolute 2.45 1.00 - 4.00 x10(3) uL    Monocyte Absolute 0.66 0.10 - 1.00 x10(3) uL    Eosinophil Absolute 0.65 0.00 - 0.70 x10(3) uL    Basophil Absolute 0.13 0.00 - 0.20 x10(3) uL    Immature Granulocyte Absolute 0.05 0.00 - 1.00 x10(3) uL    Neutrophil % 54.7 %    Lymphocyte % 28.1 %    Monocyte % 7.6 %    Eosinophil % 7.5 %    Basophil % 1.5 %    Immature Granulocyte % 0.6 %       Current Outpatient Medications   Medication Sig Dispense Refill    levothyroxine 175 MCG Oral Tab Take 1 tablet (175 mcg total) by mouth before breakfast. 90 tablet 0    Tretinoin 0.05 % External Cream APPLY  TOPICALLY TO AFFECTED AREA AT BEDTIME AS NEEDED 45 g 1    doxycycline 100 MG Oral Cap Take 1 capsule (100 mg total) by mouth 2 (two) times daily for 10 days. 20 capsule 0    predniSONE 10 MG Oral Tab Take 40mg q day x 5 days,then 30mg x 3 days,then 20 mg x 3 days,then10 mg x 3 days with food 38 tablet 0    Dupilumab (DUPIXENT) 300 MG/2ML Subcutaneous Solution Pen-injector Inject 1 Pen into the skin every 14 (fourteen) days. 4 mL 2    cetirizine 10 MG Oral Tab TAKE 1 TABLET BY MOUTH EVERY DAY 90 tablet 0    Phentermine HCl 37.5 MG Oral Tab Take 1 tablet (37.5 mg total) by mouth before breakfast. 30 tablet 2    buPROPion  MG Oral Tablet 12 Hr TAKE 1 TABLET(150 MG) BY MOUTH TWICE DAILY 180 tablet 0    topiramate 100 MG Oral Tab Take 1 tablet (100 mg total) by mouth 2 (two) times daily. 180 tablet 0    semaglutide 4 MG/3ML Subcutaneous Solution Pen-injector Inject 1 mg into the skin once a week. 9 mL 0    Levonorgestrel-Ethinyl Estrad (AVIANE) 0.1-20 MG-MCG Oral Tab Take 1 tablet by mouth daily. 84 tablet 3    fluticasone-umeclidin-vilant (TRELEGY ELLIPTA) 200-62.5-25 MCG/ACT Inhalation Aerosol Powder, Breath Activated Inhale 1 puff into the lungs daily. 3 each 1    Insulin Pen Needle (PEN NEEDLES) 32G X 4 MM Does not apply Misc Inject 1 each into the skin daily. 100 each 1    albuterol 108 (90 Base) MCG/ACT Inhalation Aero Soln Inhale 2 puffs into the lungs every 4 (four) hours as needed for Wheezing. 3 each 1    triamcinolone 0.1 % External Cream Apply topically 2 (two) times daily as needed. 60 g 3    albuterol sulfate (2.5 MG/3ML) 0.083% Inhalation Nebu Soln Take 3 mL (2.5 mg total) by nebulization every 4 (four) hours as needed for Wheezing. 1 Box 1      Allergies   Allergen Reactions    Aspirin Tightness in Throat    Levaquin [Levofloxacin Hemihydrate] RASH    Nsaids     Penicillins      As a Child      Past Medical History:    Brain tumor (benign) (HCC)    Chronic rhinitis    Extrinsic asthma, unspecified     Hypothyroid    Hypothyroidism    Nasal polyps      Past Surgical History:   Procedure Laterality Date    D & c  2003    Cervical Stump    Other surgical history  5/2012    NASAL POLYP DR GIBBS    Other surgical history  09/21/2021    uterine ablasion    Other surgical history Right 10/27/2021    Lasik redo      Family History   Problem Relation Age of Onset    Cancer Mother         Brain    Cancer Maternal Grandmother         stomach cancer?    Diabetes Maternal Grandmother     Diabetes Maternal Grandfather     Heart Disorder Maternal Grandfather     Cancer Maternal Grandfather         unknown cancer    Suicide History Father     No Known Problems Sister       Social History:   Social History     Socioeconomic History    Marital status:    Tobacco Use    Smoking status: Never    Smokeless tobacco: Never   Vaping Use    Vaping status: Never Used   Substance and Sexual Activity    Alcohol use: Not Currently     Alcohol/week: 1.0 standard drink of alcohol     Types: 1 Glasses of wine per week     Comment: 1-2 drinks/wk    Drug use: No    Sexual activity: Yes     Partners: Male     Birth control/protection: OCP   Other Topics Concern    Caffeine Concern Yes     Comment: 1-2 cokes/day    Exercise Yes     Comment: 2xweek cardio    Seat Belt Yes     Job: [employer], 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  : yes. Children: yes.   Exercise:  4-5 times per week, [employer].  Diet: watches fats closely, watches sugar closely and watches calories closely     REVIEW OF SYSTEMS:   GENERAL: feels well otherwise  SKIN: denies any unusual skin lesions  EYES:denies blurred vision or double vision  HEENT: + nasal congestion, sinus pain or ST  LUNGS: denies shortness of breath with exertion, cough  CARDIOVASCULAR: denies chest pain on exertion or at rest, denies palpitations  GI: denies abdominal pain,denies heartburn, denies n/v/d/c/blood in stool  : denies dysuria, vaginal discharge or itching  MUSCULOSKELETAL: denies  low back pain  NEURO: denies headaches, denies LH/dizziness/syncope  PSYCHE: denies depression or anxiety  HEMATOLOGIC: denies hx of anemia  ENDOCRINE: denies thyroid history  ALL/ASTHMA: denies hx of allergy or asthma    EXAM:   /74   Pulse 90   Temp 97.7 °F (36.5 °C) (Temporal)   Resp 14   Ht 5' 3\" (1.6 m)   Wt 156 lb (70.8 kg)   LMP 04/04/2024 (Approximate)   SpO2 98%   BMI 27.63 kg/m²   Body mass index is 27.63 kg/m².   GENERAL: well developed, well nourished,in no apparent distress  SKIN: no rashes,no suspicious lesions  HEENT: atraumatic, normocephalic,ears and throat are clear  EYES:PERRLA, EOMI, normal optic disk,conjunctiva are clear  NECK: supple,no adenopathy  BREAST:DEFERRED TO GYNE  LUNGS: clear to auscultation; no rhonchi, rales, or wheezing  CARDIO: RRR without murmur  GI: good BS's,no masses, HSM or tenderness  :DEFERRED TO GYNE   MUSCULOSKELETAL: back is not tender,FROM of the back  EXTREMITIES: no cyanosis, clubbing or edema  NEURO: Oriented times three,cranial nerves are intact,motor and sensory are grossly intact; 2+ knee reflexes bilaterally  VASCULAR: 2 + dorsalis pedal pulses bilaterally    ASSESSMENT AND PLAN:   Jamila Vivar is a 48 year old female who presents for a complete physical exam.   Pap and pelvic deferred to gyne.   Mammogram up to date  Health maintenance, will check: No orders of the defined types were placed in this encounter.        Advised to have ultrafast, and 5 minute heart aware.  Advised patient to check with insurance company for coverage prior to doing the test.   Discussed diet and exercise.      Pt' s weight is Body mass index is 27.63 kg/m²., recommended low fat diet and aerobic exercise 30 minutes three times weekly.  The patient indicates understanding of these issues and agrees to the plan.    Routine general medical examination at a health care facility  (primary encounter diagnosis)    Skin cancer screening - refer to dermatology    Moderate  persistent asthma with exacerbation - stable, cpm    Colon cancer screening - GI referral provided    Acquired hypothyroidism - TSH high - increased synthroid to 175mcg daily recently, repeat TSH and free T4 in 6 weeks      Meds & Refills for this Visit:  Requested Prescriptions      No prescriptions requested or ordered in this encounter       Imaging & Consults:  DERM - INTERNAL    The patient is asked to return in 6 months for med check.

## 2024-04-23 ENCOUNTER — TELEPHONE (OUTPATIENT)
Dept: INTERNAL MEDICINE CLINIC | Facility: CLINIC | Age: 49
End: 2024-04-23

## 2024-04-25 ENCOUNTER — OFFICE VISIT (OUTPATIENT)
Dept: INTERNAL MEDICINE CLINIC | Facility: CLINIC | Age: 49
End: 2024-04-25
Payer: COMMERCIAL

## 2024-04-25 VITALS
SYSTOLIC BLOOD PRESSURE: 106 MMHG | HEIGHT: 64 IN | WEIGHT: 159 LBS | HEART RATE: 83 BPM | DIASTOLIC BLOOD PRESSURE: 60 MMHG | RESPIRATION RATE: 16 BRPM | BODY MASS INDEX: 27.14 KG/M2

## 2024-04-25 DIAGNOSIS — E78.5 DYSLIPIDEMIA: ICD-10-CM

## 2024-04-25 DIAGNOSIS — E66.3 OVERWEIGHT (BMI 25.0-29.9): ICD-10-CM

## 2024-04-25 DIAGNOSIS — Z51.81 ENCOUNTER FOR THERAPEUTIC DRUG MONITORING: Primary | ICD-10-CM

## 2024-04-25 DIAGNOSIS — E03.9 HYPOTHYROIDISM, UNSPECIFIED TYPE: ICD-10-CM

## 2024-04-25 DIAGNOSIS — G47.33 OSA (OBSTRUCTIVE SLEEP APNEA): ICD-10-CM

## 2024-04-25 PROCEDURE — 3008F BODY MASS INDEX DOCD: CPT | Performed by: INTERNAL MEDICINE

## 2024-04-25 PROCEDURE — 3078F DIAST BP <80 MM HG: CPT | Performed by: INTERNAL MEDICINE

## 2024-04-25 PROCEDURE — 99214 OFFICE O/P EST MOD 30 MIN: CPT | Performed by: INTERNAL MEDICINE

## 2024-04-25 PROCEDURE — 3074F SYST BP LT 130 MM HG: CPT | Performed by: INTERNAL MEDICINE

## 2024-04-25 NOTE — PROGRESS NOTES
HISTORY OF PRESENT ILLNESS  Chief Complaint   Patient presents with    Weight Check     Up 6        Jamila Vivar is a 48 year old female here for follow up in medical weight loss program.       Omelette in the morning   Salad mid day   Pork chop in the evening   Working 2 jobs     Gaining weight   No response to phentermine restart   Reviewed cholesterol panel   Continues to struggle with sleep apnea   Has gained weight on phentermine     Wt Readings from Last 6 Encounters:   04/25/24 159 lb (72.1 kg)   04/18/24 156 lb (70.8 kg)   12/13/23 145 lb (65.8 kg)   12/07/23 142 lb (64.4 kg)   11/07/23 148 lb (67.1 kg)   11/06/23 146 lb 3.2 oz (66.3 kg)            REVIEW OF SYSTEMS  GENERAL HEALTH: feels well otherwise, denied any fevers chills or night sweats   RESPIRATORY: denies shortness of breath   CARDIOVASCULAR: denies chest pain  GI: denies abdominal pain    EXAM  /60   Pulse 83   Resp 16   Ht 5' 4\" (1.626 m)   Wt 159 lb (72.1 kg)   LMP 04/04/2024 (Approximate)   BMI 27.29 kg/m²   GENERAL: well developed, well nourished,in no apparent distress, A/O x3  SKIN: no rashes,no suspicious lesions  HEENT: atraumatic, normocephalic, OP-clear, PERRL  NECK: supple,no adenopathy  LUNGS: clear to auscultation bilaterally   CARDIO: RRR without murmur  GI: good BS's,NT/ND, no masses or HSM  EXTREMITIES: no cyanosis, no clubbing, no edema    Lab Results   Component Value Date    WBC 8.7 04/13/2024    RBC 4.90 04/13/2024    HGB 15.0 04/13/2024    HCT 44.8 04/13/2024    MCV 91.4 04/13/2024    MCH 30.6 04/13/2024    MCHC 33.5 04/13/2024    RDW 13.2 04/13/2024    .0 04/13/2024    MPV 11.1 10/20/2012     Lab Results   Component Value Date    GLU 72 04/13/2024    BUN 14 04/13/2024    BUNCREA 10.8 12/01/2022    CREATSERUM 0.90 04/13/2024    ANIONGAP 5 04/13/2024    GFR 74 01/27/2018    GFRNAA 97 09/11/2021    GFRAA 112 09/11/2021    CA 8.9 04/13/2024    OSMOCALC 283 04/13/2024    ALKPHO 43 04/13/2024    AST 10 (L)  04/13/2024    ALT 26 04/13/2024    BILT 0.8 04/13/2024    TP 7.2 04/13/2024    ALB 3.7 04/13/2024    GLOBULIN 3.5 04/13/2024    AGRATIO 1.7 01/17/2015     04/13/2024    K 3.6 04/13/2024     04/13/2024    CO2 26.0 04/13/2024     Lab Results   Component Value Date    EAG 85 08/31/2020    A1C 4.6 08/31/2020     Lab Results   Component Value Date    CHOLEST 179 04/13/2024    TRIG 100 04/13/2024    HDL 67 (H) 04/13/2024    LDL 94 04/13/2024    VLDL 16 04/13/2024    TCHDLRATIO 2.74 01/27/2018    NONHDLC 112 04/13/2024     Lab Results   Component Value Date    T4F 0.7 (L) 04/13/2024    TSH >100.000 (H) 04/13/2024    TSHT4 0.04 (L) 10/21/2015     Lab Results   Component Value Date    B12 389 10/18/2023     Lab Results   Component Value Date    VITD 33.0 10/18/2023       Current Outpatient Medications on File Prior to Visit   Medication Sig Dispense Refill    levothyroxine 175 MCG Oral Tab Take 1 tablet (175 mcg total) by mouth before breakfast. 90 tablet 0    Tretinoin 0.05 % External Cream APPLY TOPICALLY TO AFFECTED AREA AT BEDTIME AS NEEDED 45 g 1    predniSONE 10 MG Oral Tab Take 40mg q day x 5 days,then 30mg x 3 days,then 20 mg x 3 days,then10 mg x 3 days with food 38 tablet 0    Dupilumab (DUPIXENT) 300 MG/2ML Subcutaneous Solution Pen-injector Inject 1 Pen into the skin every 14 (fourteen) days. 4 mL 2    cetirizine 10 MG Oral Tab TAKE 1 TABLET BY MOUTH EVERY DAY 90 tablet 0    buPROPion  MG Oral Tablet 12 Hr TAKE 1 TABLET(150 MG) BY MOUTH TWICE DAILY 180 tablet 0    topiramate 100 MG Oral Tab Take 1 tablet (100 mg total) by mouth 2 (two) times daily. 180 tablet 0    Levonorgestrel-Ethinyl Estrad (AVIANE) 0.1-20 MG-MCG Oral Tab Take 1 tablet by mouth daily. 84 tablet 3    fluticasone-umeclidin-vilant (TRELEGY ELLIPTA) 200-62.5-25 MCG/ACT Inhalation Aerosol Powder, Breath Activated Inhale 1 puff into the lungs daily. 3 each 1    Insulin Pen Needle (PEN NEEDLES) 32G X 4 MM Does not apply Misc Inject 1  each into the skin daily. 100 each 1    albuterol 108 (90 Base) MCG/ACT Inhalation Aero Soln Inhale 2 puffs into the lungs every 4 (four) hours as needed for Wheezing. 3 each 1    triamcinolone 0.1 % External Cream Apply topically 2 (two) times daily as needed. 60 g 3    albuterol sulfate (2.5 MG/3ML) 0.083% Inhalation Nebu Soln Take 3 mL (2.5 mg total) by nebulization every 4 (four) hours as needed for Wheezing. 1 Box 1     No current facility-administered medications on file prior to visit.       ASSESSMENT  Analyzed weight data:       Diagnoses and all orders for this visit:    Encounter for therapeutic drug monitoring    Overweight (BMI 25.0-29.9)    Hypothyroidism, unspecified type    BRENDAN (obstructive sleep apnea)    Dyslipidemia    Other orders  -     semaglutide-weight management 0.25 MG/0.5ML Subcutaneous Solution Auto-injector; Inject 0.5 mL (0.25 mg total) into the skin once a week for 4 doses.  -     semaglutide-weight management 0.5 MG/0.5ML Subcutaneous Solution Auto-injector; Inject 0.5 mL (0.5 mg total) into the skin once a week for 4 doses.  -     semaglutide-weight management 1 MG/0.5ML Subcutaneous Solution Auto-injector; Inject 0.5 mL (1 mg total) into the skin once a week for 4 doses.            PLAN  Initial consult: 8/2/16 with [facility]   Weight max 4/3/19 on 179 lb    Total time spent on chart review, pre-charting, obtaining history, counseling, and educating, reviewing labs was 30 minutes.  Resume wegovy   -advised of side effects and adverse effects of this medication  Injection teaching in OV   Failed phentermine   -advised of side effects and adverse effects of this medication  Tried saxenda in the past : No further weight loss  Cannot take contrave on wellbutrin     Continue topamax and wellbutrin xl 150   Needs to track nutrition, increase vegetables / protein especially     We reviewed limitations of medication management without lifestyle adjustment   Nutrition: low carb diet/ recommended  to eat breakfast daily/ regular protein intake  Medication use and side effects reviewed with patient.  Medication contraindications:phendimetrazine   Follow up with dietitian and psychologist as recommended.  Discussed the role of sleep and stress in weight management.  Counseled on comprehensive weight loss plan including attention to nutrition, exercise and behavior/stress management for success. See patient instruction below for more details.  Discussed strategies to overcome barriers to successful weight loss and weight maintenance  FITTE: ACSM recommendations (150-300 minutes/ week in active weight loss)    There are no Patient Instructions on file for this visit.    No follow-ups on file.    Patient verbalizes understanding.    Analilia Preston MD

## 2024-04-26 ENCOUNTER — TELEPHONE (OUTPATIENT)
Dept: INTERNAL MEDICINE CLINIC | Facility: CLINIC | Age: 49
End: 2024-04-26

## 2024-04-26 NOTE — TELEPHONE ENCOUNTER
Prior authorization approved  Payer: [employer] [facility] Case ID: [id number]    [phone]  Note from payer: The case has been Approved from  [id number]  Approval Details    Authorized from March 26, 2024 to April 25, 2025  Electronic appeal: Not supported  View History

## 2024-04-29 ENCOUNTER — MED REC SCAN ONLY (OUTPATIENT)
Dept: FAMILY MEDICINE CLINIC | Facility: CLINIC | Age: 49
End: 2024-04-29

## 2024-05-12 DIAGNOSIS — J30.2 SEASONAL ALLERGIES: ICD-10-CM

## 2024-05-13 RX ORDER — CETIRIZINE HYDROCHLORIDE 10 MG/1
TABLET ORAL
Qty: 90 TABLET | Refills: 0 | Status: SHIPPED | OUTPATIENT
Start: 2024-05-13

## 2024-06-06 RX ORDER — DUPILUMAB 300 MG/2ML
1 INJECTION, SOLUTION SUBCUTANEOUS
Qty: 4 EACH | Refills: 1 | Status: SHIPPED | OUTPATIENT
Start: 2024-06-06

## 2024-06-06 NOTE — TELEPHONE ENCOUNTER
Refill requested for   Name from pharmacy: DUPIXENT 300 MG/2ML SOLUTION PEN-INJECTOR          Will file in chart as: DUPIXENT 300 MG/2ML Subcutaneous Solution Pen-injector    Sig: INJECT 1 PEN UNDER THE SKIN (SUBCUTANEOUS INJECTION) EVERY 14 DAYS.    Disp: 4 each    Refills: 0    Start: 6/5/2024    Class: Normal    Non-formulary    Last ordered: 2 months ago (3/19/2024) by Ivan Ceballos MD    Last refill: 5/13/2024    Rx #: [id number]    Biologic Medications Dpykkp0006/05/2024 02:23 AM   Protocol Details Appt in past 6 mos or next 3 mos      To be filled at: [employer] [facility] [address] [facility] [phone], [phone]          Last office visit: 10/07/2023    Previously advised to follow up in Follow-up in 1 year or sooner if needed     F/U currently scheduled? Not at this time. [employer] message sent to follow up in October.         ACTION: Refilled per protocol.

## 2024-06-18 ENCOUNTER — PATIENT MESSAGE (OUTPATIENT)
Dept: INTERNAL MEDICINE CLINIC | Facility: CLINIC | Age: 49
End: 2024-06-18

## 2024-06-18 NOTE — TELEPHONE ENCOUNTER
From: Jamila Vivar  To: Analilia Preston  Sent: 6/18/2024 11:07 AM CDT  Subject: Medication    Hi I tried to fill my medication the third month which is the 1mg and it says I need a prior authorization which I have already it’s good for a year and says it covers each stage as long as my benefits are the same which they are.     Jamila Vivar

## 2024-07-02 ENCOUNTER — PATIENT MESSAGE (OUTPATIENT)
Dept: INTERNAL MEDICINE CLINIC | Facility: CLINIC | Age: 49
End: 2024-07-02

## 2024-07-02 NOTE — TELEPHONE ENCOUNTER
From: Jamila Vivar  To: Analilia Preston  Sent: 7/2/2024 8:45 AM CDT  Subject: Medication     Hi Dr Preston recently switched me over to Wegovy I started on the lowest dosage and she gave me RX scripts up to 1mg which now I’m finally starting to lose some pounds. I am out of scripts so I do need the next dosages if you can submit them to [facility] in [place]. I do have the authorization that is good for one year so the insurance did approve it.     Thanks   Jamila Vivar

## 2024-07-02 NOTE — TELEPHONE ENCOUNTER
Requesting   Wegovy increase     LOV: 4/25/24  RTC: not noted  Filled: 1mg 4/25/24 #2 with 0 refills    Future Appointments   Date Time Provider Department Center   9/23/2024 12:20 PM Aanlilia Preston MD [facility] [address]   10/21/2024  6:00 PM Ivan Ceballos MD [facility]     Pt took last dose of 1mg yesterday, will you increase dose ?

## 2024-07-16 DIAGNOSIS — J33.9 NASAL POLYP: Primary | ICD-10-CM

## 2024-07-16 RX ORDER — DUPILUMAB 300 MG/2ML
1 INJECTION, SOLUTION SUBCUTANEOUS
Qty: 4 EACH | Refills: 1 | Status: SHIPPED | OUTPATIENT
Start: 2024-07-16

## 2024-07-16 NOTE — TELEPHONE ENCOUNTER
Medication last prescribed 6/6/2024.  Patient has an Allergy Physician Follow-Up Appointment scheduled for 10/21/2024.     Please advise on refill.             Fax received from [facility] asking for refill of Dupixent 300 mg/2mL prefilled syringe.       Patient last seen in Allergy 10/7/2023 for . . .    Chronic ethmoidal sinusitis  (primary encounter diagnosis)  Nasal polyp  Moderate persistent extrinsic asthma without complication  Allergic rhinitis, unspecified seasonality, unspecified trigger  Covid-19 vaccine series completed  Flu vaccine need       Requested Prescriptions   Pending Prescriptions Disp Refills    Dupilumab (DUPIXENT) 300 MG/2ML Subcutaneous Solution Pen-injector 4 each 1     Sig: Inject 1 Pen into the skin every 14 (fourteen) days.       Biologic Medications Failed - 7/16/2024  4:30 PM        Failed - Appt in past 6 mos or next 3 mos     Recent Outpatient Visits              2 months ago Encounter for therapeutic drug monitoring    [address] Analilia Preston MD    Office Visit    2 months ago Routine general medical examination at a health care facility    [address]    Office Visit    5 months ago B12 deficiency    [address]    Nurse Only    5 months ago Encounter for therapeutic drug monitoring    [address] Analilia Preston MD    Telemedicine    6 months ago B12 deficiency    [address]    Nurse Only          Future Appointments         Provider Department Appt Notes    In 2 months Analilia Preston MD [address] Weight loss    In 3 months Ivan Ceballos MD [facility] Follow up

## 2024-08-02 DIAGNOSIS — J30.2 SEASONAL ALLERGIES: ICD-10-CM

## 2024-08-02 RX ORDER — CETIRIZINE HYDROCHLORIDE 10 MG/1
TABLET ORAL
Qty: 90 TABLET | Refills: 0 | Status: SHIPPED | OUTPATIENT
Start: 2024-08-02

## 2024-08-12 ENCOUNTER — MED REC SCAN ONLY (OUTPATIENT)
Dept: FAMILY MEDICINE CLINIC | Facility: CLINIC | Age: 49
End: 2024-08-12

## 2024-08-22 DIAGNOSIS — J33.9 NASAL POLYP: ICD-10-CM

## 2024-08-22 DIAGNOSIS — L57.0 ACTINIC KERATOSIS: ICD-10-CM

## 2024-08-22 DIAGNOSIS — J30.2 SEASONAL ALLERGIES: ICD-10-CM

## 2024-08-22 RX ORDER — LEVOTHYROXINE SODIUM 175 UG/1
175 TABLET ORAL
Qty: 90 TABLET | Refills: 0 | Status: SHIPPED | OUTPATIENT
Start: 2024-08-22

## 2024-08-22 RX ORDER — CETIRIZINE HYDROCHLORIDE 10 MG/1
TABLET ORAL
Qty: 90 TABLET | Refills: 0 | Status: SHIPPED | OUTPATIENT
Start: 2024-08-22

## 2024-08-22 RX ORDER — DUPILUMAB 300 MG/2ML
1 INJECTION, SOLUTION SUBCUTANEOUS
Qty: 4 ML | Refills: 2 | Status: SHIPPED | OUTPATIENT
Start: 2024-08-22

## 2024-08-22 RX ORDER — TRETINOIN 0.5 MG/G
CREAM TOPICAL
Qty: 45 G | Refills: 1 | Status: SHIPPED | OUTPATIENT
Start: 2024-08-22

## 2024-08-22 NOTE — TELEPHONE ENCOUNTER
Last Office Visit: 4/18/24  Last Refill: 4/16/24  Return to Clinic: 6 months   Protocol: failed- levothyroxine   Passed- cetirizine   NOV: n/a  Requested Prescriptions     Pending Prescriptions Disp Refills    Tretinoin 0.05 % External Cream 45 g 1     Sig: APPLY TOPICALLY TO AFFECTED AREA AT BEDTIME AS NEEDED    levothyroxine 175 MCG Oral Tab 90 tablet 0     Sig: Take 1 tablet (175 mcg total) by mouth before breakfast.    cetirizine 10 MG Oral Tab 90 tablet 0     Sig: Take 1 tablet by mouth once daily         Please approve if appropriate.     Thank you!

## 2024-08-22 NOTE — TELEPHONE ENCOUNTER
Refill requested for   Requested Prescriptions   Pending Prescriptions Disp Refills    Dupilumab (DUPIXENT) 300 MG/2ML Subcutaneous Solution Pen-injector 4 each 1     Sig: Inject 1 Pen into the skin every 14 (fourteen) days.       Biologic Medications Passed - 8/22/2024  8:45 AM        Passed - Appt in past 6 mos or next 3 mos     Recent Outpatient Visits              3 months ago Encounter for therapeutic drug monitoring    [address] Analilia Lozano MD    Office Visit    4 months ago Routine general medical examination at a health care facility    [address]    Office Visit    6 months ago B12 deficiency    [address]    Nurse Only    7 months ago Encounter for therapeutic drug monitoring    [address] Analilia Lozano MD    Telemedicine    7 months ago B12 deficiency    [address]    Nurse Only          Future Appointments         Provider Department Appt Notes    In 1 month Analilia Preston MD [address] Weight loss    In 2 months Ivan Ceballos MD [facility] Follow up                          Last office visit: 10/07/23    Previously advised to follow up in 1 year or sooner if needed    F/U currently scheduled? 10/21/24    Date of last refill: 7/16/24    ACTION: Refilled per protocol

## 2024-08-29 ENCOUNTER — TELEPHONE (OUTPATIENT)
Dept: ALLERGY | Facility: CLINIC | Age: 49
End: 2024-08-29

## 2024-08-29 DIAGNOSIS — J33.9 NASAL POLYP: ICD-10-CM

## 2024-08-29 RX ORDER — DUPILUMAB 300 MG/2ML
1 INJECTION, SOLUTION SUBCUTANEOUS
Qty: 4 ML | Refills: 2 | OUTPATIENT
Start: 2024-08-29

## 2024-08-29 NOTE — TELEPHONE ENCOUNTER
[employer] [facility] 300 mg/2 mL prefilled syringe.     Completed form and copy of 10/7/2023 Physician Visit Note faxed to [employer] at [phone].     Await fax confirmation.

## 2024-08-29 NOTE — TELEPHONE ENCOUNTER
Fax confirmation received noting successful transmission.       Await prior authorization response from [employer].

## 2024-08-29 NOTE — TELEPHONE ENCOUNTER
Fax received from [facility] asking for refill of Dupixent 300 mg/2mL pen.    Patient last seen in Allergy 10/7/2023 for . . .    Chronic ethmoidal sinusitis  (primary encounter diagnosis)  Nasal polyp  Moderate persistent extrinsic asthma without complication  Allergic rhinitis, unspecified seasonality, unspecified trigger  Covid-19 vaccine series completed  Flu vaccine need       Requested Prescriptions   Pending Prescriptions Disp Refills    Dupilumab (DUPIXENT) 300 MG/2ML Subcutaneous Solution Pen-injector 4 mL 2     Sig: Inject 1 Pen into the skin every 14 (fourteen) days.       Biologic Medications Passed - 8/29/2024  1:29 PM        Passed - Appt in past 6 mos or next 3 mos     Recent Outpatient Visits              4 months ago Encounter for therapeutic drug monitoring    [address] Analilia Lozano MD    Office Visit    4 months ago Routine general medical examination at a health care facility    [facility] [address] [place], [place] [address]John Nicholas Adam, DO    Office Visit    6 months ago B12 deficiency    [address]    Nurse Only    7 months ago Encounter for therapeutic drug monitoring    [address]    Telemedicine    7 months ago B12 deficiency    [address]    Nurse Only          Future Appointments         Provider Department Appt Notes    In 3 weeks Analilia Preston MD [address] Weight loss    In 1 month Ivan Ceballos MD [facility] Follow up                       Dupilumab (DUPIXENT) 300 MG/2ML Subcutaneous Solution Pen-injector 4 mL 2 8/22/2024 --    Sig - Route: Inject 1 Pen into the skin every 14 (fourteen) days. - Subcutaneous    Sent to pharmacy as: Dupixent 300 MG/2ML Subcutaneous Solution  Pen-injector (Dupilumab)    E-Prescribing Status: Receipt confirmed by pharmacy (8/22/2024  9:25 AM CDT)    No prior authorization was found for this prescription.    Found prior authorization for another prescription for the same medication: Approved      Associated Diagnoses    Nasal polyp        Pharmacy    [facility] - [facility] [address] [facility] [phone], [phone]     Prescription as above with 2 refills refilled on 8/22/2024.  Prescription request denied.

## 2024-09-03 NOTE — TELEPHONE ENCOUNTER
Dupixent prior authorization denial letter received via fax from [employer].     Dupixent 300 mg/2 mL prefilled pen denied.    You must have medical records that show you are currently using a standard drug as prescribed.     1) Inhaled ICSs such as Asmanex, Flovent or Pulmicort    2) LABAs such as Advair, Dulera or Sympicort.     3) Leukotriene receptor antagonist such as Montelukast.    4) LAMA such as Spiriva    5) Theophylline          Per [place] Medication Dispense report Kevinlegy was last ordered for a 3 month supply 11/2023.

## 2024-09-03 NOTE — TELEPHONE ENCOUNTER
[employer] Dupixent 300 mg/2 mL Prefilled Pen Prior Authorization Form completed for diagnosis of Nasal Polys.     Completed prior authorization form and copy of 10/7/2023 Allergy Physician Visit Note faxed to [employer] [phone].     Await fax confirmation.

## 2024-09-05 NOTE — TELEPHONE ENCOUNTER
Dupixent 300 mg/2 mL prefilled syringe prior authorization approval letter received.     Effective Dates:    8/6/2024 - 9/5/2025      PA#: [id number]

## 2024-09-09 NOTE — TELEPHONE ENCOUNTER
[facility] contacted at [phone].     RN spoke to pharmacy service repAngie.     Reported Dupixent Prior Authorization as below . . .     Dupixent 300 mg/2 mL prefilled syringe prior authorization approval letter received.      Effective Dates:     8/6/2024 - 9/5/2025        PA#: [id number]

## 2024-09-12 RX ORDER — DOXYCYCLINE 100 MG/1
100 CAPSULE ORAL 2 TIMES DAILY
Qty: 40 CAPSULE | Refills: 0 | Status: SHIPPED | OUTPATIENT
Start: 2024-09-12 | End: 2024-10-02

## 2024-09-12 RX ORDER — PREDNISONE 10 MG/1
TABLET ORAL
Qty: 38 TABLET | Refills: 0 | OUTPATIENT
Start: 2024-09-12

## 2024-09-12 RX ORDER — PHENTERMINE HYDROCHLORIDE 37.5 MG/1
37.5 TABLET ORAL
Qty: 30 TABLET | Refills: 0 | OUTPATIENT
Start: 2024-09-12

## 2024-09-12 RX ORDER — DOXYCYCLINE 100 MG/1
100 CAPSULE ORAL 2 TIMES DAILY
Qty: 20 CAPSULE | Refills: 0 | OUTPATIENT
Start: 2024-09-12

## 2024-09-12 RX ORDER — PREDNISONE 10 MG/1
TABLET ORAL
Qty: 30 TABLET | Refills: 0 | Status: SHIPPED | OUTPATIENT
Start: 2024-09-12

## 2024-09-12 NOTE — TELEPHONE ENCOUNTER
Duplicate refill received   Appears that Dr. Ceballos sent out refills already this morning  RN called to patient to alert her that medications were sent  Duplicate refills denied per protocol

## 2024-09-13 DIAGNOSIS — J33.9 NASAL POLYP: ICD-10-CM

## 2024-09-13 RX ORDER — DUPILUMAB 300 MG/2ML
1 INJECTION, SOLUTION SUBCUTANEOUS
Qty: 4 EACH | Refills: 0 | Status: SHIPPED | OUTPATIENT
Start: 2024-09-13

## 2024-09-13 NOTE — TELEPHONE ENCOUNTER
Requested Prescriptions   Pending Prescriptions Disp Refills    DUPIXENT 300 MG/2ML Subcutaneous Solution Pen-injector [Pharmacy Med Name: DUPIXENT 300 MG/2ML SOLUTION PEN-INJECTOR] 4 each 0     Sig: INJECT 1 PEN UNDER THE SKIN (SUBCUTANEOUS INJECTION) EVERY 14 DAYS.       Biologic Medications Passed - 9/13/2024  2:15 AM        Passed - Appt in past 6 mos or next 3 mos     Recent Outpatient Visits              4 months ago Encounter for therapeutic drug monitoring    [address]    Office Visit    4 months ago Routine general medical examination at a health care facility    [facility] [address] [place], [place] [address]John Nicholas Adam, DO    Office Visit    7 months ago B12 deficiency    [address]    Nurse Only    7 months ago Encounter for therapeutic drug monitoring    [address]    Telemedicine    8 months ago B12 deficiency    [address] [place]    Nurse Only          Future Appointments         Provider Department Appt Notes    In 1 week Analilia Preston MD [address] Weight loss    In 1 month Ivan Ceballos MD [facility] Follow up                       Refilled per med refill protocol.

## 2024-09-23 ENCOUNTER — TELEMEDICINE (OUTPATIENT)
Dept: INTERNAL MEDICINE CLINIC | Facility: CLINIC | Age: 49
End: 2024-09-23
Payer: COMMERCIAL

## 2024-09-23 DIAGNOSIS — G47.33 OSA (OBSTRUCTIVE SLEEP APNEA): ICD-10-CM

## 2024-09-23 DIAGNOSIS — E78.5 DYSLIPIDEMIA: ICD-10-CM

## 2024-09-23 DIAGNOSIS — Z51.81 ENCOUNTER FOR THERAPEUTIC DRUG MONITORING: Primary | ICD-10-CM

## 2024-09-23 PROCEDURE — 99213 OFFICE O/P EST LOW 20 MIN: CPT | Performed by: INTERNAL MEDICINE

## 2024-09-23 NOTE — PROGRESS NOTES
HISTORY OF PRESENT ILLNESS  Chief Complaint   Patient presents with    Weight Check     Video         Jamila Vivar is a 48 year old female here for follow up in medical weight loss program.       Overall doing well back on wegovy. Feels she has been stuck   Weight is standstill at 1.7 mg   138 lb on her home scale    Still working at her second job and able to get lots of activity   Getting 10-12 K steps per days on her activity .       Goal : weight 130     Not feeling hunger / appetite is well controlled   Feels she is not overeating and not snacking !!       [facility] Follow Up    General Information  Nutrition Recall  Exercise     Sleep                Wt Readings from Last 6 Encounters:   04/25/24 159 lb (72.1 kg)   04/18/24 156 lb (70.8 kg)   12/13/23 145 lb (65.8 kg)   12/07/23 142 lb (64.4 kg)   11/07/23 148 lb (67.1 kg)   11/06/23 146 lb 3.2 oz (66.3 kg)            REVIEW OF SYSTEMS  GENERAL HEALTH: feels well otherwise, denied any fevers chills or night sweats   RESPIRATORY: denies shortness of breath   CARDIOVASCULAR: denies chest pain  GI: denies abdominal pain    EXAM  LMP 04/04/2024 (Approximate)   EXAM  Reviewed most recent set of vitals   Physical Exam:  alert, appears stated age and cooperative, Normocephalic, without obvious abnormality, atraumatic, lips, mucosa, and tongue normal; teeth and gums normal, Speaking in full sentences comfortably, Normal work of breathing, Skin color, texture, turgor normal. No rashes or lesions and age appropriate, normal, logical connections and person, place and time/date      Lab Results   Component Value Date    WBC 8.7 04/13/2024    RBC 4.90 04/13/2024    HGB 15.0 04/13/2024    HCT 44.8 04/13/2024    MCV 91.4 04/13/2024    MCH 30.6 04/13/2024    MCHC 33.5 04/13/2024    RDW 13.2 04/13/2024    .0 04/13/2024    MPV 11.1 10/20/2012     Lab Results   Component Value Date    GLU 72 04/13/2024    BUN 14 04/13/2024    BUNCREA 10.8 12/01/2022    CREATSERUM 0.90 04/13/2024     ANIONGAP 5 04/13/2024    GFR 74 01/27/2018    GFRNAA 97 09/11/2021    GFRAA 112 09/11/2021    CA 8.9 04/13/2024    OSMOCALC 283 04/13/2024    ALKPHO 43 04/13/2024    AST 10 (L) 04/13/2024    ALT 26 04/13/2024    BILT 0.8 04/13/2024    TP 7.2 04/13/2024    ALB 3.7 04/13/2024    GLOBULIN 3.5 04/13/2024    AGRATIO 1.7 01/17/2015     04/13/2024    K 3.6 04/13/2024     04/13/2024    CO2 26.0 04/13/2024     Lab Results   Component Value Date    EAG 85 08/31/2020    A1C 4.6 08/31/2020     Lab Results   Component Value Date    CHOLEST 179 04/13/2024    TRIG 100 04/13/2024    HDL 67 (H) 04/13/2024    LDL 94 04/13/2024    VLDL 16 04/13/2024    TCHDLRATIO 2.74 01/27/2018    NONHDLC 112 04/13/2024     Lab Results   Component Value Date    T4F 0.7 (L) 04/13/2024    TSH >100.000 (H) 04/13/2024    TSHT4 0.04 (L) 10/21/2015     Lab Results   Component Value Date    B12 389 10/18/2023     Lab Results   Component Value Date    VITD 33.0 10/18/2023       Current Outpatient Medications on File Prior to Visit   Medication Sig Dispense Refill    predniSONE 10 MG Oral Tab Take 3 tabs(30 mg) with food once a day x 10  days 30 tablet 0    DUPIXENT 300 MG/2ML Subcutaneous Solution Pen-injector INJECT 1 PEN UNDER THE SKIN (SUBCUTANEOUS INJECTION) EVERY 14 DAYS. 4 each 0    doxycycline 100 MG Oral Cap Take 1 capsule (100 mg total) by mouth 2 (two) times daily for 20 days. 40 capsule 0    Tretinoin 0.05 % External Cream APPLY TOPICALLY TO AFFECTED AREA AT BEDTIME AS NEEDED 45 g 1    levothyroxine 175 MCG Oral Tab Take 1 tablet (175 mcg total) by mouth before breakfast. 90 tablet 0    cetirizine 10 MG Oral Tab Take 1 tablet by mouth once daily 90 tablet 0    predniSONE 10 MG Oral Tab Take 40mg q day x 5 days,then 30mg x 3 days,then 20 mg x 3 days,then10 mg x 3 days with food 38 tablet 0    buPROPion  MG Oral Tablet 12 Hr TAKE 1 TABLET(150 MG) BY MOUTH TWICE DAILY 180 tablet 0    topiramate 100 MG Oral Tab Take 1 tablet (100  mg total) by mouth 2 (two) times daily. 180 tablet 0    Levonorgestrel-Ethinyl Estrad (AVIANE) 0.1-20 MG-MCG Oral Tab Take 1 tablet by mouth daily. 84 tablet 3    fluticasone-umeclidin-vilant (TRELEGY ELLIPTA) 200-62.5-25 MCG/ACT Inhalation Aerosol Powder, Breath Activated Inhale 1 puff into the lungs daily. 3 each 1    Insulin Pen Needle (PEN NEEDLES) 32G X 4 MM Does not apply Misc Inject 1 each into the skin daily. 100 each 1    albuterol 108 (90 Base) MCG/ACT Inhalation Aero Soln Inhale 2 puffs into the lungs every 4 (four) hours as needed for Wheezing. 3 each 1    triamcinolone 0.1 % External Cream Apply topically 2 (two) times daily as needed. 60 g 3    albuterol sulfate (2.5 MG/3ML) 0.083% Inhalation Nebu Soln Take 3 mL (2.5 mg total) by nebulization every 4 (four) hours as needed for Wheezing. 1 Box 1     No current facility-administered medications on file prior to visit.       ASSESSMENT  Analyzed weight data:       Diagnoses and all orders for this visit:    Encounter for therapeutic drug monitoring    BREDNAN (obstructive sleep apnea)    Dyslipidemia    Other orders  -     semaglutide-weight management 2.4 MG/0.75ML Subcutaneous Solution Auto-injector; Inject 0.75 mL (2.4 mg total) into the skin once a week for 16 doses.              PLAN  Initial consult: 8/2/16 with [facility]   Weight max 4/3/19 on 179 lb      Continue  wegovy   -advised of side effects and adverse effects of this medication  Injection teaching in OV   Failed phentermine   -advised of side effects and adverse effects of this medication  Tried saxenda in the past : No further weight loss  Cannot take contrave on wellbutrin     Continue topamax and wellbutrin xl 150   Needs to track nutrition, increase vegetables / protein especially     We reviewed limitations of medication management without lifestyle adjustment   Nutrition: low carb diet/ recommended to eat breakfast daily/ regular protein intake  Medication use and side effects reviewed with  patient.  Medication contraindications:phendimetrazine   Follow up with dietitian and psychologist as recommended.  Discussed the role of sleep and stress in weight management.  Counseled on comprehensive weight loss plan including attention to nutrition, exercise and behavior/stress management for success. See patient instruction below for more details.  Discussed strategies to overcome barriers to successful weight loss and weight maintenance  FITTE: ACSM recommendations (150-300 minutes/ week in active weight loss)    There are no Patient Instructions on file for this visit.    Return in about 3 months (around 12/23/2024).    Patient verbalizes understanding.    Analilia Preston MD

## 2024-10-04 ENCOUNTER — PATIENT MESSAGE (OUTPATIENT)
Dept: FAMILY MEDICINE CLINIC | Facility: CLINIC | Age: 49
End: 2024-10-04

## 2024-10-07 NOTE — TELEPHONE ENCOUNTER
From: Jamila Vivar  To: KIRA DURAN  Sent: 10/4/2024 4:34 PM CDT  Subject: Prescription     Hi, I think I had a epi pen filled once and I never did again because it  and it cost a lot at the time. Can I have one again because my opx is met this year. I would use it for bee stings which I am allergic to.     Thanks  Jamila Vivar

## 2024-10-08 RX ORDER — BUPROPION HYDROCHLORIDE 150 MG/1
TABLET, EXTENDED RELEASE ORAL
Qty: 120 TABLET | Refills: 0 | Status: SHIPPED | OUTPATIENT
Start: 2024-10-08

## 2024-10-08 NOTE — TELEPHONE ENCOUNTER
Requesting   Requested Prescriptions     Pending Prescriptions Disp Refills    buPROPion  MG Oral Tablet 12 Hr [Pharmacy Med Name: BUPROPION HCL  MG TABLET] 180 tablet 0     Sig: TAKE 1 TABLET BY MOUTH TWICE A DAY       LOV: 09/23/2024  RTC: 02/13/2025  Filled: 01/02/2024 180 tablets with 0 refills    Future Appointments   Date Time Provider Department Center   10/21/2024  6:00 PM [facility]   2/13/2025  1:00 PM Analilia Preston MD [facility]

## 2024-10-14 DIAGNOSIS — L57.0 ACTINIC KERATOSIS: ICD-10-CM

## 2024-10-15 RX ORDER — TRETINOIN 0.5 MG/G
CREAM TOPICAL
Qty: 45 G | Refills: 1 | Status: SHIPPED | OUTPATIENT
Start: 2024-10-15

## 2024-10-15 NOTE — TELEPHONE ENCOUNTER
Did not pass protocol  Last office visit 11/6/2023  Last refill was: , 8/22/2024__tabs  Next appointment: none scheduled     Please sign pended medication if appropriate     Medication Quantity Refills Start End   Tretinoin 0.05 % External Cream 45 g 1 8/22/2024 --   Sig:   APPLY TOPICALLY TO AFFECTED AREA AT BEDTIME AS NEEDED       Please call patient to schedule medication check  then route to Dr. Macias

## 2024-10-17 ENCOUNTER — OFFICE VISIT (OUTPATIENT)
Dept: FAMILY MEDICINE CLINIC | Facility: CLINIC | Age: 49
End: 2024-10-17
Payer: COMMERCIAL

## 2024-10-17 VITALS
SYSTOLIC BLOOD PRESSURE: 110 MMHG | HEART RATE: 90 BPM | RESPIRATION RATE: 16 BRPM | HEIGHT: 64 IN | TEMPERATURE: 98 F | WEIGHT: 142 LBS | DIASTOLIC BLOOD PRESSURE: 60 MMHG | BODY MASS INDEX: 24.24 KG/M2

## 2024-10-17 DIAGNOSIS — G89.29 CHRONIC BILATERAL LOW BACK PAIN WITHOUT SCIATICA: ICD-10-CM

## 2024-10-17 DIAGNOSIS — Z91.030 BEE STING ALLERGY: ICD-10-CM

## 2024-10-17 DIAGNOSIS — M54.50 CHRONIC BILATERAL LOW BACK PAIN WITHOUT SCIATICA: ICD-10-CM

## 2024-10-17 DIAGNOSIS — E03.9 ACQUIRED HYPOTHYROIDISM: ICD-10-CM

## 2024-10-17 DIAGNOSIS — J45.40 MODERATE PERSISTENT ASTHMA WITHOUT COMPLICATION (HCC): Primary | ICD-10-CM

## 2024-10-17 PROCEDURE — 3008F BODY MASS INDEX DOCD: CPT | Performed by: FAMILY MEDICINE

## 2024-10-17 PROCEDURE — 99214 OFFICE O/P EST MOD 30 MIN: CPT | Performed by: FAMILY MEDICINE

## 2024-10-17 PROCEDURE — G2211 COMPLEX E/M VISIT ADD ON: HCPCS | Performed by: FAMILY MEDICINE

## 2024-10-17 PROCEDURE — 3074F SYST BP LT 130 MM HG: CPT | Performed by: FAMILY MEDICINE

## 2024-10-17 PROCEDURE — 3078F DIAST BP <80 MM HG: CPT | Performed by: FAMILY MEDICINE

## 2024-10-17 RX ORDER — EPINEPHRINE 0.3 MG/.3ML
0.3 INJECTION SUBCUTANEOUS AS NEEDED
Qty: 1 EACH | Refills: 0 | Status: SHIPPED | OUTPATIENT
Start: 2024-10-17 | End: 2025-10-17

## 2024-10-18 NOTE — PROGRESS NOTES
[facility] Family Medicine Office Note  Chief Complaint:   Chief Complaint   Patient presents with    Medication Follow-Up       HPI:   This is a 48 year old female coming in for:    Hypothyroidism - Pt had a history of hypothyroidism and here to recheck. Has been tolerating the medication well. Last TSH was earlier this year, needs repeat. Denies any shakiness, palpitations, increased BM's or wgt loss. Denies any fatigue, wgt gain.  Asthma - The patient presents for recheck of asthma sx's. Lately the patient's asthma has been  under good control. When symptoms occur they are described as wheezing, non-productive cough, and chest tightness. Patient has been using her inhaler albuterol PRN but not using trelegy. Pt has been admitted to the ER or hospital for asthma in the past. Pt has been on steroids for asthma attacks in the past. There is a family hx of asthma. When exercising there is no wheezing.  Low back pain - The patient complaints of back pain.  Pain is located at low back, mid back. Pain is described as dull, aching. Severity is mild. The pain radiates to no radiation of pain. Pain was precipitated by unknown. Has had for many months.  Pain is worsened by bending, twisting. Gets relief of back pain with nothing provides relief, no relief with PT . Prior back pain hx: recurrent self limited episodes of low back pain in the past.  Bee sting allergy - no current symptoms or recent allergic reaction.  Would like refill of epipen.      Past Medical History:    Brain tumor (benign) (HCC)    Chronic rhinitis    Extrinsic asthma, unspecified    Hypothyroid    Hypothyroidism    Nasal polyps     Past Surgical History:   Procedure Laterality Date    D & c  2003    Cervical Stump    Other surgical history  5/2012    NASAL POLYP DR GIBBS    Other surgical history  09/21/2021    uterine ablasion    Other surgical history Right 10/27/2021    Lasik redo     Social History:  Social History     Socioeconomic  History    Marital status:    Tobacco Use    Smoking status: Never    Smokeless tobacco: Never   Vaping Use    Vaping status: Never Used   Substance and Sexual Activity    Alcohol use: Not Currently     Alcohol/week: 1.0 standard drink of alcohol     Types: 1 Glasses of wine per week     Comment: 1-2 drinks/wk    Drug use: No    Sexual activity: Yes     Partners: Male     Birth control/protection: OCP   Other Topics Concern    Caffeine Concern Yes     Comment: 1-2 cokes/day    Exercise Yes     Comment: 2xweek cardio    Seat Belt Yes     Family History:  Family History   Problem Relation Age of Onset    Cancer Mother         Brain    Cancer Maternal Grandmother         stomach cancer?    Diabetes Maternal Grandmother     Diabetes Maternal Grandfather     Heart Disorder Maternal Grandfather     Cancer Maternal Grandfather         unknown cancer    Suicide History Father     No Known Problems Sister      Allergies:  Allergies   Allergen Reactions    Aspirin Tightness in Throat    Levaquin [Levofloxacin Hemihydrate] RASH    Nsaids     Penicillins      As a Child     Current Meds:  Current Outpatient Medications   Medication Sig Dispense Refill    EPINEPHrine (EPIPEN 2-STEPHANIE) 0.3 MG/0.3ML Injection Solution Auto-injector Inject 0.3 mL (1 each total) as directed as needed. 1 each 0    predniSONE 10 MG Oral Tab Take 3 tabs(30 mg) with food once a day x 10  days 30 tablet 0    TRETINOIN 0.05 % External Cream APPLY TOPICALLY TO AFFECTED AREA AT BEDTIME AS NEEDED 45 g 1    buPROPion  MG Oral Tablet 12 Hr TAKE 1 TABLET BY MOUTH TWICE A  tablet 0    semaglutide-weight management 2.4 MG/0.75ML Subcutaneous Solution Auto-injector Inject 0.75 mL (2.4 mg total) into the skin once a week for 16 doses. 3 mL 3    DUPIXENT 300 MG/2ML Subcutaneous Solution Pen-injector INJECT 1 PEN UNDER THE SKIN (SUBCUTANEOUS INJECTION) EVERY 14 DAYS. 4 each 0    levothyroxine 175 MCG Oral Tab Take 1 tablet (175 mcg total) by mouth  before breakfast. 90 tablet 0    cetirizine 10 MG Oral Tab Take 1 tablet by mouth once daily 90 tablet 0    predniSONE 10 MG Oral Tab Take 40mg q day x 5 days,then 30mg x 3 days,then 20 mg x 3 days,then10 mg x 3 days with food 38 tablet 0    topiramate 100 MG Oral Tab Take 1 tablet (100 mg total) by mouth 2 (two) times daily. 180 tablet 0    Levonorgestrel-Ethinyl Estrad (AVIANE) 0.1-20 MG-MCG Oral Tab Take 1 tablet by mouth daily. 84 tablet 3    fluticasone-umeclidin-vilant (TRELEGY ELLIPTA) 200-62.5-25 MCG/ACT Inhalation Aerosol Powder, Breath Activated Inhale 1 puff into the lungs daily. 3 each 1    Insulin Pen Needle (PEN NEEDLES) 32G X 4 MM Does not apply Misc Inject 1 each into the skin daily. 100 each 1    albuterol 108 (90 Base) MCG/ACT Inhalation Aero Soln Inhale 2 puffs into the lungs every 4 (four) hours as needed for Wheezing. 3 each 1    triamcinolone 0.1 % External Cream Apply topically 2 (two) times daily as needed. 60 g 3    albuterol sulfate (2.5 MG/3ML) 0.083% Inhalation Nebu Soln Take 3 mL (2.5 mg total) by nebulization every 4 (four) hours as needed for Wheezing. 1 Box 1      Counseling given: Not Answered       REVIEW OF SYSTEMS:   ROS:  CONSTITUTIONAL:  Denies any unusual weight gain/loss, fever, chills, weakness or fatigue.  CARDIOVASCULAR:  Denies chest pain, denies chest pressure or chest discomfort. No palpitations or edema.  Denies any dyspnea on exertion or at rest  RESPIRATORY:  Denies shortness of breath, cough  GASTROINTESTINAL:  Denies any abdominal pain, nausea, vomiting  NEUROLOGICAL:  Denies headache, dizziness, syncope, numbness or tingling in the extremities.  MUSCULOSKELETAL:  + low back pain  HEMATOLOGIC:  Denies anemia  ALLERGIES:  + asthma    EXAM:   /60   Pulse 90   Temp 97.6 °F (36.4 °C) (Temporal)   Resp 16   Ht 5' 4\" (1.626 m)   Wt 142 lb (64.4 kg)   LMP 10/04/2024 (Approximate)   BMI 24.37 kg/m²  Estimated body mass index is 24.37 kg/m² as calculated from  the following:    Height as of this encounter: 5' 4\" (1.626 m).    Weight as of this encounter: 142 lb (64.4 kg).   Vital signs reviewed.Appears stated age, well groomed.  Physical Exam:  GEN:  Patient is alert and oriented x3, no apparent distress  HEAD:  Normocephalic, atraumatic  NECK:  Supple, no LAD  LUNGS: clear to auscultation bilaterally, no rales/rhonchi/wheezing  HEART:  Regular rate and rhythm, no murmurs, rubs or gallops  ABDOMEN:  Soft, nondistended, nontender, bowel sounds normal in all 4 quadrants, no hepatosplenomegaly  EXTREMITIES:  Strength intact with 5/5 bilaterally upper and lower extremities, no edema noted  NEURO:  CN 2 - 12 grossly intact     ASSESSMENT AND PLAN:   1. Acquired hypothyroidism  -  recheck TSH  -  adjust synthroid accordingly    2. Moderate persistent asthma without complication  -  controlled  -  continue trelegy  -  continue albuterol PRN    3. Chronic bilateral low back pain without sciatica  -  persistent  -  start PT  -  f/u if no improvement    4. Bee sting allergy  -  epipen refilled      Meds & Refills for this Visit:  Requested Prescriptions     Signed Prescriptions Disp Refills    EPINEPHrine (EPIPEN 2-STEPHANIE) 0.3 MG/0.3ML Injection Solution Auto-injector 1 each 0     Sig: Inject 0.3 mL (1 each total) as directed as needed.       Health Maintenance:  Health Maintenance Due   Topic Date Due    Colorectal Cancer Screening  Never done    Asthma Action Plan  Never done    Asthma Control Test  06/04/2019    Annual Depression Screening  01/01/2023    Mammogram  07/05/2023    COVID-19 Vaccine (4 - 2023-24 season) 09/01/2023    Influenza Vaccine (1) 10/01/2023       Patient/Caregiver Education: Patient/Caregiver Education: There are no barriers to learning. Medical education done.   Outcome: Patient verbalizes understanding. Patient is notified to call with any questions, complications, allergies, or worsening or changing symptoms.  Patient is to call with any side effects or  complications from the treatments as a result of today.     Problem List:  Patient Active Problem List   Diagnosis    Nontoxic multinodular goiter    Nontoxic uninodular goiter    Allergic rhinitis, cause unspecified    Herpes simplex without mention of complication    Abnormal weight gain    Hypothyroidism    Unspecified asthma(493.90)    Sinusitis    Overweight    Acquired hypothyroidism    Overweight (BMI 25.0-29.9)    Encounter for therapeutic drug monitoring    Fatigue    Heterozygous MTHFR mutation [id number]    Low vitamin B12 level    Vitamin D deficiency    Extrinsic asthma (HCC)    ASCUS with positive high risk HPV cervical ( positive 18/45 genotype 04/03/2019 )    DUB (dysfunctional uterine bleeding)       KIRA DURAN, DO    Please note that portions of this note may have been completed with a voice recognition program. Efforts were made to edit the dictations but occasionally words are mis-transcribed.

## 2024-10-21 ENCOUNTER — TELEMEDICINE (OUTPATIENT)
Dept: ALLERGY | Facility: CLINIC | Age: 49
End: 2024-10-21
Payer: COMMERCIAL

## 2024-10-21 ENCOUNTER — TELEPHONE (OUTPATIENT)
Dept: ALLERGY | Facility: CLINIC | Age: 49
End: 2024-10-21

## 2024-10-21 DIAGNOSIS — J30.2 SEASONAL AND PERENNIAL ALLERGIC RHINOCONJUNCTIVITIS: ICD-10-CM

## 2024-10-21 DIAGNOSIS — J33.9 NASAL POLYP: ICD-10-CM

## 2024-10-21 DIAGNOSIS — J32.2 CHRONIC ETHMOIDAL SINUSITIS: Primary | ICD-10-CM

## 2024-10-21 DIAGNOSIS — J45.40 MODERATE PERSISTENT EXTRINSIC ASTHMA WITHOUT COMPLICATION (HCC): ICD-10-CM

## 2024-10-21 DIAGNOSIS — J30.89 SEASONAL AND PERENNIAL ALLERGIC RHINOCONJUNCTIVITIS: ICD-10-CM

## 2024-10-21 DIAGNOSIS — Z88.6 ALLERGY TO NONSTEROIDAL ANTI-INFLAMMATORY DRUG (NSAID): ICD-10-CM

## 2024-10-21 DIAGNOSIS — Z88.0 PENICILLIN ALLERGY: ICD-10-CM

## 2024-10-21 DIAGNOSIS — H10.10 SEASONAL AND PERENNIAL ALLERGIC RHINOCONJUNCTIVITIS: ICD-10-CM

## 2024-10-21 NOTE — TELEPHONE ENCOUNTER
Dr. Ceballos please see message from the phone room listed below  Patient to be seen in person today at 6 pm but is requesting it to be changed to a video visit  Currently has [facility] HMO insurance   Please advise

## 2024-10-21 NOTE — TELEPHONE ENCOUNTER
Patient asking for her 6p appointment for today with Dr Ivan Ceballos be changed to [email or web address] virtual visit.  Call to confirm it was changed at:  [phone]

## 2024-10-21 NOTE — PATIENT INSTRUCTIONS
chronic sinusitis with nasal polyps  Overall much improved since starting Dupixent.  Denies recurrent sinus infection since starting Dupixent sense of smell is improved.  No nasal polyps since starting Dupixent  Continue with Dupixent  Denies adverse events with Dupixent in the interim      2.  Asthma  Currently not needing Trelegy.  Denies persistent asthma symptoms more than 2 days/week.  pt on Dupixent for underlying chronic sinusitis with nasal polyposis  Albuterol 2 puffs every 4-6 hours as needed  Reviewed signs and symptoms of persistent asthma including the rules of 2  Restart Trelegy if having persistent symptoms 1 puff once a day.    3.  Allergic rhinitis  Reviewed avoidance measures and potential treatment option immunotherapy  Continue with current medications including Xyzal.  Or Zyrtec  May add Flonase or Nasacort 2 sprays per nostril once a day refractory    4.  NSAID allergy continue to avoid NSAIDs  May consider Tylenol or potentially oral challenge to Celebrex    5.  Penicillin allergy  Reviewed serum IgE testing to penicillin to further evaluate for an IgE mediated allergy  Reviewed 80% chance of outgrowing within 10 years of the previous reaction  Check serum IgE to penicillin.  Will call with results.       Orders This Visit:  Orders Placed This Encounter   Procedures    Penicillin V    Penicillin G

## 2024-10-21 NOTE — PROGRESS NOTES
Jamila Vivar is a 48 year old female.    HPI:   No chief complaint on file.    Patient is a 48-year-old female who presents for follow-up via video visit    This visit is conducted using Telemedicine with live, interactive video and audio during this Coronavirus pandemic.     Please note that the following visit was completed using two-way, real-time interactive audio and/or video communication.  This has been done in good lilliam to provide continuity of care in the best interest of the provider-patient relationship, due to the ongoing public health crisis/national emergency and because of restrictions of visitation.  There are limitations of this visit as no physical exam could be performed.  Every conscious effort was taken to allow for sufficient and adequate time.  This billing was spent on reviewing labs, medications, radiology tests and decision making.  Appropriate medical decision-making and tests are ordered as detailed in the plan of care below     Patient last seen by me on October 7, 2023  History of persistent asthma chronic sinusitis with nasal polyps and allergic rhinitis    Medication list include Dupixent Zyrtec albuterol Synthroid Trelegy 200    Immunizations reviewed.  COVID-vaccine x 3 doses last in November 2021 on record  Last flu vaccine from February 2022  Prior pneumonia vaccine and November 2023    Today patient reports    Chronic sinusitis with nasal polyps  Denies recurrent sinus infections or antibiotics over the past year  No current fevers or mucopurulence.  Antibiotics and steroids once over the past year in September 2024   Dupixant at home     Asthma  Active or persistent symptoms more than 2 days/week  ED visits or prednisone in the interim denies  Active meds: albuterol  Off trelegy   Also on Dupixent for underlying chronic sinusitis with nasal polyps    Ar:  Active or persistent symptoms: denies   Active meds: see med list   pets :  none       Still avoiding penicillin and  NSAIDs    HISTORY:  Past Medical History:    Brain tumor (benign) (HCC)    Chronic rhinitis    Extrinsic asthma, unspecified    Hypothyroid    Hypothyroidism    Nasal polyps      Past Surgical History:   Procedure Laterality Date    D & c  2003    Cervical Stump    Other surgical history  5/2012    NASAL POLYP DR GIBBS    Other surgical history  09/21/2021    uterine ablasion    Other surgical history Right 10/27/2021    Lasik redo      Family History   Problem Relation Age of Onset    Cancer Mother         Brain    Cancer Maternal Grandmother         stomach cancer?    Diabetes Maternal Grandmother     Diabetes Maternal Grandfather     Heart Disorder Maternal Grandfather     Cancer Maternal Grandfather         unknown cancer    Suicide History Father     No Known Problems Sister       Social History:   Social History     Socioeconomic History    Marital status:    Tobacco Use    Smoking status: Never    Smokeless tobacco: Never   Vaping Use    Vaping status: Never Used   Substance and Sexual Activity    Alcohol use: Not Currently     Alcohol/week: 1.0 standard drink of alcohol     Types: 1 Glasses of wine per week     Comment: 1-2 drinks/wk    Drug use: No    Sexual activity: Yes     Partners: Male     Birth control/protection: OCP   Other Topics Concern    Caffeine Concern Yes     Comment: 1-2 cokes/day    Exercise Yes     Comment: 2xweek cardio    Seat Belt Yes        Medications (Active prior to today's visit):  Current Outpatient Medications   Medication Sig Dispense Refill    predniSONE 10 MG Oral Tab Take 3 tabs(30 mg) with food once a day x 10  days 30 tablet 0    EPINEPHrine (EPIPEN 2-STEPHANIE) 0.3 MG/0.3ML Injection Solution Auto-injector Inject 0.3 mL (1 each total) as directed as needed. 1 each 0    TRETINOIN 0.05 % External Cream APPLY TOPICALLY TO AFFECTED AREA AT BEDTIME AS NEEDED 45 g 1    buPROPion  MG Oral Tablet 12 Hr TAKE 1 TABLET BY MOUTH TWICE A  tablet 0    semaglutide-weight  management 2.4 MG/0.75ML Subcutaneous Solution Auto-injector Inject 0.75 mL (2.4 mg total) into the skin once a week for 16 doses. 3 mL 3    DUPIXENT 300 MG/2ML Subcutaneous Solution Pen-injector INJECT 1 PEN UNDER THE SKIN (SUBCUTANEOUS INJECTION) EVERY 14 DAYS. 4 each 0    levothyroxine 175 MCG Oral Tab Take 1 tablet (175 mcg total) by mouth before breakfast. 90 tablet 0    cetirizine 10 MG Oral Tab Take 1 tablet by mouth once daily 90 tablet 0    predniSONE 10 MG Oral Tab Take 40mg q day x 5 days,then 30mg x 3 days,then 20 mg x 3 days,then10 mg x 3 days with food 38 tablet 0    topiramate 100 MG Oral Tab Take 1 tablet (100 mg total) by mouth 2 (two) times daily. 180 tablet 0    Levonorgestrel-Ethinyl Estrad (AVIANE) 0.1-20 MG-MCG Oral Tab Take 1 tablet by mouth daily. 84 tablet 3    fluticasone-umeclidin-vilant (TRELEGY ELLIPTA) 200-62.5-25 MCG/ACT Inhalation Aerosol Powder, Breath Activated Inhale 1 puff into the lungs daily. 3 each 1    Insulin Pen Needle (PEN NEEDLES) 32G X 4 MM Does not apply Misc Inject 1 each into the skin daily. 100 each 1    albuterol 108 (90 Base) MCG/ACT Inhalation Aero Soln Inhale 2 puffs into the lungs every 4 (four) hours as needed for Wheezing. 3 each 1    triamcinolone 0.1 % External Cream Apply topically 2 (two) times daily as needed. 60 g 3    albuterol sulfate (2.5 MG/3ML) 0.083% Inhalation Nebu Soln Take 3 mL (2.5 mg total) by nebulization every 4 (four) hours as needed for Wheezing. 1 Box 1       Allergies:  Allergies[1]      ROS:   Allergic/Immuno:  See hpi  Cardiovascular:  Negative for irregular heartbeat/palpitations, chest pain, edema  Constitutional:  Negative night sweats,weight loss, irritability and lethargy  ENMT:  Negative for ear drainage, hearing loss and nasal drainage  Eyes:  Negative for eye discharge and vision loss  Gastrointestinal:  Negative for abdominal pain, diarrhea and vomiting  Integumentary:  Negative for pruritus and rash  Respiratory:  Negative for  cough, dyspnea and wheezing    PHYSICAL EXAM:   Constitutional: responsive, no acute distress noted  Head/Face: NC/Atraumatic  Speaking in full sentences no increased work of breathing  A&O x 3 #       ASSESSMENT/PLAN:   Assessment   Encounter Diagnoses   Name Primary?    Chronic ethmoidal sinusitis Yes    Nasal polyp     Moderate persistent extrinsic asthma without complication (HCC)     Seasonal and perennial allergic rhinoconjunctivitis     Allergy to nonsteroidal anti-inflammatory drug (NSAID)     Penicillin allergy         chronic sinusitis with nasal polyps  Overall much improved since starting Dupixent.  Denies recurrent sinus infection since starting Dupixent sense of smell is improved.  No nasal polyps since starting Dupixent  Continue with Dupixent  Denies adverse events with Dupixent in the interim      2.  Asthma  Currently not needing Trelegy.  Denies persistent asthma symptoms more than 2 days/week.  pt on Dupixent for underlying chronic sinusitis with nasal polyposis  Albuterol 2 puffs every 4-6 hours as needed  Reviewed signs and symptoms of persistent asthma including the rules of 2  Restart Trelegy if having persistent symptoms 1 puff once a day.    3.  Allergic rhinitis  Reviewed avoidance measures and potential treatment option immunotherapy  Continue with current medications including Xyzal.  Or Zyrtec  May add Flonase or Nasacort 2 sprays per nostril once a day refractory    4.  NSAID allergy continue to avoid NSAIDs  May consider Tylenol or potentially oral challenge to Celebrex    5.  Penicillin allergy  Reviewed serum IgE testing to penicillin to further evaluate for an IgE mediated allergy  Reviewed 80% chance of outgrowing within 10 years of the previous reaction  Check serum IgE to penicillin.  Will call with results.       Orders This Visit:  Orders Placed This Encounter   Procedures    Penicillin V    Penicillin G       Meds This Visit:  Requested Prescriptions      No prescriptions  requested or ordered in this encounter       Imaging & Referrals:  None     10/21/2024  Ivan Ceballos MD    If medication samples were provided today, they were provided solely for patient education and training related to self administration of these medications.  Teaching, instruction and sample was provided to the patient by myself.  Teaching included  a review of potential adverse side effects as well as potential efficacy.  Patient's questions were answered in regards to medication administration and dosing and potential side effects. Teaching was provided via the teach back method           [1]   Allergies  Allergen Reactions    Aspirin Tightness in Throat    Levaquin [Levofloxacin Hemihydrate] RASH    Nsaids     Penicillins      As a Child

## 2024-10-31 ENCOUNTER — MED REC SCAN ONLY (OUTPATIENT)
Facility: CLINIC | Age: 49
End: 2024-10-31

## 2024-11-14 DIAGNOSIS — L57.0 ACTINIC KERATOSIS: ICD-10-CM

## 2024-11-14 RX ORDER — TRETINOIN 0.5 MG/G
CREAM TOPICAL
Qty: 45 G | Refills: 0 | OUTPATIENT
Start: 2024-11-14

## 2024-11-14 NOTE — TELEPHONE ENCOUNTER
Medication sent on 10/15/24 for 45 g with one refill, too soon to refill medication.    [FreeTextEntry1] : Known sigmoid colon diverticulosis and clinical picture of acute sigmoid colon diverticulitis.  Abdominal sonogram supports diagnosis.  No blood work or imaging currently available.  Patient already started on antibiotics 2 days ago and making significant improvement.  Currently only on Cipro at standard dose of 500 mg p.o. twice daily.\par Advised to continue with antibiotics for 7 to 10 days total.\par Currently on clear liquid diet.  Okay to advance to low residue soft diet for the next 1.5 weeks.  As long as symptoms generally improved, then advance to high-fiber diet in about 1.5 weeks.\par There is little clinical utility in pursuing blood work or imaging at this juncture.  We will hold on both currently.\par \par Positive family history for 2 second-degree relatives with colon cancer i.e. aunts.  Last screening colonoscopy was 7 years ago.  Not currently appropriate for repeat colonoscopy due to active sigmoid colon diverticulitis.\par GI office reevaluate here in 2 months.  We will consider scheduling elective screening colonoscopy for spring, 2023.

## 2024-12-07 ENCOUNTER — LAB ENCOUNTER (OUTPATIENT)
Dept: LAB | Age: 49
End: 2024-12-07
Attending: ALLERGY & IMMUNOLOGY
Payer: COMMERCIAL

## 2024-12-07 DIAGNOSIS — Z88.0 PENICILLIN ALLERGY: ICD-10-CM

## 2024-12-07 DIAGNOSIS — E03.9 ACQUIRED HYPOTHYROIDISM: ICD-10-CM

## 2024-12-07 LAB
T4 FREE SERPL-MCNC: 1.2 NG/DL (ref 0.8–1.7)
TSI SER-ACNC: 0.41 UIU/ML (ref 0.55–4.78)

## 2024-12-07 PROCEDURE — 86003 ALLG SPEC IGE CRUDE XTRC EA: CPT

## 2024-12-07 PROCEDURE — 36415 COLL VENOUS BLD VENIPUNCTURE: CPT

## 2024-12-07 PROCEDURE — 84439 ASSAY OF FREE THYROXINE: CPT

## 2024-12-07 PROCEDURE — 84443 ASSAY THYROID STIM HORMONE: CPT

## 2024-12-09 DIAGNOSIS — E03.9 HYPOTHYROIDISM, UNSPECIFIED TYPE: Primary | ICD-10-CM

## 2024-12-09 RX ORDER — LEVOTHYROXINE SODIUM 150 UG/1
150 TABLET ORAL
Qty: 90 TABLET | Refills: 0 | Status: SHIPPED | OUTPATIENT
Start: 2024-12-09

## 2024-12-10 DIAGNOSIS — J33.9 NASAL POLYP: ICD-10-CM

## 2024-12-10 RX ORDER — DOXYCYCLINE 100 MG/1
100 CAPSULE ORAL 2 TIMES DAILY
Qty: 40 CAPSULE | Refills: 0 | Status: SHIPPED | OUTPATIENT
Start: 2024-12-10 | End: 2024-12-30

## 2024-12-10 RX ORDER — PREDNISONE 10 MG/1
TABLET ORAL
Qty: 30 TABLET | Refills: 0 | Status: SHIPPED | OUTPATIENT
Start: 2024-12-10

## 2024-12-10 RX ORDER — TOPIRAMATE 100 MG/1
100 TABLET, FILM COATED ORAL 2 TIMES DAILY
Qty: 180 TABLET | Refills: 0 | Status: SHIPPED | OUTPATIENT
Start: 2024-12-10 | End: 2025-03-10

## 2024-12-10 RX ORDER — BUPROPION HYDROCHLORIDE 150 MG/1
TABLET, EXTENDED RELEASE ORAL
Qty: 120 TABLET | Refills: 0 | Status: SHIPPED | OUTPATIENT
Start: 2024-12-10

## 2024-12-10 RX ORDER — DUPILUMAB 300 MG/2ML
300 INJECTION, SOLUTION SUBCUTANEOUS
Qty: 4 ML | Refills: 2 | Status: SHIPPED | OUTPATIENT
Start: 2024-12-10

## 2024-12-10 NOTE — TELEPHONE ENCOUNTER
Spoke with patient. Verified name and .  Informed patient our office received a refill request for Prednisone and antibiotic. Patient states she has a sinus infection which started about 3 days ago, low grade fever of 99 with yellow nasal congestion. Patient denies sinus pressure, denies cough or wheezing.     Informed patient will forward this request to Dr. Ceballos. Patient verbalizes understanding.    Last office visit was 10/21/24

## 2024-12-10 NOTE — TELEPHONE ENCOUNTER
Requesting   Requested Prescriptions     Pending Prescriptions Disp Refills    TOPIRAMATE 100 MG Oral Tab [Pharmacy Med Name: TOPIRAMATE 100 MG TABLET] 180 tablet 0     Sig: TAKE 1 TABLET BY MOUTH TWICE A DAY    BUPROPION  MG Oral Tablet 12 Hr [Pharmacy Med Name: BUPROPION HCL  MG TABLET] 120 tablet 0     Sig: TAKE 1 TABLET BY MOUTH TWICE A DAY       LOV: 04/25/24  RTC: 02/13/25  Filled: 10/08/24 #120 with 0 refills- Bupropion   01/02/24 180 tablets with 0 refills- topiramate     Future Appointments   Date Time Provider Department Center   2/13/2025  1:00 PM Analilia Preston MD [facility]

## 2024-12-10 NOTE — TELEPHONE ENCOUNTER
Patient last seen in Allergy 10/21/2024 for . . .    Chronic ethmoidal sinusitis Yes     Nasal polyp      Moderate persistent extrinsic asthma without complication (HCC)      Seasonal and perennial allergic rhinoconjunctivitis      Allergy to nonsteroidal anti-inflammatory drug (NSAID)      Penicillin allergy        Requested Prescriptions   Pending Prescriptions Disp Refills    DUPIXENT 300 MG/2ML Subcutaneous Solution Auto-injector [Pharmacy Med Name: DUPIXENT 300 MG/2ML SOLUTION AUTO-INJECTOR]  0     Sig: INJECT 1 PEN UNDER THE SKIN (SUBCUTANEOUS INJECTION) EVERY 2 WEEKS       Biologic Medications Passed - 12/10/2024  9:46 AM        Passed - Appt in past 6 mos or next 3 mos     Recent Outpatient Visits              1 month ago Chronic ethmoidal sinusitis    [facility] [place] [facility]    Telemedicine    1 month ago Moderate persistent asthma without complication (HCC)    [address] [place],     Office Visit    2 months ago Encounter for therapeutic drug monitoring    [address] Analilia Preston MD    Telemedicine    7 months ago Encounter for therapeutic drug monitoring    [address] Analilia Preston MD    Office Visit    7 months ago Routine general medical examination at a health care facility    [address] [place],     Office Visit          Future Appointments         Provider Department Appt Notes    In 2 months Analilia Preston MD [address] 5 mo f/u                       Dupilumab (DUPIXENT) 300 MG/2ML Subcutaneous Solution Auto-injector 4 mL 2 12/10/2024 --    Sig - Route: Inject 300 mg into the skin every 14 (fourteen) days. - Subcutaneous    Sent to pharmacy as: Dupixent 300 MG/2ML Subcutaneous Solution Auto-injector (Dupilumab)     E-Prescribing Status: Receipt confirmed by pharmacy (12/10/2024  9:49 AM CST)      Associated Diagnoses    Nasal polyp        Pharmacy    [facility] - [place], [place] - [address] [facility] [phone], [phone]     Prescription as above sent to pharmacy per protocol.

## 2024-12-10 NOTE — TELEPHONE ENCOUNTER
Patient contacted via telephone and informed that Dr. Ceballos sent prescription for Prednisone and Doxycycline to [facility] in [place].     Patient verbalized understanding of information.

## 2024-12-13 LAB
C001-IGE PENICILLOYL G: <0.1 KU/L
C002-IGE PENICILLOYL V: <0.1 KU/L

## 2024-12-17 ENCOUNTER — TELEPHONE (OUTPATIENT)
Dept: ALLERGY | Facility: CLINIC | Age: 49
End: 2024-12-17

## 2024-12-17 NOTE — TELEPHONE ENCOUNTER
----- Message from Ivan Ceballos sent at 12/16/2024  7:42 AM CST -----  Please contact patient with negative serum IgE panel to penicillin V, penicillin G.  Recommend oral challenge to further evaluate

## 2024-12-17 NOTE — TELEPHONE ENCOUNTER
Pt contacted, confirmed name, and . Pt verbalizes understanding, and denies further questions.   Pt will call back to schedule oral challenge appointment. She understands it must be scheduled by Dr. Ceballos's nurses, and we are currently booking into April at this time.   Pt verbalizes her understanding, and is agreeable to plan of care.

## 2024-12-30 DIAGNOSIS — L57.0 ACTINIC KERATOSIS: ICD-10-CM

## 2024-12-30 DIAGNOSIS — J45.41 MODERATE PERSISTENT ASTHMA WITH EXACERBATION (HCC): ICD-10-CM

## 2025-01-02 RX ORDER — ALBUTEROL SULFATE 90 UG/1
2 INHALANT RESPIRATORY (INHALATION) EVERY 4 HOURS PRN
Qty: 3 EACH | Refills: 0 | Status: SHIPPED | OUTPATIENT
Start: 2025-01-02

## 2025-01-02 RX ORDER — TRETINOIN 0.5 MG/G
CREAM TOPICAL
Qty: 45 G | Refills: 0 | Status: SHIPPED | OUTPATIENT
Start: 2025-01-02

## 2025-01-02 NOTE — TELEPHONE ENCOUNTER
Last Office Visit: 10/17/24  Last Refill: 11/03/22- albuterol   10/15/24- tretinoin   Return to Clinic: 6 months?  Protocol: failed   NOV:  n/a   Requested Prescriptions     Pending Prescriptions Disp Refills    albuterol 108 (90 Base) MCG/ACT Inhalation Aero Soln 3 each 0     Sig: Inhale 2 puffs into the lungs every 4 (four) hours as needed for Wheezing.    Tretinoin 0.05 % External Cream 45 g 0     Sig: APPLY TOPICALLY TO AFFECTED AREA AT BEDTIME AS NEEDED         Please approve if appropriate.     Thank you!

## 2025-01-15 RX ORDER — SEMAGLUTIDE 2.4 MG/.75ML
INJECTION, SOLUTION SUBCUTANEOUS
Qty: 4 ML | Refills: 0 | OUTPATIENT
Start: 2025-01-15

## 2025-01-27 ENCOUNTER — TELEPHONE (OUTPATIENT)
Facility: CLINIC | Age: 50
End: 2025-01-27

## 2025-01-27 RX ORDER — LEVONORGESTREL AND ETHINYL ESTRADIOL 0.1-0.02MG
1 KIT ORAL DAILY
Qty: 84 TABLET | Refills: 0 | Status: SHIPPED | OUTPATIENT
Start: 2025-01-27

## 2025-01-27 RX ORDER — SEMAGLUTIDE 2.4 MG/.75ML
INJECTION, SOLUTION SUBCUTANEOUS
Qty: 4 ML | Refills: 0 | OUTPATIENT
Start: 2025-01-27

## 2025-01-27 NOTE — TELEPHONE ENCOUNTER
Spoke with patient and scheduled annual exam for 2/18/25, needed for future medication refills    Spoke with patient, let her know a refill had been entered for her

## 2025-01-31 ENCOUNTER — PATIENT MESSAGE (OUTPATIENT)
Dept: INTERNAL MEDICINE CLINIC | Facility: CLINIC | Age: 50
End: 2025-01-31

## 2025-02-08 DIAGNOSIS — J30.2 SEASONAL ALLERGIES: ICD-10-CM

## 2025-02-11 NOTE — TELEPHONE ENCOUNTER
Last Office Visit: 10/17/2024    Last Refill:   Medication Quantity Refills Start End   cetirizine 10 MG Oral Tab 90 tablet 0 8/22/2024 --     Return to Clinic: 04/17/2025    Protocol: n/a    Refill pended. Please approve if okay. Thank you.

## 2025-02-12 RX ORDER — CETIRIZINE HYDROCHLORIDE 10 MG/1
10 TABLET ORAL DAILY
Qty: 90 TABLET | Refills: 1 | Status: SHIPPED | OUTPATIENT
Start: 2025-02-12

## 2025-02-18 ENCOUNTER — OFFICE VISIT (OUTPATIENT)
Facility: CLINIC | Age: 50
End: 2025-02-18
Payer: COMMERCIAL

## 2025-02-18 VITALS
DIASTOLIC BLOOD PRESSURE: 74 MMHG | BODY MASS INDEX: 26.46 KG/M2 | WEIGHT: 155 LBS | SYSTOLIC BLOOD PRESSURE: 112 MMHG | HEIGHT: 64 IN

## 2025-02-18 DIAGNOSIS — Z98.890 HISTORY OF ENDOMETRIAL ABLATION: ICD-10-CM

## 2025-02-18 DIAGNOSIS — Z01.419 ENCOUNTER FOR WELL WOMAN EXAM WITH ROUTINE GYNECOLOGICAL EXAM: Primary | ICD-10-CM

## 2025-02-18 DIAGNOSIS — Z30.41 ENCOUNTER FOR BIRTH CONTROL PILLS MAINTENANCE: ICD-10-CM

## 2025-02-18 DIAGNOSIS — Z12.11 SCREENING FOR COLON CANCER: ICD-10-CM

## 2025-02-18 DIAGNOSIS — Z12.31 SCREENING MAMMOGRAM FOR BREAST CANCER: ICD-10-CM

## 2025-02-18 PROCEDURE — 3074F SYST BP LT 130 MM HG: CPT

## 2025-02-18 PROCEDURE — 3078F DIAST BP <80 MM HG: CPT

## 2025-02-18 PROCEDURE — 3008F BODY MASS INDEX DOCD: CPT

## 2025-02-18 PROCEDURE — 99396 PREV VISIT EST AGE 40-64: CPT

## 2025-02-18 RX ORDER — LEVONORGESTREL/ETHIN.ESTRADIOL 0.1-0.02MG
1 TABLET ORAL DAILY
Qty: 84 TABLET | Refills: 3 | Status: SHIPPED | OUTPATIENT
Start: 2025-02-18

## 2025-02-18 NOTE — PROGRESS NOTES
GYN H&P     Genetic questionnaire reviewed with the patient and she will be referred for genetic counseling if the questionnaire had any positive results.    The [facility] intake form was also reviewed regarding contraception, menstrual periods, urinary health, and vaginal / sexual health    2025  3:12 PM    Chief Complaint   Patient presents with    Gynecologic Exam       HPI: Jamila is a 49 year old  Patient's last menstrual period was 2025 (within days).  (contraception: OCPs) here for her annual gyn exam.     She has no complaints.  Menses are regular and light. Hx of endometrial ablation in  - reports menses have been much lighter. Currently taking OCPs for contraception and period control. Unsure when any women in her family went through menopause, denies VMS. Recently broke up with her boyfriend so not currently sexually active but still wanting to use OCPs for period control. Denies any pelvic or breast complaints.  Satisfied with current contraception.     Previous encounters and chart reviewed.     OB History    Para Term  AB Living   4 2 2   2 2   SAB IAB Ectopic Multiple Live Births   2       2      # Outcome Date GA Lbr Matthew/2nd Weight Sex Type Anes PTL Lv   4 Term 2005     Vag-Spont   SPENCER   3 Term 1998 40w0d    Vag-Spont   SPENCER   2 SAB  10w0d          1 SAB  10w0d              GYN hx:   Menarche: 13  Period Cycle (Days): 25-28  Period Duration (Days): 2-3  Period Flow: light  Use of Birth Control (if yes, specify type): OCP  Pap Date: 23  Pap Result Notes: 2022 Neg/Neg; 2021  N/N, 2019  ascus/positive 18/45 meri type hpv  Follow Up Recommendation:       Past Medical History:    Allergic rhinitis due to pollen    Brain tumor (benign) (HCC)    Chronic rhinitis    Extrinsic asthma, unspecified    Hypothyroid    Hypothyroidism    Nasal polyp    Nasal polyps     Past Surgical History:   Procedure Laterality Date    D & c       Cervical Stump    Other surgical history  5/2012    NASAL POLYP DR GIBBS    Other surgical history  09/21/2021    uterine ablasion    Other surgical history Right 10/27/2021    Lasik redo     Allergies[1]  Medications Ordered Prior to Encounter[2]  Family History   Problem Relation Age of Onset    Cancer Mother         Brain    Cancer Maternal Grandmother         stomach cancer?    Diabetes Maternal Grandmother     Diabetes Maternal Grandfather     Heart Disorder Maternal Grandfather     Cancer Maternal Grandfather         unknown cancer    Suicide History Father     No Known Problems Sister      Social History     Socioeconomic History    Marital status:      Spouse name: Not on file    Number of children: Not on file    Years of education: Not on file    Highest education level: Not on file   Occupational History    Not on file   Tobacco Use    Smoking status: Never     Passive exposure: Never    Smokeless tobacco: Never   Vaping Use    Vaping status: Never Used   Substance and Sexual Activity    Alcohol use: Not Currently     Alcohol/week: 1.0 standard drink of alcohol     Types: 1 Glasses of wine per week     Comment: 1-2 drinks/wk    Drug use: No    Sexual activity: Yes     Partners: Male     Birth control/protection: OCP   Other Topics Concern     Service Not Asked    Blood Transfusions Not Asked    Caffeine Concern Yes     Comment: 1-2 cokes/day    Occupational Exposure Not Asked    Hobby Hazards Not Asked    Sleep Concern Not Asked    Stress Concern Not Asked    Weight Concern Not Asked    Special Diet Not Asked    Back Care Not Asked    Exercise Yes     Comment: 2xweek cardio    Bike Helmet Not Asked    Seat Belt Yes    Self-Exams Not Asked   Social History Narrative    Not on file     Social Drivers of Health     Food Insecurity: Not on file   Transportation Needs: Not on file   Stress: Not on file   Housing Stability: Not on file       ROS:     Review of Systems:  General: denies fevers,  chills, fatigue and malaise.   Eyes: no visual changes, denies headaches  ENT: no complaints, denies earaches, runny nose, epistaxis, throat pain or sore throat  Respiratory: denies SOB, dyspnea, cough or wheezing  Cardiovascular: denies chest pain, palpitations, exercise intolerance   GI: denies abdominal pain, diarrhea, constipation  : no complaints, denies dysuria, increased urinary frequency. Menses regular, no dysmenorrhea, no menorrhagia, no dyspareunia   Hematological/lymphatic: denies history of excessive bleeding or bruising, denies dizziness, lightheadedness.   Breast: denies rashes, skin changes, pain, lumps or discharge   Psychiatric: denies depression, changes in sleep patterns, anxiety  Endocrine: denies hot or cold intolerance, mood changes   Neurological: denies changes in sight, smell, hearing or taste. Denies seizures or tremors  Immunological: denies allergies, denies anaphylaxis, or swollen lymph nodes  Musculoskeletal: denies joint pain, morning stiffness, decreased range of motion         O /74   Ht 64\"   Wt 155 lb (70.3 kg)   LMP 02/07/2025 (Within Days)   BMI 26.61 kg/m²         Wt Readings from Last 6 Encounters:   02/18/25 155 lb (70.3 kg)   10/17/24 142 lb (64.4 kg)   04/25/24 159 lb (72.1 kg)   04/18/24 156 lb (70.8 kg)   12/13/23 145 lb (65.8 kg)   12/07/23 142 lb (64.4 kg)     Exam:   GENERAL: well developed, well nourished, in no apparent distress, oriented.  SKIN: no rashes, no suspicious lesions  HEENT: normal  NECK: supple; no thyromegaly, no adenopathy  LUNGS: clear to auscultation  CARDIOVASCULAR: normal S1, S2, RRR  BREASTS: soft, nontender, no palpable masses or nodes, no nipple discharge, no skin changes, no axillary adenopathy  ABDOMEN: no scars,  soft, non distended; non tender, no masses  PELVIC: External Genitalia: Normal appearing, no lesions.    Vagina: normal pink mucosa, no lesions, normal clear discharge.    Bladder well supported.  No  anterior or  posterior hernias    Cervix: multiparous, no lesions , No CMT     Uterus: AVAF, mobile, non tender, normal size    Adnexa: non tender, no masses, normal size    Rectal: deferred  EXTREMITIES:  non tender without edema        A/P: Patient is 49 year old female with no complaints. Here for well woman exam.            Patient counseled on:    Diet/exercise.      Self Breast Exams     Safe sex practices / and living environment     Vaccines:  Annual Flu          Pap: neg/neg - Year:  11/2023  GC/Chlamydia:  n/a  Mammogram:  1/2024, reordered   Dexa:  n/a  Colonoscopy / Cologuard:   cologuard ordered  Lipid / Cholesterol:  2024 per PCP         Meds This Visit:    Requested Prescriptions     Signed Prescriptions Disp Refills    Levonorgestrel-Ethinyl Estrad (AVIANE) 0.1-20 MG-MCG Oral Tab 84 tablet 3     Sig: Take 1 tablet by mouth daily.       1. Encounter for well woman exam with routine gynecological exam    2. Screening mammogram for breast cancer  - [facility] SANDRA 2D+3D SCREENING BILAT (CPT=77067/26175); Future    3. Screening for colon cancer  - COLOGUARD COLON CANCER SCREENING (EXTERNAL)    4. History of endometrial ablation - 2021    5. Encounter for birth control pills maintenance  - Levonorgestrel-Ethinyl Estrad (AVIANE) 0.1-20 MG-MCG Oral Tab; Take 1 tablet by mouth daily.  Dispense: 84 tablet; Refill: 3    Discussed recommendation for colonoscopy - pt states her PCP has been trying to get her to do it but she would prefer another option. Discussed cologuard test including its limitations, pt accepting and requisition form faxed    Discussed ACOG recommendations on continuing OCPs/when to stop OCPs - pt would still like to continue OCPs at this time and reevaluate next year. Discussed risks vs benefits of OCP use, pt verbalized understanding    Return in about 1 year (around 2/18/2026) for Well Woman Exam.    Zabrina Maradiaga, APRN   2/18/2025  3:12 PM       This note was created by [employer] voice recognition. Errors  in content may be related to improper recognition by the system; efforts to review and correct have been done but errors may still exist. Please contact me with any questions.    Note to patient and family   The 21st Century Cures Act makes medical notes available to patients in the interest of transparency.  However, please be advised that this is a medical document.  It is intended as gzpe-to-lana communication.  It is written in medical language which may contain abbreviations or verbiage that are technical and unfamiliar.  It may appear blunt or direct.  Medical documents are intended to carry relevant information, facts as evident, and the clinical opinion of the practitioner.           [1]   Allergies  Allergen Reactions    Aspirin Tightness in Throat    Levaquin [Levofloxacin Hemihydrate] RASH    Nsaids     Penicillins      As a Child   [2]   Current Outpatient Medications on File Prior to Visit   Medication Sig Dispense Refill    cetirizine (EQ ALLERGY RELIEF, CETIRIZINE,) 10 MG Oral Tab Take 1 tablet (10 mg total) by mouth daily. 90 tablet 1    albuterol 108 (90 Base) MCG/ACT Inhalation Aero Soln Inhale 2 puffs into the lungs every 4 (four) hours as needed for Wheezing. 3 each 0    Tretinoin 0.05 % External Cream APPLY TOPICALLY TO AFFECTED AREA AT BEDTIME AS NEEDED 45 g 0    TOPIRAMATE 100 MG Oral Tab TAKE 1 TABLET BY MOUTH TWICE A  tablet 0    BUPROPION  MG Oral Tablet 12 Hr TAKE 1 TABLET BY MOUTH TWICE A  tablet 0    Dupilumab (DUPIXENT) 300 MG/2ML Subcutaneous Solution Auto-injector Inject 300 mg into the skin every 14 (fourteen) days. 4 mL 2    levothyroxine 150 MCG Oral Tab Take 1 tablet (150 mcg total) by mouth before breakfast. 90 tablet 0    EPINEPHrine (EPIPEN 2-STEPHANIE) 0.3 MG/0.3ML Injection Solution Auto-injector Inject 0.3 mL (1 each total) as directed as needed. 1 each 0    fluticasone-umeclidin-vilant (TRELEGY ELLIPTA) 200-62.5-25 MCG/ACT Inhalation Aerosol Powder, Breath  Activated Inhale 1 puff into the lungs daily. 3 each 1    Insulin Pen Needle (PEN NEEDLES) 32G X 4 MM Does not apply Misc Inject 1 each into the skin daily. 100 each 1    triamcinolone 0.1 % External Cream Apply topically 2 (two) times daily as needed. 60 g 3    albuterol sulfate (2.5 MG/3ML) 0.083% Inhalation Nebu Soln Take 3 mL (2.5 mg total) by nebulization every 4 (four) hours as needed for Wheezing. 1 Box 1    [DISCONTINUED] AVIANE 0.1-20 MG-MCG Oral Tab Take 1 tablet by mouth once daily 84 tablet 0    PREDNISONE 10 MG Oral Tab TAKE 3 TABS(30 MG) WITH FOOD ONCE A DAY X 10 DAYS (Patient not taking: Reported on 2/18/2025) 30 tablet 0    DUPIXENT 300 MG/2ML Subcutaneous Solution Pen-injector INJECT 1 PEN UNDER THE SKIN (SUBCUTANEOUS INJECTION) EVERY 14 DAYS. (Patient not taking: Reported on 2/18/2025) 4 each 0    levothyroxine 175 MCG Oral Tab Take 1 tablet (175 mcg total) by mouth before breakfast. (Patient not taking: Reported on 2/18/2025) 90 tablet 0    predniSONE 10 MG Oral Tab Take 40mg q day x 5 days,then 30mg x 3 days,then 20 mg x 3 days,then10 mg x 3 days with food (Patient not taking: Reported on 2/18/2025) 38 tablet 0     No current facility-administered medications on file prior to visit.

## 2025-02-20 ENCOUNTER — MED REC SCAN ONLY (OUTPATIENT)
Facility: CLINIC | Age: 50
End: 2025-02-20

## 2025-02-20 DIAGNOSIS — J33.9 NASAL POLYP: ICD-10-CM

## 2025-02-20 RX ORDER — DUPILUMAB 300 MG/2ML
300 INJECTION, SOLUTION SUBCUTANEOUS
Qty: 4 ML | Refills: 2 | Status: SHIPPED | OUTPATIENT
Start: 2025-02-20

## 2025-02-20 NOTE — TELEPHONE ENCOUNTER
Patient last seen in Allergy 10/21/2024 for . . .      Chronic ethmoidal sinusitis Yes     Nasal polyp      Moderate persistent extrinsic asthma without complication (HCC)      Seasonal and perennial allergic rhinoconjunctivitis      Allergy to nonsteroidal anti-inflammatory drug (NSAID)      Penicillin allergy      Requested Prescriptions   Pending Prescriptions Disp Refills    Dupilumab (DUPIXENT) 300 MG/2ML Subcutaneous Solution Auto-injector 4 mL 2     Sig: Inject 300 mg into the skin every 14 (fourteen) days.       Biologic Medications Passed - 2/20/2025  9:20 AM        Passed - Appt in past 6 mos or next 3 mos     Recent Outpatient Visits              2 days ago Encounter for well woman exam with routine gynecological exam    [facility] Zabrina Maradiaga APRN    Office Visit    4 months ago Chronic ethmoidal sinusitis    [facility] Ivan Ceballos MD    Telemedicine    4 months ago Moderate persistent asthma without complication (HCC)    [address]    Office Visit    5 months ago Encounter for therapeutic drug monitoring    [address] Analilia Preston MD    Telemedicine    10 months ago Encounter for therapeutic drug monitoring    [address] Analilia Preston MD    Office Visit          Future Appointments         Provider Department Appt Notes    In 5 days Analilia Preston MD [address]     In 2 months Analilia Preston MD [address] Refill                    Passed - Medication is active on med list            Disp Refills Start End    Dupilumab (DUPIXENT) 300 MG/2ML Subcutaneous Solution Auto-injector 4 mL 2 2/20/2025 --    Sig - Route: Inject 300 mg into the skin every 14 (fourteen) days.  Please schedule a follow-up appointment with Dr. Ceballos in order to receive further refills - Subcutaneous    Sent to pharmacy as: Dupixent 300 MG/2ML Subcutaneous Solution Auto-injector (Dupilumab)    E-Prescribing Status: Receipt confirmed by pharmacy (2/20/2025  9:25 AM CST)      Associated Diagnoses    Nasal polyp        Pharmacy    [facility] [id number] [facility] [phone], [phone]     Prescription as above sent to pharmacy per protocol.

## 2025-02-25 ENCOUNTER — OFFICE VISIT (OUTPATIENT)
Dept: INTERNAL MEDICINE CLINIC | Facility: CLINIC | Age: 50
End: 2025-02-25
Payer: COMMERCIAL

## 2025-02-25 VITALS
WEIGHT: 155 LBS | HEIGHT: 64 IN | RESPIRATION RATE: 20 BRPM | BODY MASS INDEX: 26.46 KG/M2 | HEART RATE: 87 BPM | SYSTOLIC BLOOD PRESSURE: 120 MMHG | DIASTOLIC BLOOD PRESSURE: 76 MMHG

## 2025-02-25 DIAGNOSIS — E66.9 OBESITY (BMI 30-39.9): ICD-10-CM

## 2025-02-25 DIAGNOSIS — G47.33 OSA (OBSTRUCTIVE SLEEP APNEA): ICD-10-CM

## 2025-02-25 DIAGNOSIS — Z51.81 ENCOUNTER FOR THERAPEUTIC DRUG MONITORING: Primary | ICD-10-CM

## 2025-02-25 DIAGNOSIS — E03.9 HYPOTHYROIDISM, UNSPECIFIED TYPE: ICD-10-CM

## 2025-02-25 DIAGNOSIS — E78.5 DYSLIPIDEMIA: ICD-10-CM

## 2025-02-25 PROCEDURE — 3074F SYST BP LT 130 MM HG: CPT | Performed by: INTERNAL MEDICINE

## 2025-02-25 PROCEDURE — 99214 OFFICE O/P EST MOD 30 MIN: CPT | Performed by: INTERNAL MEDICINE

## 2025-02-25 PROCEDURE — 3008F BODY MASS INDEX DOCD: CPT | Performed by: INTERNAL MEDICINE

## 2025-02-25 PROCEDURE — 3078F DIAST BP <80 MM HG: CPT | Performed by: INTERNAL MEDICINE

## 2025-02-25 NOTE — PROGRESS NOTES
HISTORY OF PRESENT ILLNESS  Chief Complaint   Patient presents with    Weight Check     Down 4        Jamila Vivar is a 49 year old female here for follow up in medical weight loss program.     Was down to 138 lb   Does struggle around the holidays and eating   Was off wegovy / missed follow up   Ready to resume medication   Continues to make lifestyle changes with weight loss and high cholesterol   Started a 3rd job   Overall sleep doing well         [facility] Follow Up    General Information  Nutrition Recall  Exercise     Sleep                Wt Readings from Last 6 Encounters:   02/25/25 155 lb (70.3 kg)   02/18/25 155 lb (70.3 kg)   10/17/24 142 lb (64.4 kg)   04/25/24 159 lb (72.1 kg)   04/18/24 156 lb (70.8 kg)   12/13/23 145 lb (65.8 kg)            REVIEW OF SYSTEMS  GENERAL HEALTH: feels well otherwise, denied any fevers chills or night sweats   RESPIRATORY: denies shortness of breath   CARDIOVASCULAR: denies chest pain  GI: denies abdominal pain    EXAM  /76   Pulse 87   Resp 20   Ht 5' 4\" (1.626 m)   Wt 155 lb (70.3 kg)   LMP 02/07/2025 (Within Days)   BMI 26.61 kg/m²   EXAM  Reviewed most recent set of vitals   Physical Exam:  alert, appears stated age and cooperative, Normocephalic, without obvious abnormality, atraumatic, lips, mucosa, and tongue normal; teeth and gums normal, Speaking in full sentences comfortably, Normal work of breathing, Skin color, texture, turgor normal. No rashes or lesions and age appropriate, normal, logical connections and person, place and time/date      Lab Results   Component Value Date    WBC 8.7 04/13/2024    RBC 4.90 04/13/2024    HGB 15.0 04/13/2024    HCT 44.8 04/13/2024    MCV 91.4 04/13/2024    MCH 30.6 04/13/2024    MCHC 33.5 04/13/2024    RDW 13.2 04/13/2024    .0 04/13/2024    MPV 11.1 10/20/2012     Lab Results   Component Value Date    GLU 72 04/13/2024    BUN 14 04/13/2024    BUNCREA 10.8 12/01/2022    CREATSERUM 0.90 04/13/2024    ANIONGAP 5  04/13/2024    GFR 74 01/27/2018    GFRNAA 97 09/11/2021    GFRAA 112 09/11/2021    CA 8.9 04/13/2024    OSMOCALC 283 04/13/2024    ALKPHO 43 04/13/2024    AST 10 (L) 04/13/2024    ALT 26 04/13/2024    BILT 0.8 04/13/2024    TP 7.2 04/13/2024    ALB 3.7 04/13/2024    GLOBULIN 3.5 04/13/2024    AGRATIO 1.7 01/17/2015     04/13/2024    K 3.6 04/13/2024     04/13/2024    CO2 26.0 04/13/2024     Lab Results   Component Value Date    EAG 85 08/31/2020    A1C 4.6 08/31/2020     Lab Results   Component Value Date    CHOLEST 179 04/13/2024    TRIG 100 04/13/2024    HDL 67 (H) 04/13/2024    LDL 94 04/13/2024    VLDL 16 04/13/2024    TCHDLRATIO 2.74 01/27/2018    NONHDLC 112 04/13/2024     Lab Results   Component Value Date    T4F 1.2 12/07/2024    TSH 0.409 (L) 12/07/2024    TSHT4 0.04 (L) 10/21/2015     Lab Results   Component Value Date    B12 389 10/18/2023     Lab Results   Component Value Date    VITD 33.0 10/18/2023       Current Outpatient Medications on File Prior to Visit   Medication Sig Dispense Refill    Dupilumab (DUPIXENT) 300 MG/2ML Subcutaneous Solution Auto-injector Inject 300 mg into the skin every 14 (fourteen) days. Please schedule a follow-up appointment with Dr. Ceballos in order to receive further refills 4 mL 2    Levonorgestrel-Ethinyl Estrad (AVIANE) 0.1-20 MG-MCG Oral Tab Take 1 tablet by mouth daily. 84 tablet 3    cetirizine (EQ ALLERGY RELIEF, CETIRIZINE,) 10 MG Oral Tab Take 1 tablet (10 mg total) by mouth daily. 90 tablet 1    albuterol 108 (90 Base) MCG/ACT Inhalation Aero Soln Inhale 2 puffs into the lungs every 4 (four) hours as needed for Wheezing. 3 each 0    Tretinoin 0.05 % External Cream APPLY TOPICALLY TO AFFECTED AREA AT BEDTIME AS NEEDED 45 g 0    TOPIRAMATE 100 MG Oral Tab TAKE 1 TABLET BY MOUTH TWICE A  tablet 0    PREDNISONE 10 MG Oral Tab TAKE 3 TABS(30 MG) WITH FOOD ONCE A DAY X 10 DAYS 30 tablet 0    BUPROPION  MG Oral Tablet 12 Hr TAKE 1 TABLET BY MOUTH  TWICE A  tablet 0    levothyroxine 150 MCG Oral Tab Take 1 tablet (150 mcg total) by mouth before breakfast. 90 tablet 0    EPINEPHrine (EPIPEN 2-STEPHANIE) 0.3 MG/0.3ML Injection Solution Auto-injector Inject 0.3 mL (1 each total) as directed as needed. 1 each 0    DUPIXENT 300 MG/2ML Subcutaneous Solution Pen-injector INJECT 1 PEN UNDER THE SKIN (SUBCUTANEOUS INJECTION) EVERY 14 DAYS. 4 each 0    predniSONE 10 MG Oral Tab Take 40mg q day x 5 days,then 30mg x 3 days,then 20 mg x 3 days,then10 mg x 3 days with food 38 tablet 0    fluticasone-umeclidin-vilant (TRELEGY ELLIPTA) 200-62.5-25 MCG/ACT Inhalation Aerosol Powder, Breath Activated Inhale 1 puff into the lungs daily. 3 each 1    triamcinolone 0.1 % External Cream Apply topically 2 (two) times daily as needed. 60 g 3    albuterol sulfate (2.5 MG/3ML) 0.083% Inhalation Nebu Soln Take 3 mL (2.5 mg total) by nebulization every 4 (four) hours as needed for Wheezing. 1 Box 1     No current facility-administered medications on file prior to visit.       ASSESSMENT  Analyzed weight data:       Diagnoses and all orders for this visit:    Encounter for therapeutic drug monitoring    BRENDAN (obstructive sleep apnea)    Dyslipidemia    Hypothyroidism, unspecified type    Obesity (BMI 30-39.9)    Other orders  -     semaglutide-weight management 1 MG/0.5ML Subcutaneous Solution Auto-injector; Inject 0.5 mL (1 mg total) into the skin once a week for 4 doses.  -     semaglutide-weight management 1.7 MG/0.75ML Subcutaneous Solution Auto-injector; Inject 0.75 mL (1.7 mg total) into the skin once a week for 28 days.  -     semaglutide-weight management 2.4 MG/0.75ML Subcutaneous Solution Auto-injector; Inject 0.75 mL (2.4 mg total) into the skin once a week for 4 doses.              PLAN  Initial consult: 8/2/16 with [facility]   Weight max 4/3/19 on 179 lb    Down 28 lb   Restart  wegovy   -advised of side effects and adverse effects of this medication  Injection teaching in OV    Failed phentermine   -advised of side effects and adverse effects of this medication  Tried saxenda in the past : No further weight loss  Cannot take contrave on wellbutrin     Continue topamax and wellbutrin xl 150   Needs to track nutrition, increase vegetables / protein especially   HLD-continue dietary changes  BRENDAN-continue treatment   Nutrition: low carb diet/ recommended to eat breakfast daily/ regular protein intake  Medication use and side effects reviewed with patient.  Medication contraindications:phendimetrazine   Follow up with dietitian and psychologist as recommended.  Discussed the role of sleep and stress in weight management.  Counseled on comprehensive weight loss plan including attention to nutrition, exercise and behavior/stress management for success. See patient instruction below for more details.  Discussed strategies to overcome barriers to successful weight loss and weight maintenance  FITTE: ACSM recommendations (150-300 minutes/ week in active weight loss)    There are no Patient Instructions on file for this visit.    No follow-ups on file.    Patient verbalizes understanding.    Analilia Preston MD

## 2025-03-03 ENCOUNTER — OFFICE VISIT (OUTPATIENT)
Dept: FAMILY MEDICINE CLINIC | Facility: CLINIC | Age: 50
End: 2025-03-03
Payer: COMMERCIAL

## 2025-03-03 ENCOUNTER — HOSPITAL ENCOUNTER (OUTPATIENT)
Dept: GENERAL RADIOLOGY | Age: 50
Discharge: HOME | End: 2025-03-03
Attending: FAMILY MEDICINE
Payer: COMMERCIAL

## 2025-03-03 VITALS
TEMPERATURE: 98 F | WEIGHT: 153 LBS | BODY MASS INDEX: 26.12 KG/M2 | HEIGHT: 64 IN | DIASTOLIC BLOOD PRESSURE: 72 MMHG | SYSTOLIC BLOOD PRESSURE: 116 MMHG | HEART RATE: 74 BPM | RESPIRATION RATE: 16 BRPM

## 2025-03-03 DIAGNOSIS — M79.644 CHRONIC PAIN OF RIGHT THUMB: Primary | ICD-10-CM

## 2025-03-03 DIAGNOSIS — G89.29 CHRONIC PAIN OF RIGHT THUMB: ICD-10-CM

## 2025-03-03 DIAGNOSIS — M79.644 CHRONIC PAIN OF RIGHT THUMB: ICD-10-CM

## 2025-03-03 DIAGNOSIS — G89.29 CHRONIC PAIN OF RIGHT THUMB: Primary | ICD-10-CM

## 2025-03-03 PROCEDURE — G2211 COMPLEX E/M VISIT ADD ON: HCPCS | Performed by: FAMILY MEDICINE

## 2025-03-03 PROCEDURE — 3074F SYST BP LT 130 MM HG: CPT | Performed by: FAMILY MEDICINE

## 2025-03-03 PROCEDURE — 99213 OFFICE O/P EST LOW 20 MIN: CPT | Performed by: FAMILY MEDICINE

## 2025-03-03 PROCEDURE — 73140 X-RAY EXAM OF FINGER(S): CPT | Performed by: FAMILY MEDICINE

## 2025-03-03 PROCEDURE — 3008F BODY MASS INDEX DOCD: CPT | Performed by: FAMILY MEDICINE

## 2025-03-03 PROCEDURE — 3078F DIAST BP <80 MM HG: CPT | Performed by: FAMILY MEDICINE

## 2025-03-04 DIAGNOSIS — J33.9 NASAL POLYP: ICD-10-CM

## 2025-03-04 RX ORDER — DUPILUMAB 300 MG/2ML
INJECTION, SOLUTION SUBCUTANEOUS
Qty: 4 ML | Refills: 5 | Status: SHIPPED | OUTPATIENT
Start: 2025-03-04

## 2025-03-04 NOTE — TELEPHONE ENCOUNTER
Patient last seen in Allergy 10/21/2024 for . . .    Chronic ethmoidal sinusitis Yes     Nasal polyp      Moderate persistent extrinsic asthma without complication (HCC)      Seasonal and perennial allergic rhinoconjunctivitis      Allergy to nonsteroidal anti-inflammatory drug (NSAID)       Dupixent last prescribed 2/20/2025 for 3 month of refills.               Requested Prescriptions   Pending Prescriptions Disp Refills    DUPIXENT 300 MG/2ML Subcutaneous Solution Auto-injector [Pharmacy Med Name: DUPIXENT 300 MG/2ML SOLUTION AUTO-INJECTOR]  0     Sig: INJECT 1 PEN (300 MG) UNDER THE SKIN (SUBCUTANEOUS INJECTION) EVERY 14 (FOURTEEN) DAYS       Biologic Medications Failed - 3/4/2025  8:18 AM        Failed - Medication is active on med list        Passed - Appt in past 6 mos or next 3 mos     Recent Outpatient Visits              Yesterday Chronic pain of right thumb    [address]     Office Visit    1 week ago Encounter for therapeutic drug monitoring    [address] Analilia Preston MD    Office Visit    2 weeks ago Encounter for well woman exam with routine gynecological exam    [facility] Zabrina Maradiaga, CASEY    Office Visit    4 months ago [facility]moidal sinusitis    [facility]    Telemedicine    4 months ago Moderate persistent asthma without complication (HCC)    [address]     Office Visit          Future Appointments         Provider Department Appt Notes    In 2 months Analilia Preston MD [address] Refill    In 6 months Analilia Preston MD [address]

## 2025-03-05 DIAGNOSIS — M79.644 CHRONIC PAIN OF RIGHT THUMB: Primary | ICD-10-CM

## 2025-03-05 DIAGNOSIS — G89.29 CHRONIC PAIN OF RIGHT THUMB: Primary | ICD-10-CM

## 2025-03-05 NOTE — PROGRESS NOTES
[facility] Family Medicine Office Note  Chief Complaint:   Chief Complaint   Patient presents with    Finger Pain     Right thumb pain since November. Was catching a steel ladder.        HPI:   This is a 49 year old female coming in for persistent right thumb pain.  Patient caught a ladder as it fell from the attic ceiling.  It hit her right thumb near the base.  This occurred around November 1, 2024.  She continues to have pain most days.  Denies any redness or swelling.      Past Medical History:    Allergic rhinitis due to pollen    Brain tumor (benign) (HCC)    Chronic rhinitis    Extrinsic asthma, unspecified    Hypothyroid    Hypothyroidism    Nasal polyp    Nasal polyps     Past Surgical History:   Procedure Laterality Date    D & c  2003    Cervical Stump    Other surgical history  5/2012    NASAL POLYP DR GIBBS    Other surgical history  09/21/2021    uterine ablasion    Other surgical history Right 10/27/2021    Lasik redo     Social History:  Social History     Socioeconomic History    Marital status:    Tobacco Use    Smoking status: Never     Passive exposure: Never    Smokeless tobacco: Never   Vaping Use    Vaping status: Never Used   Substance and Sexual Activity    Alcohol use: Not Currently     Alcohol/week: 1.0 standard drink of alcohol     Types: 1 Glasses of wine per week     Comment: 1-2 drinks/wk    Drug use: No    Sexual activity: Yes     Partners: Male     Birth control/protection: OCP   Other Topics Concern    Caffeine Concern Yes     Comment: 1-2 cokes/day    Exercise Yes     Comment: 2xweek cardio    Seat Belt Yes     Family History:  Family History   Problem Relation Age of Onset    Cancer Mother         Brain    Cancer Maternal Grandmother         stomach cancer?    Diabetes Maternal Grandmother     Diabetes Maternal Grandfather     Heart Disorder Maternal Grandfather     Cancer Maternal Grandfather         unknown cancer    Suicide History Father     No Known  Problems Sister      Allergies:  Allergies[1]  Current Meds:  Current Outpatient Medications   Medication Sig Dispense Refill    semaglutide-weight management 1 MG/0.5ML Subcutaneous Solution Auto-injector Inject 0.5 mL (1 mg total) into the skin once a week for 4 doses. 2 mL 0    semaglutide-weight management 1.7 MG/0.75ML Subcutaneous Solution Auto-injector Inject 0.75 mL (1.7 mg total) into the skin once a week for 28 days. 3 mL 0    semaglutide-weight management 2.4 MG/0.75ML Subcutaneous Solution Auto-injector Inject 0.75 mL (2.4 mg total) into the skin once a week for 4 doses. 3 mL 0    Levonorgestrel-Ethinyl Estrad (AVIANE) 0.1-20 MG-MCG Oral Tab Take 1 tablet by mouth daily. 84 tablet 3    cetirizine (EQ ALLERGY RELIEF, CETIRIZINE,) 10 MG Oral Tab Take 1 tablet (10 mg total) by mouth daily. 90 tablet 1    albuterol 108 (90 Base) MCG/ACT Inhalation Aero Soln Inhale 2 puffs into the lungs every 4 (four) hours as needed for Wheezing. 3 each 0    Tretinoin 0.05 % External Cream APPLY TOPICALLY TO AFFECTED AREA AT BEDTIME AS NEEDED 45 g 0    TOPIRAMATE 100 MG Oral Tab TAKE 1 TABLET BY MOUTH TWICE A  tablet 0    PREDNISONE 10 MG Oral Tab TAKE 3 TABS(30 MG) WITH FOOD ONCE A DAY X 10 DAYS 30 tablet 0    BUPROPION  MG Oral Tablet 12 Hr TAKE 1 TABLET BY MOUTH TWICE A  tablet 0    levothyroxine 150 MCG Oral Tab Take 1 tablet (150 mcg total) by mouth before breakfast. 90 tablet 0    EPINEPHrine (EPIPEN 2-STEPHANIE) 0.3 MG/0.3ML Injection Solution Auto-injector Inject 0.3 mL (1 each total) as directed as needed. 1 each 0    DUPIXENT 300 MG/2ML Subcutaneous Solution Pen-injector INJECT 1 PEN UNDER THE SKIN (SUBCUTANEOUS INJECTION) EVERY 14 DAYS. 4 each 0    predniSONE 10 MG Oral Tab Take 40mg q day x 5 days,then 30mg x 3 days,then 20 mg x 3 days,then10 mg x 3 days with food 38 tablet 0    fluticasone-umeclidin-vilant (TRELEGY ELLIPTA) 200-62.5-25 MCG/ACT Inhalation Aerosol Powder, Breath Activated Inhale 1  puff into the lungs daily. 3 each 1    triamcinolone 0.1 % External Cream Apply topically 2 (two) times daily as needed. 60 g 3    albuterol sulfate (2.5 MG/3ML) 0.083% Inhalation Nebu Soln Take 3 mL (2.5 mg total) by nebulization every 4 (four) hours as needed for Wheezing. 1 Box 1    Dupilumab (DUPIXENT) 300 MG/2ML Subcutaneous Solution Auto-injector INJECT 1 PEN (300 MG) UNDER THE SKIN (SUBCUTANEOUS INJECTION) EVERY 14 (FOURTEEN) DAYS 4 mL 5      Counseling given: Not Answered       REVIEW OF SYSTEMS:   ROS:  CONSTITUTIONAL:  Denies any unusual weight gain/loss, fever, chills, weakness or fatigue.  CARDIOVASCULAR:  Denies chest pain, chest pressure or chest discomfort. No palpitations or edema.  Denies any dyspnea on exertion or at rest  RESPIRATORY:  Denies shortness of breath, cough  GASTROINTESTINAL:  Denies any abdominal pain, nausea, vomiting  NEUROLOGICAL:  Denies headache, dizziness, syncope, numbness or tingling in the extremities.  MUSCULOSKELETAL:  + right thumb pain    EXAM:   /72   Pulse 74   Temp 98 °F (36.7 °C) (Temporal)   Resp 16   Ht 5' 4\" (1.626 m)   Wt 153 lb (69.4 kg)   LMP 02/07/2025 (Within Days)   BMI 26.26 kg/m²  Estimated body mass index is 26.26 kg/m² as calculated from the following:    Height as of this encounter: 5' 4\" (1.626 m).    Weight as of this encounter: 153 lb (69.4 kg).   Vital signs reviewed.Appears stated age, well groomed.  Physical Exam:  GEN:  Patient is alert and oriented x3, no apparent distress  HEAD:  Normocephalic, atraumatic  LUNGS: clear to auscultation bilaterally, no rales/rhonchi/wheezing  HEART:  Regular rate and rhythm, no murmurs, rubs or gallops  ABDOMEN:  Soft, nondistended, nontender, bowel sounds normal in all 4 quadrants, no hepatosplenomegaly  EXTREMITIES:  Strength intact with 5/5 bilaterally upper and lower extremities, no edema noted  NEURO:  CN 2 - 12 grossly intact     ASSESSMENT AND PLAN:   1. Chronic pain of right thumb  -   persistent  -  xray negative for fracture  -  likely chronic sprain  -  refer to ortho hand surgery if pain persists  -  continue ibuprofen PRN  - XR FINGER(S) (MIN 2 VIEWS), RIGHT THUMB (CPT=73140); Future      Meds & Refills for this Visit:  Requested Prescriptions      No prescriptions requested or ordered in this encounter       Health Maintenance:  Health Maintenance Due   Topic Date Due    Colorectal Cancer Screening  Never done    Asthma Control Test  06/04/2019    COVID-19 Vaccine (4 - 2024-25 season) 09/01/2024    Influenza Vaccine (1) 10/01/2024    Zoster Vaccines (1 of 2) Never done    Mammogram  01/08/2025    Annual Physical  04/18/2025       Patient/Caregiver Education: Patient/Caregiver Education: There are no barriers to learning. Medical education done.   Outcome: Patient verbalizes understanding. Patient is notified to call with any questions, complications, allergies, or worsening or changing symptoms.  Patient is to call with any side effects or complications from the treatments as a result of today.     Problem List:  Patient Active Problem List   Diagnosis    Nontoxic multinodular goiter    Nontoxic uninodular goiter    Allergic rhinitis, cause unspecified    Herpes simplex without mention of complication    Abnormal weight gain    Hypothyroidism    Unspecified asthma(493.90)    Sinusitis    Overweight    Acquired hypothyroidism    Overweight (BMI 25.0-29.9)    Encounter for therapeutic drug monitoring    Fatigue    Heterozygous MTHFR mutation C677T    Low vitamin B12 level    Vitamin D deficiency    Extrinsic asthma (HCC)    ASCUS with positive high risk HPV cervical ( positive 18/45 genotype 04/03/2019 )    DUB (dysfunctional uterine bleeding)    History of endometrial ablation - 2021       KIRA DURAN, DO    Please note that portions of this note may have been completed with a voice recognition program. Efforts were made to edit the dictations but occasionally words are  mis-transcribed.         [1]   Allergies  Allergen Reactions    Aspirin Tightness in Throat    Levaquin [Levofloxacin Hemihydrate] RASH    Nsaids     Penicillins      As a Child

## 2025-03-10 RX ORDER — TOPIRAMATE 100 MG/1
100 TABLET, FILM COATED ORAL 2 TIMES DAILY
Qty: 180 TABLET | Refills: 0 | Status: SHIPPED | OUTPATIENT
Start: 2025-03-10

## 2025-03-10 NOTE — TELEPHONE ENCOUNTER
Requesting   Requested Prescriptions     Pending Prescriptions Disp Refills    TOPIRAMATE 100 MG Oral Tab [Pharmacy Med Name: TOPIRAMATE 100 MG TABLET] 180 tablet 0     Sig: TAKE 1 TABLET BY MOUTH TWICE A DAY      LOV: 02/25/25  RTC: 05/05/25  Filled: 12/10/24 #180 with 0 refills    Future Appointments   Date Time Provider Department Center   3/22/2025  3:00 PM [facility] [place]   5/5/2025 12:20 PM Analilia Preston MD [facility]   9/18/2025 12:00 PM Analilia Preston MD [facility]

## 2025-03-22 ENCOUNTER — HOSPITAL ENCOUNTER (OUTPATIENT)
Dept: MAMMOGRAPHY | Age: 50
Discharge: HOME | End: 2025-03-22
Payer: COMMERCIAL

## 2025-03-22 DIAGNOSIS — Z12.31 SCREENING MAMMOGRAM FOR BREAST CANCER: ICD-10-CM

## 2025-03-22 PROCEDURE — 77067 SCR MAMMO BI INCL CAD: CPT

## 2025-03-22 PROCEDURE — 77063 BREAST TOMOSYNTHESIS BI: CPT

## 2025-03-28 RX ORDER — DOXYCYCLINE 100 MG/1
100 CAPSULE ORAL 2 TIMES DAILY
Qty: 40 CAPSULE | Refills: 0 | Status: SHIPPED | OUTPATIENT
Start: 2025-03-28 | End: 2025-04-17

## 2025-03-28 RX ORDER — PREDNISONE 10 MG/1
TABLET ORAL
Qty: 30 TABLET | Refills: 0 | Status: SHIPPED | OUTPATIENT
Start: 2025-03-28

## 2025-03-28 NOTE — TELEPHONE ENCOUNTER
RN called pt to notify her that Dr. Ceballos sent refill of Prednisone and Doxycyline.   Pt still using Dupixent.   Will keep us updated if symptoms to not improve.

## 2025-03-28 NOTE — TELEPHONE ENCOUNTER
Prescription sent.  Recommend patient make follow-up appointment if not improving over the next 10 days.  Please inquire if patient is still using Dupixent

## 2025-03-28 NOTE — TELEPHONE ENCOUNTER
Refill request for Doxycyline and Prednisone received   RN called to triage  Pt reports it started about a week and half ago    Denies facial pain or headaches  Colored mucus--yellow colored  Fever last night, unsure if she has one today  Still using Dupixent for asthma.  Denies any asthma flares, shortness of breath, chest tightness or wheezing.    Reports that she gets frequent sinus infections, multiple times a year and Dr. Ceballos typically prescribes antibiotics and steroids    Routed to Dr. Ceballos for review.    Will contact patient with his recommendations.

## 2025-03-31 ENCOUNTER — TELEPHONE (OUTPATIENT)
Facility: CLINIC | Age: 50
End: 2025-03-31

## 2025-03-31 LAB — AMB EXT COLOGUARD RESULT: NEGATIVE

## 2025-04-01 ENCOUNTER — MED REC SCAN ONLY (OUTPATIENT)
Facility: CLINIC | Age: 50
End: 2025-04-01

## 2025-04-21 ENCOUNTER — TELEPHONE (OUTPATIENT)
Dept: FAMILY MEDICINE CLINIC | Facility: CLINIC | Age: 50
End: 2025-04-21

## 2025-04-21 NOTE — TELEPHONE ENCOUNTER
Patient scheduled via [employer] with the following info    Appointment for: Jamila Vivar ([id number])   Visit type: MYCHART EXAM (2964)   5/12/2025 5:00 PM (15 minutes) with KIRA DURAN in [facility] [address]      Patient comments:   Swollen leg     Ok to wait?

## 2025-05-05 ENCOUNTER — PATIENT MESSAGE (OUTPATIENT)
Dept: FAMILY MEDICINE CLINIC | Facility: CLINIC | Age: 50
End: 2025-05-05

## 2025-05-05 ENCOUNTER — TELEPHONE (OUTPATIENT)
Dept: FAMILY MEDICINE CLINIC | Facility: CLINIC | Age: 50
End: 2025-05-05

## 2025-05-05 ENCOUNTER — OFFICE VISIT (OUTPATIENT)
Dept: FAMILY MEDICINE CLINIC | Facility: CLINIC | Age: 50
End: 2025-05-05
Payer: COMMERCIAL

## 2025-05-05 ENCOUNTER — HOSPITAL ENCOUNTER (OUTPATIENT)
Dept: ULTRASOUND IMAGING | Age: 50
Discharge: HOME | End: 2025-05-05
Attending: FAMILY MEDICINE
Payer: COMMERCIAL

## 2025-05-05 ENCOUNTER — TELEMEDICINE (OUTPATIENT)
Dept: INTERNAL MEDICINE CLINIC | Facility: CLINIC | Age: 50
End: 2025-05-05
Payer: COMMERCIAL

## 2025-05-05 VITALS
SYSTOLIC BLOOD PRESSURE: 118 MMHG | TEMPERATURE: 98 F | DIASTOLIC BLOOD PRESSURE: 74 MMHG | BODY MASS INDEX: 26.29 KG/M2 | RESPIRATION RATE: 16 BRPM | HEART RATE: 78 BPM | WEIGHT: 154 LBS | HEIGHT: 64 IN

## 2025-05-05 DIAGNOSIS — E66.9 OBESITY (BMI 30-39.9): ICD-10-CM

## 2025-05-05 DIAGNOSIS — R60.0 LEG EDEMA, RIGHT: Primary | ICD-10-CM

## 2025-05-05 DIAGNOSIS — M79.89 RIGHT LEG SWELLING: ICD-10-CM

## 2025-05-05 DIAGNOSIS — Z51.81 ENCOUNTER FOR THERAPEUTIC DRUG MONITORING: Primary | ICD-10-CM

## 2025-05-05 DIAGNOSIS — G47.33 OSA (OBSTRUCTIVE SLEEP APNEA): ICD-10-CM

## 2025-05-05 DIAGNOSIS — I89.0 LYMPHEDEMA: Primary | ICD-10-CM

## 2025-05-05 DIAGNOSIS — E78.5 DYSLIPIDEMIA: ICD-10-CM

## 2025-05-05 DIAGNOSIS — R60.0 LEG EDEMA, RIGHT: ICD-10-CM

## 2025-05-05 PROCEDURE — G2211 COMPLEX E/M VISIT ADD ON: HCPCS | Performed by: FAMILY MEDICINE

## 2025-05-05 PROCEDURE — 98006 SYNCH AUDIO-VIDEO EST MOD 30: CPT | Performed by: INTERNAL MEDICINE

## 2025-05-05 PROCEDURE — 99214 OFFICE O/P EST MOD 30 MIN: CPT | Performed by: FAMILY MEDICINE

## 2025-05-05 PROCEDURE — 3074F SYST BP LT 130 MM HG: CPT | Performed by: FAMILY MEDICINE

## 2025-05-05 PROCEDURE — 3078F DIAST BP <80 MM HG: CPT | Performed by: FAMILY MEDICINE

## 2025-05-05 PROCEDURE — 3008F BODY MASS INDEX DOCD: CPT | Performed by: FAMILY MEDICINE

## 2025-05-05 PROCEDURE — 93971 EXTREMITY STUDY: CPT | Performed by: FAMILY MEDICINE

## 2025-05-05 RX ORDER — SEMAGLUTIDE 1.7 MG/.75ML
INJECTION, SOLUTION SUBCUTANEOUS
COMMUNITY
Start: 2025-04-16

## 2025-05-05 NOTE — TELEPHONE ENCOUNTER
Patient was seen in office today for right leg edema. She just had the venous doppler ultrasound done as ordered. States that she noticed while driving that her right arm is also swollen and wants to inform Dr. Macias about it as well.

## 2025-05-05 NOTE — TELEPHONE ENCOUNTER
Patient informed of Dr. Macias's recommendations below. She verbalized understanding and agreed with plan. Patient will continue to monitor her right arm for swelling and will proceed with PT if it persists. PT referral placed.

## 2025-05-05 NOTE — PROGRESS NOTES
[facility] Family Medicine Office Note  Chief Complaint:   Chief Complaint   Patient presents with    Leg Swelling     Right leg swelling for 2 weeks. Feels tingling but no pain.        HPI:     History of Present Illness  Ms. Jamila Vivar is a 49 year old female who presents with right leg swelling.    She has experienced swelling in her right leg for the past two weeks, affecting the entire leg including the thigh. The leg feels unusual, but there is no associated pain.    No history of injury, rashes, redness, or previous episodes of similar swelling. No dyspnea or chest pain. No swelling in feet or ankles.    She leads a sedentary lifestyle, sitting frequently at work and home, but has not had recent prolonged travel. She does not smoke and is not on hormone therapy.    No pain, rashes, redness, injury, dyspnea, chest pain, or swelling in feet or ankles.         Past Medical History[1]  Past Surgical History[2]  Social History:  Short Social Hx on File[3]  Family History:  Family History[4]  Allergies:  Allergies[5]  Current Meds:  Current Medications[6]   Counseling given: Not Answered       REVIEW OF SYSTEMS:   ROS:  CONSTITUTIONAL:  Denies any unusual weight gain/loss, fever, chills, weakness or fatigue.  HEENT:  Eyes:  Denies visual loss, blurred vision, double vision or yellow sclerae. Ears, Nose, Throat:  Denies hearing loss, sneezing, congestion, runny nose or sore throat.  SKIN:  No rash or itching.  CARDIOVASCULAR:  Denies chest pain, chest pressure or chest discomfort. No palpitations, + right leg edema.  Denies any dyspnea on exertion or at rest  RESPIRATORY:  Denies shortness of breath, cough  GASTROINTESTINAL:  Denies any abdominal pain, nausea, vomiting  NEUROLOGICAL:  Denies headache, dizziness, syncope, numbness or tingling in the extremities.  MUSCULOSKELETAL:  Denies muscle, back pain, joint pain or stiffness.    EXAM:   /74   Pulse 78   Temp 98 °F (36.7 °C) (Temporal)   Resp  16   Ht 5' 4\" (1.626 m)   Wt 154 lb (69.9 kg)   LMP 04/29/2025 (Approximate)   BMI 26.43 kg/m²  Estimated body mass index is 26.43 kg/m² as calculated from the following:    Height as of this encounter: 5' 4\" (1.626 m).    Weight as of this encounter: 154 lb (69.9 kg).   Vital signs reviewed.Appears stated age, well groomed.  Physical Exam:  GEN:  Patient is alert and oriented x3, no apparent distress  HEAD:  Normocephalic, atraumatic  LUNGS: clear to auscultation bilaterally, no rales/rhonchi/wheezing  HEART:  Regular rate and rhythm, no murmurs, rubs or gallops  ABDOMEN:  Soft, nondistended, nontender, bowel sounds normal in all 4 quadrants, no hepatosplenomegaly  EXTREMITIES:  Strength intact with 5/5 bilaterally upper and lower extremities; + nonpitting edema of RLE with negative fracisco sign  NEURO:  CN 2 - 12 grossly intact     ASSESSMENT AND PLAN:   1. Leg edema, right  - US VENOUS DOPPLER LEG RIGHT - DIAG IMG (CPT=93971); Future      Assessment & Plan  Unilateral leg swelling  Right leg swelling for two weeks without pain, erythema, or trauma. Low DVT risk. Differential includes venous insufficiency or DVT. Ultrasound planned.  - Order ultrasound of the right leg to evaluate for deep vein thrombosis or venous insufficiency.  - Consider compression stockings or diuretics if ultrasound is negative and swelling persists.  - Consider lasix PRN if DVT negative          Meds & Refills for this Visit:  Requested Prescriptions      No prescriptions requested or ordered in this encounter       Health Maintenance:  Health Maintenance Due   Topic Date Due    Asthma Control Test  06/04/2019    COVID-19 Vaccine (4 - 2024-25 season) 09/01/2024    Zoster Vaccines (1 of 2) Never done    Annual Physical  04/18/2025       Patient/Caregiver Education: Patient/Caregiver Education: There are no barriers to learning. Medical education done.   Outcome: Patient verbalizes understanding. Patient is notified to call with any  questions, complications, allergies, or worsening or changing symptoms.  Patient is to call with any side effects or complications from the treatments as a result of today.     Problem List:  Problem List[7]    KIRA DURAN, DO    Please note that portions of this note may have been completed with a voice recognition program. Efforts were made to edit the dictations but occasionally words are mis-transcribed.         [1]   Past Medical History:   Allergic rhinitis due to pollen    Brain tumor (benign) (HCC)    Chronic rhinitis    Extrinsic asthma, unspecified    Hypothyroid    Hypothyroidism    Nasal polyp    Nasal polyps   [2]   Past Surgical History:  Procedure Laterality Date    D & c  2003    Cervical Stump    Other surgical history  5/2012    NASAL POLYP DR GIBBS    Other surgical history  09/21/2021    uterine ablasion    Other surgical history Right 10/27/2021    Lasik redo   [3]   Social History  Socioeconomic History    Marital status:    Tobacco Use    Smoking status: Never     Passive exposure: Never    Smokeless tobacco: Never   Vaping Use    Vaping status: Never Used   Substance and Sexual Activity    Alcohol use: Not Currently     Alcohol/week: 1.0 standard drink of alcohol     Types: 1 Glasses of wine per week     Comment: 1-2 drinks/wk    Drug use: No    Sexual activity: Yes     Partners: Male     Birth control/protection: OCP   Other Topics Concern    Caffeine Concern Yes     Comment: 1-2 cokes/day    Exercise Yes     Comment: 2xweek cardio    Seat Belt Yes   [4]   Family History  Problem Relation Age of Onset    Cancer Mother         Brain    Cancer Maternal Grandmother         stomach cancer?    Diabetes Maternal Grandmother     Diabetes Maternal Grandfather     Heart Disorder Maternal Grandfather     Cancer Maternal Grandfather         unknown cancer    Suicide History Father     No Known Problems Sister    [5]   Allergies  Allergen Reactions    Aspirin Tightness in Throat     Levaquin [Levofloxacin Hemihydrate] RASH    Nsaids     Penicillins      As a Child   [6]   Current Outpatient Medications   Medication Sig Dispense Refill    WEGOVY 1.7 MG/0.75ML Subcutaneous Solution Auto-injector INJECT 1.7 MG INTO THE SKIN ONCE WEEKLY FOR 28 DAYS      predniSONE 10 MG Oral Tab Take 3 tabs(30 mg) with food once a day x 10  days 30 tablet 0    TOPIRAMATE 100 MG Oral Tab TAKE 1 TABLET BY MOUTH TWICE A  tablet 0    Dupilumab (DUPIXENT) 300 MG/2ML Subcutaneous Solution Auto-injector INJECT 1 PEN (300 MG) UNDER THE SKIN (SUBCUTANEOUS INJECTION) EVERY 14 (FOURTEEN) DAYS 4 mL 5    Levonorgestrel-Ethinyl Estrad (AVIANE) 0.1-20 MG-MCG Oral Tab Take 1 tablet by mouth daily. 84 tablet 3    cetirizine (EQ ALLERGY RELIEF, CETIRIZINE,) 10 MG Oral Tab Take 1 tablet (10 mg total) by mouth daily. 90 tablet 1    albuterol 108 (90 Base) MCG/ACT Inhalation Aero Soln Inhale 2 puffs into the lungs every 4 (four) hours as needed for Wheezing. 3 each 0    Tretinoin 0.05 % External Cream APPLY TOPICALLY TO AFFECTED AREA AT BEDTIME AS NEEDED 45 g 0    BUPROPION  MG Oral Tablet 12 Hr TAKE 1 TABLET BY MOUTH TWICE A  tablet 0    levothyroxine 150 MCG Oral Tab Take 1 tablet (150 mcg total) by mouth before breakfast. 90 tablet 0    EPINEPHrine (EPIPEN 2-STEPHANIE) 0.3 MG/0.3ML Injection Solution Auto-injector Inject 0.3 mL (1 each total) as directed as needed. 1 each 0    DUPIXENT 300 MG/2ML Subcutaneous Solution Pen-injector INJECT 1 PEN UNDER THE SKIN (SUBCUTANEOUS INJECTION) EVERY 14 DAYS. 4 each 0    predniSONE 10 MG Oral Tab Take 40mg q day x 5 days,then 30mg x 3 days,then 20 mg x 3 days,then10 mg x 3 days with food 38 tablet 0    fluticasone-umeclidin-vilant (TRELEGY ELLIPTA) 200-62.5-25 MCG/ACT Inhalation Aerosol Powder, Breath Activated Inhale 1 puff into the lungs daily. 3 each 1    triamcinolone 0.1 % External Cream Apply topically 2 (two) times daily as needed. 60 g 3    albuterol sulfate (2.5 MG/3ML)  0.083% Inhalation Nebu Soln Take 3 mL (2.5 mg total) by nebulization every 4 (four) hours as needed for Wheezing. 1 Box 1   [7]   Patient Active Problem List  Diagnosis    Nontoxic multinodular goiter    Nontoxic uninodular goiter    Allergic rhinitis, cause unspecified    Herpes simplex without mention of complication    Abnormal weight gain    Hypothyroidism    Unspecified asthma(493.90)    Sinusitis    Overweight    Acquired hypothyroidism    Overweight (BMI 25.0-29.9)    Encounter for therapeutic drug monitoring    Fatigue    Heterozygous MTHFR mutation C677T    Low vitamin B12 level    Vitamin D deficiency    Extrinsic asthma (HCC)    ASCUS with positive high risk HPV cervical ( positive 18/45 genotype 04/03/2019 )    DUB (dysfunctional uterine bleeding)    History of endometrial ablation - 2021

## 2025-05-05 NOTE — PROGRESS NOTES
The following individual(s) verbally consented to be recorded using ambient AI listening technology and understand that they can each withdraw their consent to this listening technology at any point by asking the clinician to turn off or pause the recording:    Patient name: Jamila RENE Vivar  Additional names:

## 2025-05-05 NOTE — TELEPHONE ENCOUNTER
Patient was seen today for a swollen right leg.  She is going for ultrasound.  She thinks her right arm is swollen.  She wanted to discuss.

## 2025-05-05 NOTE — TELEPHONE ENCOUNTER
Symptoms likely due to venous insufficiency or lymphedema.  Recommend compression stockings for leg and if arm persists, can do PT for possible lymphedema.

## 2025-05-05 NOTE — PROGRESS NOTES
The following individual(s) verbally consented to be recorded using ambient AI listening technology and understand that they can each withdraw their consent to this listening technology at any point by asking the clinician to turn off or pause the recording:    Patient name: Jamila Vivar  Additional names:  n/a       HISTORY OF PRESENT ILLNESS  Chief Complaint   Patient presents with    Weight Check     Video        Jamila Vivar is a 49 year old female here for follow up in medical weight loss program.   [facility] Follow Up    General Information  Success Moment: None  Challenging Moment: None  Nutrition Recall  Exercise   Patient stated average level of stress: 1  Sleep   Patient stated # hours uninterrupted sleep: 4   Patient stated feels   restful: No      Cause of disruption of sleep: Just wake up overnigbt   Goals: Lose weigbt              History of Present Illness  Ms. Jamila Vivar is a 49 year old female who presents with leg swelling and weight management concerns.    She experiences swelling in her right leg, affecting both the thigh and calf, without associated pain, tenderness, or warmth. There have been no previous episodes of leg swelling. No history of varicose veins or old injuries on the right side.    She has been on Wegovy 1.7 mg for weight management but paused the medication for two weeks due to the leg swelling. She also stopped her Dupixent injections as a precaution. Prior to stopping Wegovy, she did not notice significant changes in appetite suppression or weight loss, which she attributes to her eating habits, particularly eating late at night after work.    Her typical daily diet includes a bowl of cereal in the morning and a wrap at work, with her last meal often being late at night. She works at a restaurant five days a week and is physically active, easily achieving 10,000 steps a day. She weighs 154 pounds as of today.    No pain, tenderness, or warmth in the swollen leg. No varicose  veins or history of injuries on the right side.    Wt Readings from Last 6 Encounters:   05/05/25 154 lb (69.9 kg)   03/03/25 153 lb (69.4 kg)   02/25/25 155 lb (70.3 kg)   02/18/25 155 lb (70.3 kg)   10/17/24 142 lb (64.4 kg)   04/25/24 159 lb (72.1 kg)              Breakfast Lunch Dinner Snacks Fluids   Reviewed           REVIEW OF SYSTEMS  GENERAL HEALTH: feels well otherwise, denied any fevers chills or night sweats   RESPIRATORY: denies shortness of breath   CARDIOVASCULAR: denies chest pain  GI: denies abdominal pain    EXAM  LMP 02/07/2025 (Within Days)   GENERAL: well developed, well nourished,in no apparent distress, A/O x3  SKIN: no rashes,no suspicious lesions  HEENT: atraumatic, normocephalic, OP-clear, PERRL  NECK: supple,no adenopathy  LUNGS: clear to auscultation bilaterally   CARDIO: RRR without murmur  GI: good BS's,NT/ND, no masses or HSM  EXTREMITIES: no cyanosis, no clubbing, no edema    Lab Results   Component Value Date    WBC 8.7 04/13/2024    RBC 4.90 04/13/2024    HGB 15.0 04/13/2024    HCT 44.8 04/13/2024    MCV 91.4 04/13/2024    MCH 30.6 04/13/2024    MCHC 33.5 04/13/2024    RDW 13.2 04/13/2024    .0 04/13/2024    MPV 11.1 10/20/2012     Lab Results   Component Value Date    GLU 72 04/13/2024    BUN 14 04/13/2024    BUNCREA 10.8 12/01/2022    CREATSERUM 0.90 04/13/2024    ANIONGAP 5 04/13/2024    GFR 74 01/27/2018    GFRNAA 97 09/11/2021    GFRAA 112 09/11/2021    CA 8.9 04/13/2024    OSMOCALC 283 04/13/2024    ALKPHO 43 04/13/2024    AST 10 (L) 04/13/2024    ALT 26 04/13/2024    BILT 0.8 04/13/2024    TP 7.2 04/13/2024    ALB 3.7 04/13/2024    GLOBULIN 3.5 04/13/2024    AGRATIO 1.7 01/17/2015     04/13/2024    K 3.6 04/13/2024     04/13/2024    CO2 26.0 04/13/2024     Lab Results   Component Value Date    EAG 85 08/31/2020    A1C 4.6 08/31/2020     Lab Results   Component Value Date    CHOLEST 179 04/13/2024    TRIG 100 04/13/2024    HDL 67 (H) 04/13/2024    LDL 94  04/13/2024    VLDL 16 04/13/2024    TCHDLRATIO 2.74 01/27/2018    NONHDLC 112 04/13/2024     Lab Results   Component Value Date    T4F 1.2 12/07/2024    TSH 0.409 (L) 12/07/2024    TSHT4 0.04 (L) 10/21/2015     Lab Results   Component Value Date    B12 389 10/18/2023     Lab Results   Component Value Date    VITD 33.0 10/18/2023       Medications Ordered Prior to Encounter[1]    ASSESSMENT  Analyzed weight data:     [facility] Information        Have any comorbidities improved since the last visit?   Hypertension DM 2 Dyslipidemia Osteoarthritis Sleep Apnea                    There are no diagnoses linked to this encounter.    PLAN  Assessment & Plan  Obesity (BMI 30-39.9)  On Wegovy 1.7 mg with no weight loss, possibly due to recent discontinuation and late-night eating. Discussed protein's role in enhancing GLP-1 efficacy. Advised on injection site change if leg swelling persists.  - Prescribe Wegovy 1.7 mg for one month to [facility] pharmacy.  - Encourage increased morning protein intake: protein shakes, eggs, cottage cheese, or Greek yogurt.  - Advise avoiding late-night eating.  - Request [employer] message in two weeks to discuss potential dose increase to 2.4 mg.    Right leg swelling  Swelling in right leg without pain, tenderness, or warmth. Ultrasound scheduled to rule out thrombosis. Possible circulation issue from prolonged sitting with legs crossed.  - Proceed with ultrasound to evaluate for thrombosis or other causes.  - Hold Dupixent injections until further evaluation of leg swelling.    HLD-continue lifestyle changes    BRENDAN-continue present management    There are no Patient Instructions on file for this visit.    No follow-ups on file.    Patient verbalizes understanding.    Analilia Preston MD          [1]   Current Outpatient Medications on File Prior to Visit   Medication Sig Dispense Refill    predniSONE 10 MG Oral Tab Take 3 tabs(30 mg) with food once a day x 10  days 30 tablet 0    TOPIRAMATE 100 MG Oral  Tab TAKE 1 TABLET BY MOUTH TWICE A  tablet 0    Dupilumab (DUPIXENT) 300 MG/2ML Subcutaneous Solution Auto-injector INJECT 1 PEN (300 MG) UNDER THE SKIN (SUBCUTANEOUS INJECTION) EVERY 14 (FOURTEEN) DAYS 4 mL 5    Levonorgestrel-Ethinyl Estrad (AVIANE) 0.1-20 MG-MCG Oral Tab Take 1 tablet by mouth daily. 84 tablet 3    cetirizine (EQ ALLERGY RELIEF, CETIRIZINE,) 10 MG Oral Tab Take 1 tablet (10 mg total) by mouth daily. 90 tablet 1    albuterol 108 (90 Base) MCG/ACT Inhalation Aero Soln Inhale 2 puffs into the lungs every 4 (four) hours as needed for Wheezing. 3 each 0    Tretinoin 0.05 % External Cream APPLY TOPICALLY TO AFFECTED AREA AT BEDTIME AS NEEDED 45 g 0    BUPROPION  MG Oral Tablet 12 Hr TAKE 1 TABLET BY MOUTH TWICE A  tablet 0    levothyroxine 150 MCG Oral Tab Take 1 tablet (150 mcg total) by mouth before breakfast. 90 tablet 0    EPINEPHrine (EPIPEN 2-STEPHANIE) 0.3 MG/0.3ML Injection Solution Auto-injector Inject 0.3 mL (1 each total) as directed as needed. 1 each 0    DUPIXENT 300 MG/2ML Subcutaneous Solution Pen-injector INJECT 1 PEN UNDER THE SKIN (SUBCUTANEOUS INJECTION) EVERY 14 DAYS. 4 each 0    predniSONE 10 MG Oral Tab Take 40mg q day x 5 days,then 30mg x 3 days,then 20 mg x 3 days,then10 mg x 3 days with food 38 tablet 0    fluticasone-umeclidin-vilant (TRELEGY ELLIPTA) 200-62.5-25 MCG/ACT Inhalation Aerosol Powder, Breath Activated Inhale 1 puff into the lungs daily. 3 each 1    triamcinolone 0.1 % External Cream Apply topically 2 (two) times daily as needed. 60 g 3    albuterol sulfate (2.5 MG/3ML) 0.083% Inhalation Nebu Soln Take 3 mL (2.5 mg total) by nebulization every 4 (four) hours as needed for Wheezing. 1 Box 1     No current facility-administered medications on file prior to visit.

## 2025-05-07 ENCOUNTER — TELEPHONE (OUTPATIENT)
Dept: ORTHOPEDICS CLINIC | Facility: CLINIC | Age: 50
End: 2025-05-07

## 2025-05-07 NOTE — TELEPHONE ENCOUNTER
Patient scheduled with Dr Julien for right thumb pain. Please advise   Future Appointments   Date Time Provider Department Center   7/2/2025 10:30 AM Bg Julien MD [facility]   9/18/2025 12:00 PM Analilia Preston MD [facility]

## 2025-05-14 RX ORDER — FUROSEMIDE 20 MG/1
20 TABLET ORAL 2 TIMES DAILY PRN
Qty: 180 TABLET | Refills: 0 | Status: SHIPPED | OUTPATIENT
Start: 2025-05-14

## 2025-05-14 RX ORDER — POTASSIUM CHLORIDE 750 MG/1
10 TABLET, EXTENDED RELEASE ORAL DAILY PRN
Qty: 90 TABLET | Refills: 0 | Status: SHIPPED | OUTPATIENT
Start: 2025-05-14

## 2025-05-14 NOTE — TELEPHONE ENCOUNTER
Okay to start Lasix 20 mg twice daily as needed and Klor-Con 10 mill equivalents daily as needed when taking Lasix.  Typically she can take diuretic for 3 to 5 days until swelling improves and then stop the medication and use it periodically.

## 2025-05-15 ENCOUNTER — PATIENT MESSAGE (OUTPATIENT)
Dept: INTERNAL MEDICINE CLINIC | Facility: CLINIC | Age: 50
End: 2025-05-15

## 2025-06-02 ENCOUNTER — OFFICE VISIT (OUTPATIENT)
Dept: FAMILY MEDICINE CLINIC | Facility: CLINIC | Age: 50
End: 2025-06-02
Payer: COMMERCIAL

## 2025-06-02 VITALS
HEART RATE: 80 BPM | RESPIRATION RATE: 16 BRPM | WEIGHT: 151.81 LBS | TEMPERATURE: 99 F | DIASTOLIC BLOOD PRESSURE: 74 MMHG | HEIGHT: 64 IN | SYSTOLIC BLOOD PRESSURE: 118 MMHG | BODY MASS INDEX: 25.92 KG/M2

## 2025-06-02 DIAGNOSIS — R60.0 PERIPHERAL EDEMA: Primary | ICD-10-CM

## 2025-06-02 PROCEDURE — 3008F BODY MASS INDEX DOCD: CPT | Performed by: FAMILY MEDICINE

## 2025-06-02 PROCEDURE — 3074F SYST BP LT 130 MM HG: CPT | Performed by: FAMILY MEDICINE

## 2025-06-02 PROCEDURE — 99214 OFFICE O/P EST MOD 30 MIN: CPT | Performed by: FAMILY MEDICINE

## 2025-06-02 PROCEDURE — 3078F DIAST BP <80 MM HG: CPT | Performed by: FAMILY MEDICINE

## 2025-06-02 PROCEDURE — G2211 COMPLEX E/M VISIT ADD ON: HCPCS | Performed by: FAMILY MEDICINE

## 2025-06-02 NOTE — PROGRESS NOTES
[facility] Office Note  Chief Complaint:   Chief Complaint   Patient presents with    Follow - Up     Swelling on right leg- has stayed the same. Some swelling on arms and hands still.        HPI:   History of Present Illness  Ms. Jamila Vivar is a 49 year old female who presents with swelling in her right leg.    She has persistent swelling in her right leg, described as feeling 'bruised' in certain spots without visible bruising. The leg is sore but not painful. Compression therapy and diuretics have not provided relief.    Occasionally, she notices similar symptoms in her arm, though she finds it difficult to compare them directly. No recent imaging studies have been conducted to evaluate these symptoms.    She experiences shortness of breath when walking up stairs. She exercises regularly and sleeps with her head slightly elevated due to snoring, using multiple pillows throughout the night. No known history of heart failure, and it has been nearly nine years since her last cardiac evaluation.       Past Medical History[1]  Past Surgical History[2]  Social History:  Short Social Hx on File[3]  Family History:  Family History[4]  Allergies:  Allergies[5]  Current Meds:  Current Medications[6]   Counseling given: Not Answered       REVIEW OF SYSTEMS:   ROS:  CONSTITUTIONAL:  Denies any unusual weight gain/loss, fever, chills, weakness or fatigue.  CARDIOVASCULAR:  Denies chest pain, chest pressure or chest discomfort. No palpitations, + edema.  Denies any dyspnea on exertion or at rest  RESPIRATORY:  Denies shortness of breath, cough  GASTROINTESTINAL:  Denies any abdominal pain, nausea, vomiting  NEUROLOGICAL:  Denies headache, dizziness, syncope, numbness or tingling in the extremities.  MUSCULOSKELETAL:  Denies muscle, back pain, joint pain or stiffness.  ALLERGIES:  + asthma    EXAM:   /74   Pulse 80   Temp 98.5 °F (36.9 °C) (Temporal)   Resp 16   Ht 5' 4\" (1.626 m)   Wt 151  lb 12.8 oz (68.9 kg)   LMP 04/29/2025 (Approximate)   BMI 26.06 kg/m²  Estimated body mass index is 26.06 kg/m² as calculated from the following:    Height as of this encounter: 5' 4\" (1.626 m).    Weight as of this encounter: 151 lb 12.8 oz (68.9 kg).   Vital signs reviewed.Appears stated age, well groomed.  Physical Exam:  GEN:  Patient is alert and oriented x3, no apparent distress  HEAD:  Normocephalic, atraumatic  NECK:  Supple, no LAD  LUNGS: clear to auscultation bilaterally, no rales/rhonchi/wheezing  HEART:  Regular rate and rhythm, no murmurs, rubs or gallops  ABDOMEN:  Soft, nondistended, nontender, bowel sounds normal in all 4 quadrants, no hepatosplenomegaly  EXTREMITIES:  Strength intact with 5/5 bilaterally upper and lower extremities, + nonpitting edema RLE  NEURO:  CN 2 - 12 grossly intact     ASSESSMENT AND PLAN:   1. Peripheral edema  - CARD ECHO 2D DOPPLER (CPT=93306); Future  - Vascular Surgery - In Network      Assessment & Plan  Venous insufficiency  Chronic right leg swelling with bruising sensation. Compression therapy and diuretics ineffective. Differential includes venous insufficiency, lymphedema, outlet obstruction. Heart failure less likely.  - Refer to vascular specialist.  - Venous doppler US negative for DVT recently  - Order echocardiogram to rule out cardiac causes.    Lymphedema  Possible lymphedema contributing to unilateral leg swelling. Needs differentiation from venous insufficiency.    Asthma  Asthma may cause occasional exertional dyspnea. Mild dyspnea could also be due to deconditioning.    General Health Maintenance  Discussed exercise and physical activity. Mild exertional dyspnea may relate to asthma or deconditioning.    Follow-up  Plans based on diagnostic findings. Cardiologist referral if echocardiogram shows cardiac issues.  - Follow up with vascular specialist after ultrasound results.  - Refer to cardiologist if echocardiogram shows cardiac issues.          Meds  & Refills for this Visit:  Requested Prescriptions      No prescriptions requested or ordered in this encounter       Health Maintenance:  Health Maintenance Due   Topic Date Due    Asthma Control Test  06/04/2019    COVID-19 Vaccine (4 - 2024-25 season) 09/01/2024    Zoster Vaccines (1 of 2) Never done    Annual Physical  04/18/2025       Patient/Caregiver Education: Patient/Caregiver Education: There are no barriers to learning. Medical education done.   Outcome: Patient verbalizes understanding. Patient is notified to call with any questions, complications, allergies, or worsening or changing symptoms.  Patient is to call with any side effects or complications from the treatments as a result of today.     Problem List:  Problem List[7]    KIRA DURAN, DO    Please note that portions of this note may have been completed with a voice recognition program. Efforts were made to edit the dictations but occasionally words are mis-transcribed.         [1]   Past Medical History:   Allergic rhinitis due to pollen    Brain tumor (benign) (HCC)    Chronic rhinitis    Extrinsic asthma, unspecified    Hypothyroid    Hypothyroidism    Nasal polyp    Nasal polyps   [2]   Past Surgical History:  Procedure Laterality Date    D & c  2003    Cervical Stump    Other surgical history  5/2012    NASAL POLYP DR GIBBS    Other surgical history  09/21/2021    uterine ablasion    Other surgical history Right 10/27/2021    Lasik redo   [3]   Social History  Socioeconomic History    Marital status:    Tobacco Use    Smoking status: Never     Passive exposure: Never    Smokeless tobacco: Never   Vaping Use    Vaping status: Never Used   Substance and Sexual Activity    Alcohol use: Not Currently     Alcohol/week: 1.0 standard drink of alcohol     Types: 1 Glasses of wine per week     Comment: 1-2 drinks/wk    Drug use: No    Sexual activity: Yes     Partners: Male     Birth control/protection: OCP   Other Topics Concern     Caffeine Concern Yes     Comment: 1-2 cokes/day    Exercise Yes     Comment: 2xweek cardio    Seat Belt Yes   [4]   Family History  Problem Relation Age of Onset    Cancer Mother         Brain    Cancer Maternal Grandmother         stomach cancer?    Diabetes Maternal Grandmother     Diabetes Maternal Grandfather     Heart Disorder Maternal Grandfather     Cancer Maternal Grandfather         unknown cancer    Suicide History Father     No Known Problems Sister    [5]   Allergies  Allergen Reactions    Aspirin Tightness in Throat    Levaquin [Levofloxacin Hemihydrate] RASH    Nsaids     Penicillins      As a Child   [6]   Current Outpatient Medications   Medication Sig Dispense Refill    furosemide (LASIX) 20 MG Oral Tab Take 1 tablet (20 mg total) by mouth 2 (two) times daily as needed (swelling). 180 tablet 0    Potassium Chloride ER (KLOR-CON 10) 10 MEQ Oral Tab CR Take 1 tablet (10 mEq total) by mouth daily as needed (when taking furosemide). 90 tablet 0    WEGOVY 1.7 MG/0.75ML Subcutaneous Solution Auto-injector INJECT 1.7 MG INTO THE SKIN ONCE WEEKLY FOR 28 DAYS      semaglutide-weight management 1.7 MG/0.75ML Subcutaneous Solution Auto-injector Inject 0.75 mL (1.7 mg total) into the skin once a week for 28 days. 3 mL 0    predniSONE 10 MG Oral Tab Take 3 tabs(30 mg) with food once a day x 10  days 30 tablet 0    TOPIRAMATE 100 MG Oral Tab TAKE 1 TABLET BY MOUTH TWICE A  tablet 0    Dupilumab (DUPIXENT) 300 MG/2ML Subcutaneous Solution Auto-injector INJECT 1 PEN (300 MG) UNDER THE SKIN (SUBCUTANEOUS INJECTION) EVERY 14 (FOURTEEN) DAYS 4 mL 5    Levonorgestrel-Ethinyl Estrad (AVIANE) 0.1-20 MG-MCG Oral Tab Take 1 tablet by mouth daily. 84 tablet 3    cetirizine (EQ ALLERGY RELIEF, CETIRIZINE,) 10 MG Oral Tab Take 1 tablet (10 mg total) by mouth daily. 90 tablet 1    albuterol 108 (90 Base) MCG/ACT Inhalation Aero Soln Inhale 2 puffs into the lungs every 4 (four) hours as needed for Wheezing. 3 each 0     Tretinoin 0.05 % External Cream APPLY TOPICALLY TO AFFECTED AREA AT BEDTIME AS NEEDED 45 g 0    BUPROPION  MG Oral Tablet 12 Hr TAKE 1 TABLET BY MOUTH TWICE A  tablet 0    levothyroxine 150 MCG Oral Tab Take 1 tablet (150 mcg total) by mouth before breakfast. 90 tablet 0    EPINEPHrine (EPIPEN 2-STEPHANIE) 0.3 MG/0.3ML Injection Solution Auto-injector Inject 0.3 mL (1 each total) as directed as needed. 1 each 0    DUPIXENT 300 MG/2ML Subcutaneous Solution Pen-injector INJECT 1 PEN UNDER THE SKIN (SUBCUTANEOUS INJECTION) EVERY 14 DAYS. 4 each 0    predniSONE 10 MG Oral Tab Take 40mg q day x 5 days,then 30mg x 3 days,then 20 mg x 3 days,then10 mg x 3 days with food 38 tablet 0    fluticasone-umeclidin-vilant (TRELEGY ELLIPTA) 200-62.5-25 MCG/ACT Inhalation Aerosol Powder, Breath Activated Inhale 1 puff into the lungs daily. 3 each 1    triamcinolone 0.1 % External Cream Apply topically 2 (two) times daily as needed. 60 g 3    albuterol sulfate (2.5 MG/3ML) 0.083% Inhalation Nebu Soln Take 3 mL (2.5 mg total) by nebulization every 4 (four) hours as needed for Wheezing. 1 Box 1   [7]   Patient Active Problem List  Diagnosis    Nontoxic multinodular goiter    Nontoxic uninodular goiter    Allergic rhinitis, cause unspecified    Herpes simplex without mention of complication    Abnormal weight gain    Hypothyroidism    Unspecified asthma(493.90)    Sinusitis    Overweight    Acquired hypothyroidism    Overweight (BMI 25.0-29.9)    Encounter for therapeutic drug monitoring    Fatigue    Heterozygous MTHFR mutation [id number]    Low vitamin B12 level    Vitamin D deficiency    Extrinsic asthma (HCC)    ASCUS with positive high risk HPV cervical ( positive 18/45 genotype 04/03/2019 )    DUB (dysfunctional uterine bleeding)    History of endometrial ablation - 2021

## 2025-06-05 ENCOUNTER — OFFICE VISIT (OUTPATIENT)
Facility: CLINIC | Age: 50
End: 2025-06-05

## 2025-06-05 VITALS — WEIGHT: 150 LBS | SYSTOLIC BLOOD PRESSURE: 90 MMHG | BODY MASS INDEX: 26 KG/M2 | DIASTOLIC BLOOD PRESSURE: 62 MMHG

## 2025-06-05 DIAGNOSIS — R60.0 EDEMA OF RIGHT LOWER EXTREMITY: Primary | ICD-10-CM

## 2025-06-05 PROCEDURE — 3078F DIAST BP <80 MM HG: CPT

## 2025-06-05 PROCEDURE — 99203 OFFICE O/P NEW LOW 30 MIN: CPT

## 2025-06-05 PROCEDURE — 3074F SYST BP LT 130 MM HG: CPT

## 2025-06-05 NOTE — PROGRESS NOTES
VASCULAR SURGERY CONSULT NOTE      Jamila Vivar   :  1975  MR#  [id number]    REFERRING PHYSICIAN:  Ralph Duran  PRIMARY CARE PHYSICIAN:  RALPH DURAN DO    Chief Complaint   Patient presents with    Referral     Referral from Dr. Ralph Duran for Right Leg and Right arm pain and edema.  The patient reports her skin feels like its burning.  She describes pain as if she were bruised.         HPI:    The patient is a 49 year old female who has been referred to the clinic today for an evaluation of her right lower extremity edema, heaviness, tiredness, and pain after prolonged standing. The symptoms are more prevalent in her calf and distal leg. This is interfering with her activities of daily living and exercise. She has worn knee high compression stockings in the recent past with no improvement of her symptoms. A venous doppler ultrasound was ordered by her primary care physician 25 which revealed no evidence of deep venous thrombosis.     PAST MEDICAL HISTORY:   Past Medical History[1]    PAST SURGICAL HISTORY:   Past Surgical History[2]     MEDICATIONS:   Current Medications[3]    ALLERGIES:   Allergies[4]    SOCIAL HISTORY:   Short Social Hx on File[5]     FAMILY HISTORY:   Family History[6]    ROS:   A 12 point review of systems with pertinent positives and negatives listed in the HPI.    PHYSICAL EXAM:   BP 90/62 (BP Location: Right arm, Patient Position: Sitting)   Wt 150 lb (68 kg)   LMP 2025 (Approximate)   BMI 25.75 kg/m²   GENERAL: alert and orientated X 3, well developed, well nourished, in no apparent distress  HEENT: ears and throat are clear  NECK: supple, no lymphadenopathy, thyroid wnl  CAROTID: No bruits  RESPIRATORY: no rales, rhonchi, or wheezes B  CARDIO: RRR without murmur, no murmur, no gallop   ABDOMEN: soft, non-tender with no palpable aneurysm or masses  BACK: normal, no tenderness  SKIN: no rashes, warm and dry  NEURO/PSYCH: orientated x3, normal  mood and affect, no sensory or motor deficit  EXTREMITIES: full range of motion, no tenderness or edema in either leg.   VASCULAR  Pulse exam right: femoral 2+, DP  2+, PT  2+  Pulse exam left: femoral  2+, DP  2+, PT  2+      Vein Assessment:      Mild scattered right  LE reticular veins with no significant hemosiderin deposition.    IMPRESSION:   Right  lower extremity edema.     PLAN:     The patient was educated in the benign condition of venous disease.  I have given the patient a prescription for Grade II compression stockings (20-30 mmHg, thigh-highs). We reviewed the importance of wearing these daily and consistently. We also reviewed other types of conservative measures, such as periodic leg elevation, walking for exercise, analgesic use, attempts at weight loss, and the avoidance of prolonged standing.  I have sent the patient for a venous reflux ultrasound to rule out venous insufficiency. Should the ultrasound study reveal venous reflux with dilation and she does not have relief of symptoms with conservative therapy, then endovenous laser ablation may be a possible treatment option.  I explained to the patient that her insurance company may require a 6-12 week trial period of conservative therapy prior to authorizing endovenous ablation treatment.  The patient understood and agreed to proceed with this treatment plan, all of her questions were answered during this clinic visit.       Thank you for allowing to participate in the care of your patient.     DEONDRE Bobo  Division of Vascular Surgery.          [1]   Past Medical History:   Allergic rhinitis due to pollen    Brain tumor (benign) (HCC)    Chronic rhinitis    Extrinsic asthma, unspecified    Hypothyroid    Hypothyroidism    Nasal polyp    Nasal polyps   [2]   Past Surgical History:  Procedure Laterality Date    D & c  2003    Cervical Stump    Other surgical history  5/2012    NASAL POLYP DR GIBBS    Other surgical history   09/21/2021    uterine ablasion    Other surgical history Right 10/27/2021    Lasik redo   [3]   Current Outpatient Medications   Medication Sig Dispense Refill    furosemide (LASIX) 20 MG Oral Tab Take 1 tablet (20 mg total) by mouth 2 (two) times daily as needed (swelling). 180 tablet 0    Potassium Chloride ER (KLOR-CON 10) 10 MEQ Oral Tab CR Take 1 tablet (10 mEq total) by mouth daily as needed (when taking furosemide). 90 tablet 0    WEGOVY 1.7 MG/0.75ML Subcutaneous Solution Auto-injector INJECT 1.7 MG INTO THE SKIN ONCE WEEKLY FOR 28 DAYS      predniSONE 10 MG Oral Tab Take 3 tabs(30 mg) with food once a day x 10  days 30 tablet 0    TOPIRAMATE 100 MG Oral Tab TAKE 1 TABLET BY MOUTH TWICE A  tablet 0    Dupilumab (DUPIXENT) 300 MG/2ML Subcutaneous Solution Auto-injector INJECT 1 PEN (300 MG) UNDER THE SKIN (SUBCUTANEOUS INJECTION) EVERY 14 (FOURTEEN) DAYS 4 mL 5    Levonorgestrel-Ethinyl Estrad (AVIANE) 0.1-20 MG-MCG Oral Tab Take 1 tablet by mouth daily. 84 tablet 3    cetirizine (EQ ALLERGY RELIEF, CETIRIZINE,) 10 MG Oral Tab Take 1 tablet (10 mg total) by mouth daily. 90 tablet 1    albuterol 108 (90 Base) MCG/ACT Inhalation Aero Soln Inhale 2 puffs into the lungs every 4 (four) hours as needed for Wheezing. 3 each 0    Tretinoin 0.05 % External Cream APPLY TOPICALLY TO AFFECTED AREA AT BEDTIME AS NEEDED 45 g 0    BUPROPION  MG Oral Tablet 12 Hr TAKE 1 TABLET BY MOUTH TWICE A  tablet 0    levothyroxine 150 MCG Oral Tab Take 1 tablet (150 mcg total) by mouth before breakfast. 90 tablet 0    EPINEPHrine (EPIPEN 2-STEPHANIE) 0.3 MG/0.3ML Injection Solution Auto-injector Inject 0.3 mL (1 each total) as directed as needed. 1 each 0    DUPIXENT 300 MG/2ML Subcutaneous Solution Pen-injector INJECT 1 PEN UNDER THE SKIN (SUBCUTANEOUS INJECTION) EVERY 14 DAYS. 4 each 0    predniSONE 10 MG Oral Tab Take 40mg q day x 5 days,then 30mg x 3 days,then 20 mg x 3 days,then10 mg x 3 days with food 38 tablet 0     fluticasone-umeclidin-vilant (TRELEGY ELLIPTA) 200-62.5-25 MCG/ACT Inhalation Aerosol Powder, Breath Activated Inhale 1 puff into the lungs daily. 3 each 1    triamcinolone 0.1 % External Cream Apply topically 2 (two) times daily as needed. 60 g 3    albuterol sulfate (2.5 MG/3ML) 0.083% Inhalation Nebu Soln Take 3 mL (2.5 mg total) by nebulization every 4 (four) hours as needed for Wheezing. 1 Box 1   [4]   Allergies  Allergen Reactions    Aspirin Tightness in Throat    Levaquin [Levofloxacin Hemihydrate] RASH    Nsaids     Penicillins      As a Child   [5]   Social History  Socioeconomic History    Marital status:    Tobacco Use    Smoking status: Never     Passive exposure: Never    Smokeless tobacco: Never   Vaping Use    Vaping status: Never Used   Substance and Sexual Activity    Alcohol use: Not Currently     Alcohol/week: 1.0 standard drink of alcohol     Types: 1 Glasses of wine per week     Comment: 1-2 drinks/wk    Drug use: No    Sexual activity: Yes     Partners: Male     Birth control/protection: OCP   Other Topics Concern    Caffeine Concern Yes     Comment: 1-2 cokes/day    Exercise Yes     Comment: 2xweek cardio    Seat Belt Yes   [6]   Family History  Problem Relation Age of Onset    Cancer Mother         Brain    Cancer Maternal Grandmother         stomach cancer?    Diabetes Maternal Grandmother     Diabetes Maternal Grandfather     Heart Disorder Maternal Grandfather     Cancer Maternal Grandfather         unknown cancer    Suicide History Father     No Known Problems Sister

## 2025-06-09 ENCOUNTER — PATIENT MESSAGE (OUTPATIENT)
Dept: INTERNAL MEDICINE CLINIC | Facility: CLINIC | Age: 50
End: 2025-06-09

## 2025-06-09 NOTE — TELEPHONE ENCOUNTER
Requesting   Requested Prescriptions     Pending Prescriptions Disp Refills    semaglutide-weight management 2.4 MG/0.75ML Subcutaneous Solution Auto-injector [Pharmacy Med Name: Wegovy 1.7 MG/0.75ML Subcutaneous Solution Auto-injector] 3 mL 0     Sig: Inject 0.75 mL (2.4 mg total) into the skin once a week for 4 doses.      LOV: 02/25/258  RTC: 09/18/25  Filled: 05/05/25 #3 with 0 refills    Future Appointments   Date Time Provider Department Center   6/10/2025  7:00 AM [facility] CARD STRESS ECHO RM1 [facility] CARD [place]   7/2/2025 10:30 AM Bg Julien MD [facility] ORTHO LB [facility] [place]   7/9/2025  1:30 PM E[facility] VASCULAR SURGERY LAB [facility]   7/28/2025  4:30 PM Ralph Macias DO [facility] [id number]   9/18/2025 12:00 PM Analilia Preston MD [facility]I [facility]

## 2025-06-10 ENCOUNTER — HOSPITAL ENCOUNTER (OUTPATIENT)
Dept: CV DIAGNOSTICS | Age: 50
Discharge: HOME | End: 2025-06-10
Attending: FAMILY MEDICINE
Payer: COMMERCIAL

## 2025-06-10 DIAGNOSIS — R60.0 PERIPHERAL EDEMA: ICD-10-CM

## 2025-06-10 PROCEDURE — 93306 TTE W/DOPPLER COMPLETE: CPT | Performed by: FAMILY MEDICINE

## 2025-06-10 PROCEDURE — 76376 3D RENDER W/INTRP POSTPROCES: CPT | Performed by: FAMILY MEDICINE

## 2025-06-23 RX ORDER — BUPROPION HYDROCHLORIDE 150 MG/1
150 TABLET, EXTENDED RELEASE ORAL 2 TIMES DAILY
Qty: 120 TABLET | Refills: 0 | OUTPATIENT
Start: 2025-06-23

## 2025-07-08 RX ORDER — SEMAGLUTIDE 2.4 MG/.75ML
INJECTION, SOLUTION SUBCUTANEOUS
Qty: 4 ML | Refills: 0 | Status: SHIPPED | OUTPATIENT
Start: 2025-07-08

## 2025-07-08 NOTE — TELEPHONE ENCOUNTER
Requesting   Requested Prescriptions     Pending Prescriptions Disp Refills    WEGOVY 2.4 MG/0.75ML Subcutaneous Solution Auto-injector [Pharmacy Med Name: Wegovy 2.4 MG/0.75ML Subcutaneous Solution Auto-injector] 4 mL 0     Sig: INJECT 1 DOSE (2.4MG) SUBCUTANEOUSLY ONCE A WEEK FOR  4  DOSES      LOV: 02/25/25  RTC: 09/18/25  Filled: 06/10/25 #3 with 0 refills    Future Appointments   Date Time Provider Department Center   7/9/2025  1:30 PM EE VASCULAR SURGERY LAB [facility]   7/28/2025  4:30 PM Ralph Macias DO EMG [id number]   9/18/2025 12:00 PM Analilia Preston MD [facility] [address]

## 2025-07-09 ENCOUNTER — NURSE ONLY (OUTPATIENT)
Facility: CLINIC | Age: 50
End: 2025-07-09
Payer: COMMERCIAL

## 2025-07-09 DIAGNOSIS — R60.0 EDEMA OF RIGHT LOWER EXTREMITY: ICD-10-CM

## 2025-07-10 DIAGNOSIS — E03.9 HYPOTHYROIDISM, UNSPECIFIED TYPE: ICD-10-CM

## 2025-07-14 NOTE — TELEPHONE ENCOUNTER
Did not pass protocol    Last office visit 10/17    Last refill was:     levothyroxine 150 MCG Oral Tab 90 tablet 0 12/9/2024 --   Sig:   Take 1 tablet (150 mcg total) by mouth before breakfast.     Route:   Oral     Order #:   [id number]       Next appointment: due for physical    Please sign pended medication if appropriate

## 2025-07-16 RX ORDER — LEVOTHYROXINE SODIUM 150 UG/1
150 TABLET ORAL
Qty: 90 TABLET | Refills: 0 | Status: SHIPPED | OUTPATIENT
Start: 2025-07-16

## 2025-07-31 DIAGNOSIS — J33.9 NASAL POLYP: ICD-10-CM

## 2025-08-05 RX ORDER — SEMAGLUTIDE 2.4 MG/.75ML
INJECTION, SOLUTION SUBCUTANEOUS
Qty: 4 ML | Refills: 0 | Status: SHIPPED | OUTPATIENT
Start: 2025-08-05

## 2025-08-05 RX ORDER — DUPILUMAB 300 MG/2ML
300 INJECTION, SOLUTION SUBCUTANEOUS
Qty: 4 ML | Refills: 2 | Status: SHIPPED | OUTPATIENT
Start: 2025-08-05

## 2025-08-12 DIAGNOSIS — J33.9 NASAL POLYP: ICD-10-CM

## 2025-08-12 RX ORDER — DUPILUMAB 300 MG/2ML
1 INJECTION, SOLUTION SUBCUTANEOUS
Qty: 4 ML | Refills: 2 | Status: SHIPPED | OUTPATIENT
Start: 2025-08-12

## 2025-08-15 ENCOUNTER — TELEPHONE (OUTPATIENT)
Dept: ALLERGY | Facility: CLINIC | Age: 50
End: 2025-08-15

## 2025-08-25 ENCOUNTER — OFFICE VISIT (OUTPATIENT)
Facility: CLINIC | Age: 50
End: 2025-08-25

## 2025-08-25 VITALS
SYSTOLIC BLOOD PRESSURE: 104 MMHG | BODY MASS INDEX: 25.44 KG/M2 | HEIGHT: 64 IN | WEIGHT: 149 LBS | DIASTOLIC BLOOD PRESSURE: 62 MMHG

## 2025-08-25 DIAGNOSIS — R60.0 LEG EDEMA, RIGHT: Primary | ICD-10-CM

## (undated) DIAGNOSIS — E03.9 ACQUIRED HYPOTHYROIDISM: ICD-10-CM

## (undated) DIAGNOSIS — L20.9 ATOPIC DERMATITIS, UNSPECIFIED TYPE: ICD-10-CM

## (undated) DIAGNOSIS — D32.9 MENINGIOMA (HCC): Primary | ICD-10-CM

## (undated) DIAGNOSIS — J30.2 SEASONAL ALLERGIES: ICD-10-CM

## (undated) NOTE — MR AVS SNAPSHOT
[address]  [address] [facility] [address] [phone]               Thank you for choosing us for your health care visit with MAL Zhao.   We are glad to serve you and happy to provide you with this summary of ABRS may be diagnosed if you’ve had an upper respiratory infection like a cold and cough for longer than 10 to 14 days. Your health care provider will ask about your symptoms and your medical history.  The provider will check your vital signs, including you Allergies as of Apr 27, 2017     Aspirin Tightness in Throat    Levaquin [Levofloxacin Hemihydrate] Rash    Nsaids     Penicillins     As a Child                Today's Vital Signs     BP Pulse Temp Weight          110/78 mmHg 88 98.2 °F (36.8 °C) (Or topiramate 50 MG Tabs   TAKE 1 TABLET(50 MG) BY MOUTH TWICE DAILY   Commonly known as:  TOPAMAX           * Notice: This list has 2 medication(s) that are the same as other medications prescribed for you.  Read the directions carefully, and ask your docto

## (undated) NOTE — Clinical Note
ASTHMA ACTION PLAN for Matthew Hassan     : 1975     Date: 3/2/2017  Provider:  Sergo Vera DO  Phone for doctor or clinic: [facility], [facility], [address] [phone]    ACT

## (undated) NOTE — LETTER
ASTHMA ACTION PLAN for Ricardo Rios     : 1975     Date: 2020  Provider:  [address] [facility],   Phone for doctor or clinic: 170 [facility], [address] [place] [address] [address], [facility] [address]  [phone] a Patient Caretaker

## (undated) NOTE — MR AVS SNAPSHOT
[address] [phone]               Thank you for choosing us for your health care visit with Federico Shelton MD.  We are glad to serve you and happy to provide you with this summary of Inhale 1 puff into the lungs daily.    Commonly known as:  BREO ELLIPRADHA           Ipratropium Bromide 0.02 % Soln   USE 2.5 ML VIA NEBULIZER TWICE DAILY   Commonly known as:  ATROVENT           Levothyroxine Sodium 137 MCG Tabs   TAKE 1 TABLET BY MOUTH BEFO You can access your MyChart to more actively manage your health care and view more details from this visit by going to [email or web address].   If you've recently had a stay at the Hospital you can access your discharge instructions in [address] by gonzález

## (undated) NOTE — MR AVS SNAPSHOT
[facility] 37, [address] [facility]  [phone]               Thank you for choosing us for your health care visit with Radha Ron MD.  We are glad to serve you and happy to provide you with this summ This is the mildest form of periodontal disease. The gum becomes irritated and swollen (inflamed). The space between the gum and tooth gets deeper, forming a pocket. Gums may become red and may bleed. Or, there may be no symptoms.  Left untreated, it can pr Commonly known as:  VENTOLIN           BuPROPion HCl 100 MG Tabs   Take 2 tablets (200 mg total) by mouth 2 (two) times daily. Commonly known as:  WELLBUTRIN           cyanocobalamin 1000 MCG/ML Soln   Inject into the muscle every 30 (thirty) days.    Com These medications were sent to [place] [address] [address] [place] [address], [address] [place] [address], [phone], [address] [place] [address],[facility], [facility] [address]     Phone:  [phone]    - Doxycycline Monohydrate 100

## (undated) NOTE — LETTER
[address] [facility]. [facility] [address] [place] [address], [id number] [place] [address] [phone]            February 24, 2020      3000 [facility]      Dear Ms. Gonzalez Bras

## (undated) NOTE — ED AVS SNAPSHOT
Ms. Brittnee Del Rosario   MRN: [id number]    Department:  [facility] Emergency Department   Date of Visit:  4/27/2019           Disclosure     Insurance plans vary and the physician(s) referred by the ER may not be covered by your plan.  Please contact yo tell this physician (or your personal doctor if your instructions are to return to your personal doctor) about any new or lasting problems. The primary care or specialist physician will see patients referred from the [facility] Emergency Department.  Carrie Antunez

## (undated) NOTE — LETTER
04/10/18  Patient Informed Consent for Appetite Suppressants      IJanett (12/21/1975), authorize the use of off-label use of appetite suppressants as a prescribed medical treatment. Please read each statement and sign this agreement below.  If y ? I understand that appetite suppressant medication is recommended with caution, for individuals with a history of substance abuse. I affirm that I will notify my doctor of any current or former substance abuse.    ? I understand that adverse effects may oc prescribed.     ________________________________________________   ____________           Signature               Date    _________________________________________________   ____________      Witness Signature              Date      Brown Laity 12/21/1975

## (undated) NOTE — MR AVS SNAPSHOT
[facility] 37, [address] [address] [phone]               Thank you for choosing us for your health care visit with [address] [place] [address], .   We are glad to serve you and happy to provide you with this s Take 1 tablet (100 mg total) by mouth 2 (two) times daily. Commonly known as:  VIBRAMYCIN           ergocalciferol 96997 units Caps   Take 1 capsule by mouth once a week.    Commonly known as:  DRISDOL/VITAMIN D2           Fluticasone Furoate-Vilanterol 2 discharge instructions in [facility] by going to Visits < Admission Summaries. If you've been to the Emergency Department or your doctor's office, you can view your past visit information in [facility] by going to Visits < Visit Summaries. Bar[place] questions?

## (undated) NOTE — LETTER
10/04/21    Graham Silva, : 73, is currently a patient under my medical care. She has a history of chronic sinusitis and hypothyroidism both of which are stable and controlled with medication.   If you have any questions or concerns, please feel stevo

## (undated) NOTE — LETTER
12/11/17  RULES FOR USE OF ANTI-OBESITY CONTROL MEDICATIONS    NOTE: SIGNING THIS FORM DOES NOT GUARANTEE THAT YOUR PROVIDER(S) AT [facility] WILL FIND YOU TO BE AN APPROPRIATE CANDIDATE FOR ANTI-OBESITY MEDICATIONS, BUT ONLY THAT YOU [facility] to notify area pharmacies of the terms of this agreement. I will not share, sell, or trade my medication with anyone. I understand that doing so is illegal and will result in my discharge from the care from our clinic.

## (undated) NOTE — LETTER
ASTHMA ACTION PLAN for Avril Lee     : 1975     Date: 2019  Provider:  Felice Lopez DO  Phone for doctor or clinic: [address] [place] [address], [address] [address] [phone] Patient Caretaker

## (undated) NOTE — LETTER
ASTHMA ACTION PLAN for Ricardo Rios     : 1975     Date: 2018  Provider:  Jessica Poster, DO  Phone for doctor or clinic: [address] [facility], [address] [address]

## (undated) NOTE — MR AVS SNAPSHOT
[address] [facility] [address] [phone]               Thank you for choosing us for your health care visit with MAL Dominguez.   We are glad to serve you and happy to provide you with this summary of y bleeding, or are taking blood-thinning medicines, talk with your healthcare provider before using these medicines.) Aspirin should never be given to anyone under 25years of age who is ill with a viral infection or fever.  It may cause severe liver or brain [facility] ([facility] [address])    [address], [address] [facility] [address] [phone]              Allergies as of Jan 04, 2017     Aspirin Tightness in Throat    Levaquin [Levofloxacin Hemihydrate] Rash    Nsaids     Peni Commonly known as:  ADIPEX-P           * Phentermine HCl 15 MG Caps   Take 1 capsule (15 mg total) by mouth every morning. predniSONE 10 MG Tabs   Days 1 and 2- 2 tabs TID. Days 3 and 4- 2 tabs BID. Days 5 and 6- 2 tabs QD.    Commonly known as: Don’t eat while distracted and slow down. Avoid over sized portions. Don’t eat while when you’re bored.      EAT THESE FOODS MORE OFTEN: EAT THESE FOODS LESS OFTEN:   Make half your plate fruits and vegetables Highly refined, white starches including wh

## (undated) NOTE — LETTER
6/5/2025              Jamila Vivar        [address] [place] [address]         Dear Jamila,    ***    Sincerely,    CASEY Bobo          Document electronically generated by:  Suha FLEIPE CMA

## (undated) NOTE — LETTER
[address]  600 [facility]  [place],  [address]       02/03/21        Patient: Macho Swift   YOB: 1975   Date of Visit: 2/3/2021       Dear  Dr. Carter Self           Thank you for referring Macho Swift to my practic Document electronically generated by:  Israel Vasquez

## (undated) NOTE — LETTER
12/11/17  WEIGHT LOSS PROGRAM CONSENT FORM      I, ________________________________, authorize Dr. Stanton Srinivasan and associated health care providers, to help me in my weight-reduction efforts.  I understand that my program may consist of a balanced-deficit successful. I also understand that obesity is a chronic, lifelong condition that may require changes in eating habits and permanent changes in behavior to be treated successfully.     I have read and fully understand this consent form and it has been fully

## (undated) NOTE — LETTER
4/23/2024    Jamila Vivar  [address] [place] [address]    Dear Jamila,    We would like to inform you that your account has been charged $40 for not showing up to the office for your scheduled appointment on 04/23/2024.    Our no-show policy is as follows: A 24-hour notice is required, or you may be charged a $40 No Show fee.      If you are unable to keep your scheduled appointment, please notify us at least 24 hours in advance so we can accommodate our other patients. You may also reschedule your appointment at that time.    On the third no-show, within a 12-month period, it will be the physician’s discretion as to whether a discharge letter will be sent out disengaging you from the practice and giving you 30 days to enroll with a new non [facility] physician.    If you would like to contest this charge, please call [phone].    Sincerely,  [facility]

## (undated) NOTE — LETTER
Medical Grade Compression Hose     Patient: Jamila Vivar     YOB: 1975         Diagnosis Right Lower Extremity Edema   ICD-10  M79.604  Compression: 20-30mmHg- Moderate  Style: Thigh-High        Amount: X 2  HCPS: - Thigh High          Physician Signature: _____________________  Date: 6/5/2025     CASEY Bobo

## (undated) NOTE — LETTER
ASTHMA ACTION PLAN for Riley Celestin     : 1975     Date: 2020  Provider:  Jazzy Epperson DO  Phone for doctor or clinic: [address], [address] [facility] [address], [address]  [phone] [] Asthma Action Plan reviewed with patient (and caregiver if necessary) on the phone and mailed copy to patient or submitted via [address]      Signatures:  Provider  Erin De Santiago DO   Patient Caretaker